# Patient Record
Sex: FEMALE | Race: BLACK OR AFRICAN AMERICAN | NOT HISPANIC OR LATINO | Employment: UNEMPLOYED | URBAN - METROPOLITAN AREA
[De-identification: names, ages, dates, MRNs, and addresses within clinical notes are randomized per-mention and may not be internally consistent; named-entity substitution may affect disease eponyms.]

---

## 2021-12-28 ENCOUNTER — APPOINTMENT (EMERGENCY)
Dept: RADIOLOGY | Facility: HOSPITAL | Age: 61
DRG: 137 | End: 2021-12-28
Payer: COMMERCIAL

## 2021-12-28 ENCOUNTER — HOSPITAL ENCOUNTER (INPATIENT)
Facility: HOSPITAL | Age: 61
LOS: 3 days | Discharge: HOME | DRG: 137 | End: 2021-12-31
Attending: EMERGENCY MEDICINE | Admitting: INTERNAL MEDICINE
Payer: COMMERCIAL

## 2021-12-28 DIAGNOSIS — J18.9 SEPSIS DUE TO PNEUMONIA (HCC): Primary | ICD-10-CM

## 2021-12-28 DIAGNOSIS — A41.9 SEPSIS DUE TO PNEUMONIA (HCC): Primary | ICD-10-CM

## 2021-12-28 DIAGNOSIS — N17.9 AKI (ACUTE KIDNEY INJURY) (HCC): ICD-10-CM

## 2021-12-28 DIAGNOSIS — E87.6 HYPOKALEMIA: ICD-10-CM

## 2021-12-28 PROBLEM — I10 HTN (HYPERTENSION): Status: ACTIVE | Noted: 2021-12-28

## 2021-12-28 PROBLEM — E11.9 DIABETES (HCC): Status: ACTIVE | Noted: 2021-12-28

## 2021-12-28 PROBLEM — R65.10 SIRS (SYSTEMIC INFLAMMATORY RESPONSE SYNDROME) (HCC): Status: ACTIVE | Noted: 2021-12-28

## 2021-12-28 PROBLEM — U07.1 PNEUMONIA DUE TO COVID-19 VIRUS: Status: ACTIVE | Noted: 2021-12-28

## 2021-12-28 PROBLEM — J12.82 PNEUMONIA DUE TO COVID-19 VIRUS: Status: ACTIVE | Noted: 2021-12-28

## 2021-12-28 LAB
2HR DELTA HS TROPONIN: 1.8 NG/L
4HR DELTA HS TROPONIN: -2 NG/L
ALBUMIN SERPL BCP-MCNC: 2.6 G/DL (ref 3.5–5)
ALP SERPL-CCNC: 70 U/L (ref 46–116)
ALT SERPL W P-5'-P-CCNC: 26 U/L (ref 12–78)
ANION GAP SERPL CALCULATED.3IONS-SCNC: 12 MMOL/L (ref 4–13)
AST SERPL W P-5'-P-CCNC: 36 U/L (ref 5–45)
BASOPHILS # BLD MANUAL: 0 THOUSAND/UL (ref 0–0.1)
BASOPHILS NFR MAR MANUAL: 0 % (ref 0–1)
BILIRUB SERPL-MCNC: 0.29 MG/DL (ref 0.2–1)
BUN SERPL-MCNC: 22 MG/DL (ref 5–25)
CALCIUM ALBUM COR SERPL-MCNC: 9.6 MG/DL (ref 8.3–10.1)
CALCIUM SERPL-MCNC: 8.5 MG/DL (ref 8.3–10.1)
CARDIAC TROPONIN I PNL SERPL HS: 22 NG/L
CARDIAC TROPONIN I PNL SERPL HS: 24 NG/L
CARDIAC TROPONIN I PNL SERPL HS: 25.8 NG/L
CHLORIDE SERPL-SCNC: 103 MMOL/L (ref 100–108)
CK MB SERPL-MCNC: <1 % (ref 0–2.5)
CK MB SERPL-MCNC: <1 NG/ML (ref 0–5)
CK SERPL-CCNC: 443 U/L (ref 26–192)
CO2 SERPL-SCNC: 25 MMOL/L (ref 21–32)
CREAT SERPL-MCNC: 1.74 MG/DL (ref 0.6–1.3)
CRP SERPL QL: 82.9 MG/L
D DIMER PPP FEU-MCNC: 1.25 UG/ML FEU
EOSINOPHIL # BLD MANUAL: 0 THOUSAND/UL (ref 0–0.4)
EOSINOPHIL NFR BLD MANUAL: 0 % (ref 0–6)
ERYTHROCYTE [DISTWIDTH] IN BLOOD BY AUTOMATED COUNT: 13.6 % (ref 11.6–15.1)
FLUAV RNA RESP QL NAA+PROBE: NEGATIVE
FLUBV RNA RESP QL NAA+PROBE: NEGATIVE
GFR SERPL CREATININE-BSD FRML MDRD: 30 ML/MIN/1.73SQ M
GLUCOSE SERPL-MCNC: 69 MG/DL (ref 65–140)
GLUCOSE SERPL-MCNC: 82 MG/DL (ref 65–140)
HCT VFR BLD AUTO: 41.3 % (ref 34.8–46.1)
HGB BLD-MCNC: 13.3 G/DL (ref 11.5–15.4)
LACTATE SERPL-SCNC: 1.7 MMOL/L (ref 0.5–2)
LACTATE SERPL-SCNC: 2 MMOL/L (ref 0.5–2)
LACTATE SERPL-SCNC: 2.9 MMOL/L (ref 0.5–2)
LYMPHOCYTES # BLD AUTO: 0.5 THOUSAND/UL (ref 0.6–4.47)
LYMPHOCYTES # BLD AUTO: 8 % (ref 14–44)
MCH RBC QN AUTO: 27.2 PG (ref 26.8–34.3)
MCHC RBC AUTO-ENTMCNC: 32.2 G/DL (ref 31.4–37.4)
MCV RBC AUTO: 85 FL (ref 82–98)
METAMYELOCYTES NFR BLD MANUAL: 1 % (ref 0–1)
MONOCYTES # BLD AUTO: 0.57 THOUSAND/UL (ref 0–1.22)
MONOCYTES NFR BLD: 9 % (ref 4–12)
NEUTROPHILS # BLD MANUAL: 5.17 THOUSAND/UL (ref 1.85–7.62)
NEUTS BAND NFR BLD MANUAL: 9 % (ref 0–8)
NEUTS SEG NFR BLD AUTO: 73 % (ref 43–75)
PLATELET # BLD AUTO: 288 THOUSANDS/UL (ref 149–390)
PLATELET # BLD AUTO: 325 THOUSANDS/UL (ref 149–390)
PLATELET BLD QL SMEAR: ADEQUATE
PMV BLD AUTO: 9.1 FL (ref 8.9–12.7)
PMV BLD AUTO: 9.3 FL (ref 8.9–12.7)
POTASSIUM SERPL-SCNC: 3.3 MMOL/L (ref 3.5–5.3)
PROT SERPL-MCNC: 7.3 G/DL (ref 6.4–8.2)
RBC # BLD AUTO: 4.89 MILLION/UL (ref 3.81–5.12)
RBC MORPH BLD: NORMAL
RSV RNA RESP QL NAA+PROBE: NEGATIVE
SARS-COV-2 RNA RESP QL NAA+PROBE: POSITIVE
SODIUM SERPL-SCNC: 140 MMOL/L (ref 136–145)
WBC # BLD AUTO: 6.3 THOUSAND/UL (ref 4.31–10.16)

## 2021-12-28 PROCEDURE — 85049 AUTOMATED PLATELET COUNT: CPT

## 2021-12-28 PROCEDURE — 87040 BLOOD CULTURE FOR BACTERIA: CPT

## 2021-12-28 PROCEDURE — XW033E5 INTRODUCTION OF REMDESIVIR ANTI-INFECTIVE INTO PERIPHERAL VEIN, PERCUTANEOUS APPROACH, NEW TECHNOLOGY GROUP 5: ICD-10-PCS

## 2021-12-28 PROCEDURE — 82948 REAGENT STRIP/BLOOD GLUCOSE: CPT

## 2021-12-28 PROCEDURE — 71045 X-RAY EXAM CHEST 1 VIEW: CPT

## 2021-12-28 PROCEDURE — 93005 ELECTROCARDIOGRAM TRACING: CPT

## 2021-12-28 PROCEDURE — 82550 ASSAY OF CK (CPK): CPT

## 2021-12-28 PROCEDURE — 80053 COMPREHEN METABOLIC PANEL: CPT | Performed by: EMERGENCY MEDICINE

## 2021-12-28 PROCEDURE — 99285 EMERGENCY DEPT VISIT HI MDM: CPT

## 2021-12-28 PROCEDURE — 83605 ASSAY OF LACTIC ACID: CPT | Performed by: INTERNAL MEDICINE

## 2021-12-28 PROCEDURE — 96375 TX/PRO/DX INJ NEW DRUG ADDON: CPT

## 2021-12-28 PROCEDURE — 82553 CREATINE MB FRACTION: CPT

## 2021-12-28 PROCEDURE — 0241U HB NFCT DS VIR RESP RNA 4 TRGT: CPT

## 2021-12-28 PROCEDURE — 84484 ASSAY OF TROPONIN QUANT: CPT

## 2021-12-28 PROCEDURE — 84145 PROCALCITONIN (PCT): CPT

## 2021-12-28 PROCEDURE — 85007 BL SMEAR W/DIFF WBC COUNT: CPT | Performed by: EMERGENCY MEDICINE

## 2021-12-28 PROCEDURE — 85027 COMPLETE CBC AUTOMATED: CPT | Performed by: EMERGENCY MEDICINE

## 2021-12-28 PROCEDURE — 99285 EMERGENCY DEPT VISIT HI MDM: CPT | Performed by: EMERGENCY MEDICINE

## 2021-12-28 PROCEDURE — 84484 ASSAY OF TROPONIN QUANT: CPT | Performed by: EMERGENCY MEDICINE

## 2021-12-28 PROCEDURE — 96365 THER/PROPH/DIAG IV INF INIT: CPT

## 2021-12-28 PROCEDURE — 85379 FIBRIN DEGRADATION QUANT: CPT

## 2021-12-28 PROCEDURE — 83605 ASSAY OF LACTIC ACID: CPT

## 2021-12-28 PROCEDURE — 99223 1ST HOSP IP/OBS HIGH 75: CPT | Performed by: INTERNAL MEDICINE

## 2021-12-28 PROCEDURE — 86140 C-REACTIVE PROTEIN: CPT

## 2021-12-28 PROCEDURE — 36415 COLL VENOUS BLD VENIPUNCTURE: CPT | Performed by: EMERGENCY MEDICINE

## 2021-12-28 RX ORDER — GLIMEPIRIDE 2 MG/1
4 TABLET ORAL
Status: CANCELLED | OUTPATIENT
Start: 2021-12-29

## 2021-12-28 RX ORDER — ACETAMINOPHEN 325 MG/1
650 TABLET ORAL ONCE
Status: COMPLETED | OUTPATIENT
Start: 2021-12-28 | End: 2021-12-28

## 2021-12-28 RX ORDER — POTASSIUM CHLORIDE 20 MEQ/1
40 TABLET, EXTENDED RELEASE ORAL ONCE
Status: COMPLETED | OUTPATIENT
Start: 2021-12-28 | End: 2021-12-28

## 2021-12-28 RX ORDER — GLIMEPIRIDE 4 MG/1
4 TABLET ORAL
COMMUNITY

## 2021-12-28 RX ORDER — AMLODIPINE BESYLATE 10 MG/1
10 TABLET ORAL DAILY
COMMUNITY

## 2021-12-28 RX ORDER — DOXYCYCLINE HYCLATE 100 MG/1
100 CAPSULE ORAL EVERY 12 HOURS
Status: DISCONTINUED | OUTPATIENT
Start: 2021-12-29 | End: 2021-12-29

## 2021-12-28 RX ORDER — HEPARIN SODIUM 5000 [USP'U]/ML
5000 INJECTION, SOLUTION INTRAVENOUS; SUBCUTANEOUS EVERY 8 HOURS SCHEDULED
Status: DISCONTINUED | OUTPATIENT
Start: 2021-12-28 | End: 2021-12-31 | Stop reason: HOSPADM

## 2021-12-28 RX ORDER — DEXAMETHASONE SODIUM PHOSPHATE 4 MG/ML
6 INJECTION, SOLUTION INTRA-ARTICULAR; INTRALESIONAL; INTRAMUSCULAR; INTRAVENOUS; SOFT TISSUE EVERY 24 HOURS
Status: DISCONTINUED | OUTPATIENT
Start: 2021-12-28 | End: 2021-12-31

## 2021-12-28 RX ORDER — SODIUM CHLORIDE 9 MG/ML
75 INJECTION, SOLUTION INTRAVENOUS CONTINUOUS
Status: DISCONTINUED | OUTPATIENT
Start: 2021-12-28 | End: 2021-12-29

## 2021-12-28 RX ORDER — AMLODIPINE BESYLATE 10 MG/1
10 TABLET ORAL DAILY
Status: DISCONTINUED | OUTPATIENT
Start: 2021-12-29 | End: 2021-12-31 | Stop reason: HOSPADM

## 2021-12-28 RX ORDER — ACETAMINOPHEN 325 MG/1
650 TABLET ORAL EVERY 6 HOURS PRN
Status: DISCONTINUED | OUTPATIENT
Start: 2021-12-28 | End: 2021-12-31 | Stop reason: HOSPADM

## 2021-12-28 RX ORDER — POTASSIUM CHLORIDE 20 MEQ/1
20 TABLET, EXTENDED RELEASE ORAL ONCE
Status: COMPLETED | OUTPATIENT
Start: 2021-12-28 | End: 2021-12-28

## 2021-12-28 RX ADMIN — SODIUM CHLORIDE 1000 ML: 0.9 INJECTION, SOLUTION INTRAVENOUS at 17:04

## 2021-12-28 RX ADMIN — POTASSIUM CHLORIDE 20 MEQ: 1500 TABLET, EXTENDED RELEASE ORAL at 17:03

## 2021-12-28 RX ADMIN — HEPARIN SODIUM 5000 UNITS: 5000 INJECTION INTRAVENOUS; SUBCUTANEOUS at 23:33

## 2021-12-28 RX ADMIN — AZITHROMYCIN MONOHYDRATE 500 MG: 500 INJECTION, POWDER, LYOPHILIZED, FOR SOLUTION INTRAVENOUS at 17:02

## 2021-12-28 RX ADMIN — SODIUM CHLORIDE 75 ML/HR: 0.9 INJECTION, SOLUTION INTRAVENOUS at 22:25

## 2021-12-28 RX ADMIN — ACETAMINOPHEN 650 MG: 325 TABLET, FILM COATED ORAL at 15:19

## 2021-12-28 RX ADMIN — REMDESIVIR 200 MG: 100 INJECTION, POWDER, LYOPHILIZED, FOR SOLUTION INTRAVENOUS at 20:21

## 2021-12-28 RX ADMIN — POTASSIUM CHLORIDE 40 MEQ: 1500 TABLET, EXTENDED RELEASE ORAL at 20:16

## 2021-12-28 RX ADMIN — CEFTRIAXONE SODIUM 2000 MG: 10 INJECTION, POWDER, FOR SOLUTION INTRAVENOUS at 16:01

## 2021-12-28 RX ADMIN — DEXAMETHASONE SODIUM PHOSPHATE 6 MG: 4 INJECTION INTRA-ARTICULAR; INTRALESIONAL; INTRAMUSCULAR; INTRAVENOUS; SOFT TISSUE at 20:19

## 2021-12-28 NOTE — QUICK NOTE
[facility]ist Service Attending Physician Attestation Note - Admission    I have seen and examined Adan Puentes personally and have reviewed the medical record independently  I have reviewed the case with the resident physician including all assessments and the plan of care for each  I agree with the resident physician and offer the following addendum to the below statements by the resident physician:     Date Evaluated:  December 20, 2021  Time Evaluated:  6:30 p m  Subjective / HPI:  This is a 58-year-old female with a 4 day history of generalized weakness fevers chills and coughing  She is tachycardic and noted to have a temperature in the ED fulfilling SIRS criteria  Chest x-ray was consistent with multifocal pneumonia and her COVID has not come back positive  Thankfully she is not hypoxic but feels generally unwell  We will trend procalcitonin, check D-dimer, monitor oxygen saturations overnight  For now we will give antibiotics    Exam:   Heart sounds regular  Chest clear  Abdomen soft nondistended  Extremities no edema  No hepatojugular reflux  Appears dry     Assessment and Plan:    COVID-19 pneumonia with MARQUITA on SIRS criteria  IV fluids  Hold nephrotoxins  hold oral diabetic medication  Check bladder scan  Rocephin  Azithromycin  Monitor oxygen saturation  If hypoxic with an start remdesivir Decadron  Check procalcitonin tomorrow      Education and Discussions with Family / Patient:  Discussed with patient resident updated family -also updated daughter at bedside    Time Spent for Care: 45 minutes  More than 50% of total time spent on counseling and coordination of care as described above  Current Patient Status: Inpatient   Anticipated Length of Stay:  Patient will be admitted on an Inpatient basis with an anticipated length of stay of  more than 2 midnights     Justification for Hospital Stay:  COVID-19 pneumonia and sepsis    Epic / Care Everywhere Records Reviewed Independently: Yes     For detailed history, assessment, and plan of care, please review the statements below by Dr Domitila Reaves MD

## 2021-12-28 NOTE — ASSESSMENT & PLAN NOTE
57-year-old female, PMH DM T2, H TN, history of asthma presents to the ED with 4 days of generalized weakness and productive cough  Reports feeling weak with chills   5 fever, tachycardia, CXR with patchy infiltrates  LA 2 9  Tested positive for COVID-19  Initially on room air, however, patient started desaturating 88%, was started on 2L O2 on NC  Lab Results   Component Value Date    WBC 6 30 12/28/2021    DDIMER 1 25 (H) 12/28/2021    HSTNID2 1 8 12/28/2021    HSTNID4 -2 12/28/2021       Mild covid pathway was initiated:  Decadron 6 mg 10 days  Remdesivir 200 mg day1 then 100 mg q24 IV  Doxycyline 100 mg + Ceftriaxone 1g q24 , cannot rule out bacterial pneumonia, D/C if pro-christiana is negative  Labs : pro-christiana with am reflex, CRP 1-3 days, CBC , BMP am, Trend troponin, CK  Might need to add Baracitinib and Atorvastatin if switching to Moderate pathway per protocol

## 2021-12-28 NOTE — ASSESSMENT & PLAN NOTE
POA tachycardia, fever 102 5, no leukocytosis,   XR chest 1 view portable :  12/28/2021  Impression: Suspected bilateral pulmonary infiltrates, left greater than right, suspicious for multifocal pneumonia (possibly Covid-19)  Vitals  Blood Pressure: 151/73  Temperature: (!) 102 5 °F (39 2 °C)  Temp Source: Oral  Pulse: (!) 121  Respirations: 22  SpO2: 92 %    Likely in the setting of covid-19 pneumonia, treatment plan as above  Will start anti-biotics, D/C abx if pro-calcitonin is negative

## 2021-12-28 NOTE — ED PROVIDER NOTES
History  Chief Complaint   Patient presents with    Weakness - Generalized     Pt states that she has been feeling weak since having a "cold" a week ago  Patient is a 59-year-old female with a past medical history of type 2 diabetes, hypertension and asthma who presents to the emergency department with 4 days of generalized weakness and cough  She reports that her symptoms began 4 days ago with a cough that has been productive of yellow phlegm and she has felt very tired for the past week  She also reports that she has had chills but was unaware that she had fever until her temperature was assessed in triage and found to be 102 5 degrees Fahrenheit  She denies headache, lightheadedness, chest pain, shortness of breath, nausea, vomiting, abdominal pain, dysuria or rash  None       Past Medical History:   Diagnosis Date    Asthma     Diabetes mellitus ([facility] [address] )     Hypercholesteremia     Hypertension        No past surgical history on file  No family history on file  I have reviewed and agree with the history as documented  E-Cigarette/Vaping    E-Cigarette Use Never User      E-Cigarette/Vaping Substances     Social History     Tobacco Use    Smoking status: Never Smoker    Smokeless tobacco: Never Used   Vaping Use    Vaping Use: Never used   Substance Use Topics    Alcohol use: Never    Drug use: Never        Review of Systems   Constitutional: Positive for fatigue and fever  Negative for chills  HENT: Negative for congestion, ear pain, rhinorrhea and sore throat  Eyes: Negative for pain and visual disturbance  Respiratory: Positive for cough  Negative for shortness of breath  Cardiovascular: Negative for chest pain, palpitations and leg swelling  Gastrointestinal: Negative for abdominal distention, abdominal pain, diarrhea, nausea and vomiting  Endocrine: Negative  Genitourinary: Negative for dysuria, frequency, hematuria and urgency     Musculoskeletal: Negative for arthralgias, back pain, neck pain and neck stiffness  Skin: Negative for color change and rash  Allergic/Immunologic: Negative  Neurological: Negative for seizures and syncope  All other systems reviewed and are negative  Physical Exam  ED Triage Vitals [12/28/21 1509]   Temperature Pulse Respirations Blood Pressure SpO2   (!) 102 5 °F (39 2 °C) (!) 121 22 151/73 92 %      Temp Source Heart Rate Source Patient Position - Orthostatic VS BP Location FiO2 (%)   Oral Monitor Sitting Left arm --      Pain Score       --             Orthostatic Vital Signs  Vitals:    12/28/21 1509 12/28/21 1714   BP: 151/73    Pulse: (!) 121 104   Patient Position - Orthostatic VS: Sitting        Physical Exam  Vitals and nursing note reviewed  Constitutional:       General: She is not in acute distress  Appearance: Normal appearance  She is well-developed  She is ill-appearing  Comments: Patient is lying in her bed and appears very fatigued  SpO2 is 92 percent on room air  HENT:      Head: Normocephalic and atraumatic  Nose: Nose normal  No congestion or rhinorrhea  Mouth/Throat:      Mouth: Mucous membranes are moist       Pharynx: Oropharynx is clear  No oropharyngeal exudate or posterior oropharyngeal erythema  Eyes:      Extraocular Movements: Extraocular movements intact  Conjunctiva/sclera: Conjunctivae normal       Pupils: Pupils are equal, round, and reactive to light  Cardiovascular:      Rate and Rhythm: Regular rhythm  Tachycardia present  Pulses: Normal pulses  Heart sounds: Normal heart sounds  No murmur heard  No friction rub  Pulmonary:      Effort: Pulmonary effort is normal  No respiratory distress  Breath sounds: Rales present  Comments: Rales noted on auscultation of the right middle lobe  Abdominal:      General: Abdomen is flat  Bowel sounds are normal  There is no distension  Palpations: Abdomen is soft  There is no mass        Tenderness: There is no abdominal tenderness  There is no guarding or rebound  Musculoskeletal:         General: No swelling or tenderness  Normal range of motion  Cervical back: Normal range of motion and neck supple  No rigidity or tenderness  Right lower leg: No edema  Left lower leg: No edema  Skin:     General: Skin is warm and dry  Capillary Refill: Capillary refill takes less than 2 seconds  Coloration: Skin is not jaundiced  Findings: No erythema or rash  Neurological:      General: No focal deficit present  Mental Status: She is alert and oriented to person, place, and time  Mental status is at baseline  Cranial Nerves: No cranial nerve deficit  Sensory: No sensory deficit  Motor: No weakness  ED Medications  Medications   azithromycin (ZITHROMAX) 500 mg in sodium chloride 0 9% 250mL IVPB 500 mg (500 mg Intravenous New Bag 12/28/21 1702)   sodium chloride 0 9 % bolus 1,000 mL (1,000 mL Intravenous New Bag 12/28/21 1704)   acetaminophen (TYLENOL) tablet 650 mg (650 mg Oral Given 12/28/21 1519)   cefTRIAXone (ROCEPHIN) 2,000 mg in dextrose 5 % 50 mL IVPB (0 mg Intravenous Stopped 12/28/21 1659)   potassium chloride (K-DUR,KLOR-CON) CR tablet 20 mEq (20 mEq Oral Given 12/28/21 1703)       Diagnostic Studies  Results Reviewed     Procedure Component Value Units Date/Time    CBC and differential [[id number]]  (Normal) Collected: 12/28/21 1546    Lab Status: Final result Specimen: Blood from Arm, Right Updated: 12/28/21 1657     WBC 6 30 Thousand/uL      RBC 4 89 Million/uL      Hemoglobin 13 3 g/dL      Hematocrit 41 3 %      MCV 85 fL      MCH 27 2 pg      MCHC 32 2 g/dL      RDW 13 6 %      MPV 9 3 fL      Platelets 000 Thousands/uL     Narrative: This is an appended report  These results have been appended to a previously verified report      Manual Differential(PHLEBS Do Not Order) [[id number]]  (Abnormal) Collected: 12/28/21 1546    Lab Status: Final result Specimen: Blood from Arm, Right Updated: 12/28/21 1657     Segmented % 73 %      Bands % 9 %      Lymphocytes % 8 %      Monocytes % 9 %      Eosinophils, % 0 %      Basophils % 0 %      Metamyelocytes% 1 %      Absolute Neutrophils 5 17 Thousand/uL      Lymphocytes Absolute 0 50 Thousand/uL      Monocytes Absolute 0 57 Thousand/uL      Eosinophils Absolute 0 00 Thousand/uL      Basophils Absolute 0 00 Thousand/uL      Total Counted --     RBC Morphology Normal     Platelet Estimate Adequate    COVID/FLU/RSV - 2 hour TAT [[id number]] Collected: 12/28/21 1633    Lab Status: In process Specimen: Nares from Nose Updated: 12/28/21 1650    Comprehensive metabolic panel [[id number]]  (Abnormal) Collected: 12/28/21 1546    Lab Status: Final result Specimen: Blood from Arm, Right Updated: 12/28/21 1623     Sodium 140 mmol/L      Potassium 3 3 mmol/L      Chloride 103 mmol/L      CO2 25 mmol/L      ANION GAP 12 mmol/L      BUN 22 mg/dL      Creatinine 1 74 mg/dL      Glucose 82 mg/dL      Calcium 8 5 mg/dL      Corrected Calcium 9 6 mg/dL      AST 36 U/L      ALT 26 U/L      Alkaline Phosphatase 70 U/L      Total Protein 7 3 g/dL      Albumin 2 6 g/dL      Total Bilirubin 0 29 mg/dL      eGFR 30 ml/min/1 73sq m     Narrative:      [place] guidelines for Chronic Kidney Disease (CKD):     Stage 1 with normal or high GFR (GFR > 90 mL/min/1 73 square meters)    Stage 2 Mild CKD (GFR = 60-89 mL/min/1 73 square meters)    Stage 3A Moderate CKD (GFR = 45-59 mL/min/1 73 square meters)    Stage 3B Moderate CKD (GFR = 30-44 mL/min/1 73 square meters)    Stage 4 Severe CKD (GFR = 15-29 mL/min/1 73 square meters)    Stage 5 End Stage CKD (GFR <15 mL/min/1 73 square meters)  Note: GFR calculation is accurate only with a steady state creatinine    Blood culture #1 [[id number]] Collected: 12/28/21 1553    Lab Status:  In process Specimen: Blood from Arm, Left Updated: 12/28/21 1601    Blood culture #2 [[id number]] Collected: 12/28/21 1553    Lab Status: In process Specimen: Blood from Arm, Right Updated: 12/28/21 1600    Lactic acid [[id number]] Collected: 12/28/21 1553    Lab Status: In process Specimen: Blood from Arm, Left Updated: 12/28/21 1600    HS Troponin 0hr (reflex protocol) [[id number]] Collected: 12/28/21 1546    Lab Status: In process Specimen: Blood from Arm, Right Updated: 12/28/21 1554    Fingerstick Glucose (POCT) [[id number]]  (Normal) Collected: 12/28/21 1512    Lab Status: Final result Updated: 12/28/21 1514     POC Glucose 69 mg/dl                  XR chest 1 view portable   ED Interpretation by Nnamdi Eckert DO (12/28 1619)   Consolidation in the right lung field with possible consolidation versus cardiomegaly and a left lower lung field  Final Result by Arline King DO (12/28 1646)      Suspected bilateral pulmonary infiltrates, left greater than right, suspicious for multifocal pneumonia (possibly Covid-19)  Covid testing pending  Workstation performed: [id number]               Procedures  ECG 12 Lead Documentation Only    Date/Time: 12/28/2021 4:32 PM  Performed by: Nnamdi Eckert DO  Authorized by: Nnamdi Eckert DO     Indications / Diagnosis:  Weakness  ECG reviewed by me, the ED Provider: yes    Patient location:  ED  Previous ECG:     Previous ECG:  Unavailable    Comparison to cardiac monitor: No    Interpretation:     Interpretation: abnormal    Rate:     ECG rate:  109    ECG rate assessment: tachycardic    Rhythm:     Rhythm: sinus tachycardia    Ectopy:     Ectopy: none    QRS:     QRS axis:  Normal    QRS intervals:  Normal  Conduction:     Conduction: normal    ST segments:     ST segments:  Normal  T waves:     T waves: non-specific    Comments:      No acute ischemia noted            ED Course  ED Course as of 12/28/21 1714   Tue Dec 28, 2021   1642 Patient has MARQUITA and hypokalemic so I have ordered PO potassium and IV fluids at this time  1704 I will reach out to inpatient [facility] for admission for septic pneumonia  1710 Patient repeat temperature was 99 4 post Tylenol  26 I spoke with Dr Fito Gaston who agreed to admit the patient to inpatient [facility] for septic pneumonia, MARQUITA and Hypokalemia  MDM  Number of Diagnoses or Management Options  MARQUITA (acute kidney injury) ([facility] )  Hypokalemia  Sepsis due to pneumonia [facility])  Diagnosis management comments: And patient is a 27-year-old female with a past medical history of type 2 diabetes, hypertension and asthma who presents to the emergency department with 4 days of generalized weakness and cough  Her chest x-ray was conducted while I was in the room and it appears that she has consolidation the right middle lobe and left lower lobe  The combination of a fever 102 5 and a cough producing yellow phlegm causes concern for bacterial pneumonia  She meets SIRS criteria so I have ordered lactic, blood cultures and started broad-spectrum antibiotics with Rocephin and azithromycin  I have also ordered a CBC, CMP, EKG and tylenol to manage her fever  Results pending at this time  Disposition  Final diagnoses:   Sepsis due to pneumonia ([facility] )   MARQUITA (acute kidney injury) ([place] [address] )   Hypokalemia     Time reflects when diagnosis was documented in both MDM as applicable and the Disposition within this note     Time User Action Codes Description Comment    12/28/2021  5:09 PM Chloé Herring Add [J18 9,  A41 9] Sepsis due to pneumonia ([facility] )     12/28/2021  5:09 PM Roxane Troncoso Add [N17 9] MARQUITA (acute kidney injury) ([place] [address] )     12/28/2021  5:10 PM Chloé Herring Add [E87 6] Hypokalemia       ED Disposition     ED Disposition Condition Date/Time Comment    Admit Stable Tue Dec 28, 2021  5:12 PM Case was discussed with Dr Fito Gaston and the patient's admission status was agreed to be inpatient to the service of Dr Fito Gaston           Follow-up Information    None Patient's Medications    No medications on file     No discharge procedures on file  PDMP Review     None           ED Provider  Attending physically available and evaluated Dharmesh Monet I managed the patient along with the ED Attending      Electronically Signed by         Liya Arias DO  12/28/21 6931

## 2021-12-28 NOTE — ASSESSMENT & PLAN NOTE
Lab Results   Component Value Date    BUN 22 12/28/2021    CREATININE 1 74 (H) 12/28/2021       · Avoid nephrotoxic agent,   · Mild fluid hydration 75 cc an hour 12 hours  · A BMP, follow-up on results

## 2021-12-28 NOTE — ASSESSMENT & PLAN NOTE
No results found for: HGBA1C    Recent Labs     12/28/21  1512   POCGLU 69       Blood Sugar Average: Last 72 hrs:  (P) 69     On metformin and glimepiride  Starting on sliding scale, algorithm 2, no weight on file, adjust as appropriate  Her doctor in [place] is KAREN Kelsey

## 2021-12-28 NOTE — ED ATTENDING ATTESTATION
12/28/2021  IMarylu MD, saw and evaluated the patient  I have discussed the patient with the resident/non-physician practitioner and agree with the resident's/non-physician practitioner's findings, Plan of Care, and MDM as documented in the resident's/non-physician practitioner's note, except where noted  All available labs and Radiology studies were reviewed  I was present for key portions of any procedure(s) performed by the resident/non-physician practitioner and I was immediately available to provide assistance  At this point I agree with the current assessment done in the Emergency Department  I have conducted an independent evaluation of this patient a history and physical is as follows:    41-year-old presenting to ER with weakness fatigue  Fever  Tachycardic  Cough  Short of breath  Denies chest pain  No edema  Decreased breath sounds  Regular rate and rhythm  Abdomen soft nontender  Warm to touch  Agree with septic workup  COVID swab  Patient oxygen dropping to 91 percent  High risk patient    Will need admission to hospital       ED Course         Critical Care Time  Procedures

## 2021-12-29 ENCOUNTER — APPOINTMENT (INPATIENT)
Dept: NON INVASIVE DIAGNOSTICS | Facility: HOSPITAL | Age: 61
DRG: 137 | End: 2021-12-29
Payer: COMMERCIAL

## 2021-12-29 LAB
ALBUMIN SERPL BCP-MCNC: 2.4 G/DL (ref 3.5–5)
ALP SERPL-CCNC: 68 U/L (ref 46–116)
ALT SERPL W P-5'-P-CCNC: 26 U/L (ref 12–78)
ANION GAP SERPL CALCULATED.3IONS-SCNC: 11 MMOL/L (ref 4–13)
APICAL FOUR CHAMBER EJECTION FRACTION: 60 %
ASCENDING AORTA: 3.3 CM
AST SERPL W P-5'-P-CCNC: 33 U/L (ref 5–45)
BILIRUB SERPL-MCNC: 0.23 MG/DL (ref 0.2–1)
BUN SERPL-MCNC: 20 MG/DL (ref 5–25)
CALCIUM ALBUM COR SERPL-MCNC: 9.7 MG/DL (ref 8.3–10.1)
CALCIUM SERPL-MCNC: 8.4 MG/DL (ref 8.3–10.1)
CHLORIDE SERPL-SCNC: 106 MMOL/L (ref 100–108)
CO2 SERPL-SCNC: 23 MMOL/L (ref 21–32)
CREAT SERPL-MCNC: 1.4 MG/DL (ref 0.6–1.3)
CRP SERPL QL: 64.8 MG/L
E WAVE DECELERATION TIME: 249 MS
GFR SERPL CREATININE-BSD FRML MDRD: 40 ML/MIN/1.73SQ M
GLUCOSE SERPL-MCNC: 170 MG/DL (ref 65–140)
GLUCOSE SERPL-MCNC: 229 MG/DL (ref 65–140)
GLUCOSE SERPL-MCNC: 272 MG/DL (ref 65–140)
GLUCOSE SERPL-MCNC: 354 MG/DL (ref 65–140)
LACTATE SERPL-SCNC: 1.2 MMOL/L (ref 0.5–2)
LEFT ATRIUM AREA SYSTOLE SINGLE PLANE A4C: 24.7 CM2
MV PEAK A VEL: 1.3 M/S
MV PEAK E VEL: 114 CM/S
MV STENOSIS PRESSURE HALF TIME: 0 MS
POTASSIUM SERPL-SCNC: 3.8 MMOL/L (ref 3.5–5.3)
PROCALCITONIN SERPL-MCNC: 0.09 NG/ML
PROCALCITONIN SERPL-MCNC: 0.11 NG/ML
PROT SERPL-MCNC: 7.1 G/DL (ref 6.4–8.2)
RIGHT ATRIUM AREA SYSTOLE A4C: 15 CM2
RIGHT VENTRICLE ID DIMENSION: 3.8 CM
RV PSP: 25 MMHG
SL CV LV EF: 60
SODIUM SERPL-SCNC: 140 MMOL/L (ref 136–145)
TRICUSPID VALVE PEAK REGURGITATION VELOCITY: 2.47 M/S
TRICUSPID VALVE S': 1.1 CM/S
TV PEAK GRADIENT: 24 MMHG

## 2021-12-29 PROCEDURE — 93308 TTE F-UP OR LMTD: CPT | Performed by: INTERNAL MEDICINE

## 2021-12-29 PROCEDURE — 82948 REAGENT STRIP/BLOOD GLUCOSE: CPT

## 2021-12-29 PROCEDURE — 84145 PROCALCITONIN (PCT): CPT

## 2021-12-29 PROCEDURE — 93321 DOPPLER ECHO F-UP/LMTD STD: CPT

## 2021-12-29 PROCEDURE — 99232 SBSQ HOSP IP/OBS MODERATE 35: CPT | Performed by: INTERNAL MEDICINE

## 2021-12-29 PROCEDURE — 80053 COMPREHEN METABOLIC PANEL: CPT

## 2021-12-29 PROCEDURE — 93321 DOPPLER ECHO F-UP/LMTD STD: CPT | Performed by: INTERNAL MEDICINE

## 2021-12-29 PROCEDURE — 36415 COLL VENOUS BLD VENIPUNCTURE: CPT | Performed by: INTERNAL MEDICINE

## 2021-12-29 PROCEDURE — 93325 DOPPLER ECHO COLOR FLOW MAPG: CPT

## 2021-12-29 PROCEDURE — 93325 DOPPLER ECHO COLOR FLOW MAPG: CPT | Performed by: INTERNAL MEDICINE

## 2021-12-29 PROCEDURE — 93308 TTE F-UP OR LMTD: CPT

## 2021-12-29 PROCEDURE — 83605 ASSAY OF LACTIC ACID: CPT | Performed by: INTERNAL MEDICINE

## 2021-12-29 PROCEDURE — XW0DXM6 INTRODUCTION OF BARICITINIB INTO MOUTH AND PHARYNX, EXTERNAL APPROACH, NEW TECHNOLOGY GROUP 6: ICD-10-PCS | Performed by: INTERNAL MEDICINE

## 2021-12-29 PROCEDURE — 86140 C-REACTIVE PROTEIN: CPT

## 2021-12-29 RX ADMIN — HEPARIN SODIUM 5000 UNITS: 5000 INJECTION INTRAVENOUS; SUBCUTANEOUS at 05:13

## 2021-12-29 RX ADMIN — INSULIN LISPRO 3 UNITS: 100 INJECTION, SOLUTION INTRAVENOUS; SUBCUTANEOUS at 11:22

## 2021-12-29 RX ADMIN — HEPARIN SODIUM 5000 UNITS: 5000 INJECTION INTRAVENOUS; SUBCUTANEOUS at 20:43

## 2021-12-29 RX ADMIN — INSULIN LISPRO 2 UNITS: 100 INJECTION, SOLUTION INTRAVENOUS; SUBCUTANEOUS at 08:12

## 2021-12-29 RX ADMIN — BARICITINIB 2 MG: 2 TABLET, FILM COATED ORAL at 15:48

## 2021-12-29 RX ADMIN — AMLODIPINE BESYLATE 10 MG: 10 TABLET ORAL at 08:02

## 2021-12-29 RX ADMIN — INSULIN LISPRO 4 UNITS: 100 INJECTION, SOLUTION INTRAVENOUS; SUBCUTANEOUS at 17:24

## 2021-12-29 RX ADMIN — DOXYCYCLINE 100 MG: 100 CAPSULE ORAL at 07:59

## 2021-12-29 RX ADMIN — HEPARIN SODIUM 5000 UNITS: 5000 INJECTION INTRAVENOUS; SUBCUTANEOUS at 14:34

## 2021-12-29 RX ADMIN — DEXAMETHASONE SODIUM PHOSPHATE 6 MG: 4 INJECTION INTRA-ARTICULAR; INTRALESIONAL; INTRAMUSCULAR; INTRAVENOUS; SOFT TISSUE at 19:08

## 2021-12-29 RX ADMIN — REMDESIVIR 100 MG: 100 INJECTION, POWDER, LYOPHILIZED, FOR SOLUTION INTRAVENOUS at 20:43

## 2021-12-29 NOTE — ASSESSMENT & PLAN NOTE
Lab Results   Component Value Date    K 3 3 (L) 12/28/2021       · Replete potassium >4 0  · Follow-up on am BMP  · Received 20 mEq in ED,   · Ordered 40mEQ PO

## 2021-12-29 NOTE — ASSESSMENT & PLAN NOTE
68-year-old female, PMH DM T2, H TN, history of asthma presents to the ED with 4 days of generalized weakness and productive cough  Reports feeling weak with chills   5 fever, tachycardia, CXR with patchy infiltrates  LA 2 9  Tested positive for COVID-19  Initially on room air, however, patient started desaturating 88%, was started on 2L O2 on NC--> 4L NC  Lab Results   Component Value Date    WBC 6 30 12/28/2021    DDIMER 1 25 (H) 12/28/2021    CKTOTAL 443 (H) 12/28/2021    HSTNID2 1 8 12/28/2021    HSTNID4 -2 12/28/2021       Mild covid pathway was initiated:  Decadron 6 mg 10 days  Remdesivir 200 mg day1 then 100 mg q24 IV  Doxycyline 100 mg + Ceftriaxone 1g q24 , cannot rule out bacterial pneumonia, D/C if pro-christiana is negative  Labs : pro-christiana with am reflex- reflex pending, initial is 0 11, CRP 1-3 days, CBC , BMP am, Trend troponin, CK  Might need to add Baracitinib and Atorvastatin if switching to Moderate pathway per protocol  Will also obtain c  Dif due to patient endorsing loose-watery stools, which are likely due to COVID, but will check c  Dif just in case

## 2021-12-29 NOTE — PROGRESS NOTES
[facility]  Progress Note - Ben Carlson 1960, 57 y o  female MRN: [id number]  Unit/Bed#: S -01 Encounter: [id number]  Primary Care Provider: Luciano Shaffer   Date and time admitted to hospital: 12/28/2021  3:16 PM    * Pneumonia due to COVID-19 virus  Assessment & Plan  43-year-old female, PMH DM T2, H TN, history of asthma presents to the ED with 4 days of generalized weakness and productive cough  Reports feeling weak with chills   5 fever, tachycardia, CXR with patchy infiltrates  LA 2 9  Tested positive for COVID-19  Initially on room air, however, patient started desaturating 88%, was started on 2L O2 on NC--> 4L NC  Lab Results   Component Value Date    WBC 6 30 12/28/2021    DDIMER 1 25 (H) 12/28/2021    CKTOTAL 443 (H) 12/28/2021    HSTNID2 1 8 12/28/2021    HSTNID4 -2 12/28/2021       Mild covid pathway was initiated:  Decadron 6 mg 10 days  Remdesivir 200 mg day1 then 100 mg q24 IV  Doxycyline 100 mg + Ceftriaxone 1g q24 , cannot rule out bacterial pneumonia, D/C if pro-christiana is negative  Labs : pro-christiana with am reflex- reflex pending, initial is 0 11, CRP 1-3 days, CBC , BMP am, Trend troponin, CK  Might need to add Baracitinib and Atorvastatin if switching to Moderate pathway per protocol  Will also obtain c  Dif due to patient endorsing loose-watery stools, which are likely due to COVID, but will check c  Dif just in case  Hypokalemia  Assessment & Plan  Lab Results   Component Value Date    K 3 8 12/29/2021     · Replete potassium >4 0  · Follow-up on am BMP    Diabetes [facility])  Assessment & Plan  No results found for: HGBA1C    Recent Labs     12/28/21  1512 12/29/21  0805 12/29/21  1104   POCGLU 69 229* 272*       Blood Sugar Average: Last 72 hrs:  (P) 190     On metformin and glimepiride  Starting on sliding scale, algorithm 2, no weight on file, adjust as appropriate  Her doctor in [place] is [employer]       HTN (hypertension)  Assessment & Plan    BP Readings from Last 3 Encounters:   12/29/21 154/82     Continue Amlodipine 10 mg qd    MARQUITA (acute kidney injury) [facility])  Assessment & Plan  Lab Results   Component Value Date    BUN 20 12/29/2021    BUN 22 12/28/2021    CREATININE 1 40 (H) 12/29/2021    CREATININE 1 74 (H) 12/28/2021     · Avoid nephrotoxic agent,   · Mild fluid hydration 75 cc an hour 12 hours  · AM BMP, follow-up on results  · Baseline creatinine is unknown due to no data    SIRS (systemic inflammatory response syndrome) (HCC)  Assessment & Plan  POA tachycardia, fever 102 5, no leukocytosis  XR chest 1 view portable :  12/28/2021  Impression: Suspected bilateral pulmonary infiltrates, left greater than right, suspicious for multifocal pneumonia (possibly Covid-19)  Vitals  Blood Pressure: 151/73  Temperature: (!) 102 5 °F (39 2 °C)  Temp Source: Oral  Pulse: (!) 121  Respirations: 22  SpO2: 92 %  Height: 5' 2" (157 5 cm)  Weight - Scale: 89 kg (196 lb 3 4 oz)    Likely in the setting of covid-19 pneumonia, treatment plan as above  Will start antibiotics, D/C abx if pro-calcitonin is negative-- negative x1, will await second level      VTE Pharmacologic Prophylaxis: VTE Score: 5 High Risk (Score >/= 5) - Pharmacological DVT Prophylaxis Ordered: heparin  Sequential Compression Devices Ordered  Patient Centered Rounds: I evaluated the patient without nursing staff present due to [address] [facility] with Specialists or Other Care Team Provider: JIMENA, attending    Education and Discussions with Family / Patient: Attempted to update  (daughter) via phone  Unable to contact  Number on file does not work  Time Spent for Care: 30 minutes  More than 50% of total time spent on counseling and coordination of care as described above      Current Length of Stay: 1 day(s)  Current Patient Status: Inpatient   Certification Statement: The patient will continue to require additional inpatient hospital stay due to [place] pneumonia  Discharge Plan: Anticipate discharge in 48-72 hrs to home  Code Status: Level 1 - Full Code    Subjective:   Patient feels somewhat better than yesterday this morning on my assessment, she was on 3L via NC  She states she has been having loose stools  She still has decreased appetite  Objective:   Vitals:   Temp (24hrs), Av 1 °F (37 8 °C), Min:99 °F (37 2 °C), Max:102 5 °F (39 2 °C)    Temp:  [99 °F (37 2 °C)-102 5 °F (39 2 °C)] 99 °F (37 2 °C)  HR:  [] 105  Resp:  [18-22] 18  BP: (133-180)/(65-84) 154/82  SpO2:  [89 %-97 %] 91 %  Body mass index is 34 47 kg/m²  Input and Output Summary (last 24 hours): Intake/Output Summary (Last 24 hours) at 2021 1317  Last data filed at 2021  Gross per 24 hour   Intake 290 ml   Output --   Net 290 ml       Physical Exam:   Physical Exam  Vitals and nursing note reviewed  Constitutional:       General: She is not in acute distress  Appearance: She is well-developed  Interventions: Nasal cannula in place  HENT:      Head: Normocephalic and atraumatic  Mouth/Throat:      Mouth: Mucous membranes are moist    Eyes:      Extraocular Movements: Extraocular movements intact  Conjunctiva/sclera: Conjunctivae normal       Pupils: Pupils are equal, round, and reactive to light  Cardiovascular:      Rate and Rhythm: Normal rate and regular rhythm  Pulses: Normal pulses  Heart sounds: Normal heart sounds  No murmur heard  Pulmonary:      Effort: Pulmonary effort is normal  No respiratory distress  Breath sounds: Normal breath sounds  Abdominal:      General: Abdomen is flat  Bowel sounds are normal  There is no distension  Palpations: Abdomen is soft  Tenderness: There is no abdominal tenderness  Musculoskeletal:         General: No swelling or tenderness  Cervical back: Neck supple  Skin:     General: Skin is warm and dry     Neurological: Mental Status: She is alert and oriented to person, place, and time  Psychiatric:         Mood and Affect: Mood normal           Additional Data:     Labs:  Results from last 7 days   Lab Units 12/28/21  2222 12/28/21  1546 12/28/21  1546   WBC Thousand/uL  --   --  6 30   HEMOGLOBIN g/dL  --   --  13 3   HEMATOCRIT %  --   --  41 3   PLATELETS Thousands/uL 288   < > 325   BANDS PCT %  --   --  9*   LYMPHO PCT %  --   --  8*   MONO PCT %  --   --  9   EOS PCT %  --   --  0    < > = values in this interval not displayed  Results from last 7 days   Lab Units 12/29/21  0543   SODIUM mmol/L 140   POTASSIUM mmol/L 3 8   CHLORIDE mmol/L 106   CO2 mmol/L 23   BUN mg/dL 20   CREATININE mg/dL 1 40*   ANION GAP mmol/L 11   CALCIUM mg/dL 8 4   ALBUMIN g/dL 2 4*   TOTAL BILIRUBIN mg/dL 0 23   ALK PHOS U/L 68   ALT U/L 26   AST U/L 33   GLUCOSE RANDOM mg/dL 170*         Results from last 7 days   Lab Units 12/29/21  1104 12/29/21  0805 12/28/21  1512   POC GLUCOSE mg/dl 272* 229* 69         Results from last 7 days   Lab Units 12/29/21  0030 12/28/21  2222 12/28/21  1918 12/28/21  1553   LACTIC ACID mmol/L 1 2 2 0 1 7 2 9*   PROCALCITONIN ng/ml  --   --  0 11  --        Lines/Drains:  Invasive Devices  Report    Peripheral Intravenous Line            Peripheral IV 12/28/21 Right Antecubital <1 day                Imaging: Reviewed radiology reports from this admission including: chest xray    Recent Cultures (last 7 days):   Results from last 7 days   Lab Units 12/28/21  1553   BLOOD CULTURE  Received in Microbiology Lab  Culture in Progress  Received in Microbiology Lab  Culture in Progress         Last 24 Hours Medication List:   Current Facility-Administered Medications   Medication Dose Route Frequency Provider Last Rate    acetaminophen  650 mg Oral Q6H PRN Dawood Patrick MD      amLODIPine  10 mg Oral Daily Dawood Patrick MD      cefTRIAXone  2,000 mg Intravenous Q24H Katelynn Greene DO      dexamethasone  6 mg Intravenous Q24H Ruddy Arellano MD      doxycycline hyclate  100 mg Oral Q12H Ruddy Arellano MD      heparin (porcine)  5,000 Units Subcutaneous [facility] Ruddy Arellano MD      insulin lispro  1-5 Units Subcutaneous TID AC Ruddy Arellano MD      remdesivir  100 mg Intravenous Q24H Ruddy Arellano MD      sodium chloride  75 mL/hr Intravenous Continuous Ruddy Arellano MD 75 mL/hr (12/28/21 2225)        Today, Patient Was Seen By: Sharon Ascencio DO    **Please Note: This note may have been constructed using a voice recognition system  **

## 2021-12-29 NOTE — ASSESSMENT & PLAN NOTE
Lab Results   Component Value Date    BUN 20 12/29/2021    BUN 22 12/28/2021    CREATININE 1 40 (H) 12/29/2021    CREATININE 1 74 (H) 12/28/2021     · Avoid nephrotoxic agent,   · Mild fluid hydration 75 cc an hour 12 hours  · AM BMP, follow-up on results     · Baseline creatinine is unknown due to no data

## 2021-12-29 NOTE — UTILIZATION REVIEW
Initial Clinical Review    Admission: Date/Time/Statement:   Admission Orders (From admission, onward)     Ordered        12/28/21 1712  Inpatient Admission  Once                      Orders Placed This Encounter   Procedures    Inpatient Admission     Standing Status:   Standing     Number of Occurrences:   1     Order Specific Question:   Level of Care     Answer:   Med Surg [16]     Order Specific Question:   Estimated length of stay     Answer:   More than 2 Midnights     Order Specific Question:   Certification     Answer:   I certify that inpatient services are medically necessary for this patient for a duration of greater than two midnights  See H&P and MD Progress Notes for additional information about the patient's course of treatment  ED Arrival Information     Expected Arrival Acuity    - 12/28/2021 14:24 Emergent         Means of arrival Escorted by Service Admission type    Wheelchair Family Member Hospitalist Emergency         Arrival complaint    [facility]        Chief Complaint   Patient presents with    Weakness - Generalized     Pt states that she has been feeling weak since having a "cold" a week ago  Initial Presentation: 57 y o  female with a PMH of diabetes type 2, HTN and hx of asthma, not on any inhalerswho presents to ED from home with generalized weakness, fatigue, chills, productive cough for yellow phlegm  Reports that her symptoms began worsening 4 days ago, she has been sick for 1 or 2 weeks  Family states she hasn't been urinating much  On exam, pt febrile 102 5 F, tachycardic, rales present  Pt desat in ED to 88%, started on O2 @4 L with sat now >95 %  CXR shows bilateral pulmonary infiltrates  Labs + COVID, elevated creat 1 74 with unknown baseline, potassium 3 3, D Dimer 1 25, LA 2 9 Pt given IVF boluses, lyte repletion, and Azithromycin in ED  Pt admitted as inpatient with COVID 19 Pneumonia,   Plan - mild path with IV Remdesivir, IV decadron, IV Ceftriaxone, po Doxycycline, obtain procal and d/c abx if neg, CRP 1-3 days, CBC , BMP am, Trend troponin, CK, obtain echo , replete lytes, IVF x 12 hrs, monitor creat  Date: 12/29  Day 2:  Pt remains on O2 @4 L with sat 91-96 %  Procal initial =0 11, reflex pending  Pt having loose watery stools, likely due to  COVID but will obtain stool for c diff   Pt has decreased appetite  Per nursing notes, pt GA with diminished breath sounds  Potassium 3 8 today  Pt remains on IVF, IV remdesivir, IV steroids and po/IV abx       ED Triage Vitals   Temperature Pulse Respirations Blood Pressure SpO2   12/28/21 1509 12/28/21 1509 12/28/21 1509 12/28/21 1509 12/28/21 1509   (!) 102 5 °F (39 2 °C) (!) 121 22 151/73 92 %      Temp Source Heart Rate Source Patient Position - Orthostatic VS BP Location FiO2 (%)   12/28/21 1509 12/28/21 1509 12/28/21 1509 12/28/21 1509 --   Oral Monitor Sitting Left arm       Pain Score       12/28/21 2332       No Pain          Wt Readings from Last 1 Encounters:   12/28/21 89 kg (196 lb 3 4 oz)     Additional Vital Signs:   Date/Time Temp Pulse Resp BP MAP (mmHg) SpO2 Calculated FIO2 (%) - Nasal Cannula Nasal Cannula O2 Flow Rate (L/min) O2 Device   12/29/21 1345 -- 105 -- 154/82 -- -- -- -- --   12/29/21 1258 -- -- -- -- -- -- 36 4 L/min Nasal cannula   12/29/21 1226 99 °F (37 2 °C) 105 18 154/82 111 91 % -- -- Nasal cannula   12/29/21 1115 -- -- -- 133/73 96 96 % 36 4 L/min Nasal cannula     Date/Time Temp Pulse Resp BP MAP (mmHg) SpO2 Calculated FIO2 (%) - Nasal Cannula Nasal Cannula O2 Flow Rate (L/min) O2 Device   12/29/21 0852 -- 90 20 -- -- 96 % 36 4 L/min Nasal cannula   12/29/21 0815 -- -- -- 168/80 115 -- -- -- --   12/29/21 0600 -- 94 -- 175/82 Abnormal  117 96 % -- -- --   12/29/21 0500 -- 100 -- 165/82 113 93 % -- -- --   12/29/21 0445 -- 94 -- -- -- 93 % -- -- --   12/29/21 0300 -- 98 -- 172/84 Abnormal  120 94 % -- -- --   12/29/21 0145 -- 100 -- -- -- 97 % -- -- --   12/29/21 0115 -- 100 -- -- -- 96 % -- -- --   12/29/21 0100 -- 102 -- 180/79 Abnormal  113 95 % -- -- --   12/28/21 2300 -- 104 -- 168/81 116 92 % -- -- --   12/28/21 2100 -- 102 -- 153/72 103 95 % -- -- --   12/28/21 2015 -- 104 -- 153/73 105 93 % -- -- --   12/28/21 2014 -- 104 20 153/73 -- 93 % 36 4 L/min Nasal cannula   12/28/21 1930 -- 100 -- -- -- 89 % Abnormal  -- -- --   12/28/21 1915 -- 100 -- 135/65 94 90 % -- -- None (Room air)   12/28/21 1811 99 4 °F (37 4 °C) 105 20 139/66 -- 91 % -- -- --   12/28/21 1714 -- 104 -- -- -- -- -- -- --   12/28/21 1634 99 4 °F (37 4 °C) -- -- -- -- -- -- -- --   12/28/21 1545 -- 118 Abnormal  -- 139/66 95 92 % --       Pertinent Labs/Diagnostic Test Results:   12/28  CXR-Suspected bilateral pulmonary infiltrates, left greater than right, suspicious for multifocal pneumonia (possibly Covid-19)  Covid testing pending  ECG-ED-ECG rate:  109    ECG rate assessment: tachycardic    Rhythm:     Rhythm: sinus tachycardia    Ectopy:     Ectopy: none    QRS:     QRS axis:  Normal    QRS intervals:  Normal  Conduction:     Conduction: normal    ST segments:     ST segments:  Normal  T waves:     T waves: non-specific    Comments:      No acute ischemia noted    12/29  ECHO=    Results from last 7 days   Lab Units 12/28/21  1633   SARS-COV-2  Positive*     Results from last 7 days   Lab Units 12/28/21  2222 12/28/21  1546   WBC Thousand/uL  --  6 30   HEMOGLOBIN g/dL  --  13 3   HEMATOCRIT %  --  41 3   PLATELETS Thousands/uL 288 325   BANDS PCT %  --  9*         Results from last 7 days   Lab Units 12/29/21  0543 12/28/21  1546   SODIUM mmol/L 140 140   POTASSIUM mmol/L 3 8 3 3*   CHLORIDE mmol/L 106 103   CO2 mmol/L 23 25   ANION GAP mmol/L 11 12   BUN mg/dL 20 22   CREATININE mg/dL 1 40* 1 74*   EGFR ml/min/1 73sq m 40 30   CALCIUM mg/dL 8 4 8 5     Results from last 7 days   Lab Units 12/29/21  0543 12/28/21  1546   AST U/L 33 36   ALT U/L 26 26   ALK PHOS U/L 68 70   TOTAL PROTEIN g/dL 7 1 7 3   ALBUMIN g/dL 2  4* 2 6*   TOTAL BILIRUBIN mg/dL 0 23 0 29     Results from last 7 days   Lab Units 12/29/21  0805 12/28/21  1512   POC GLUCOSE mg/dl 229* 69     Results from last 7 days   Lab Units 12/29/21  0543 12/28/21  1546   GLUCOSE RANDOM mg/dL 170* 82               Results from last 7 days   Lab Units 12/28/21  1918   CK TOTAL U/L 443*   CK MB INDEX % <1 0   CK MB ng/mL <1 0     Results from last 7 days   Lab Units 12/28/21  1918 12/28/21  1750 12/28/21  1546   HS TNI 0HR ng/L  --   --  24   HS TNI 2HR ng/L  --  25 8  --    HSTNI D2 ng/L  --  1 8  --    HS TNI 4HR ng/L 22  --   --    HSTNI D4 ng/L -2  --   --      Results from last 7 days   Lab Units 12/28/21 1918   D-DIMER QUANTITATIVE ug/ml FEU 1 25*             Results from last 7 days   Lab Units 12/28/21  1918   PROCALCITONIN ng/ml 0 11     Results from last 7 days   Lab Units 12/29/21  0030 12/28/21  2222 12/28/21  1918 12/28/21  1553   LACTIC ACID mmol/L 1 2 2 0 1 7 2 9*                             Results from last 7 days   Lab Units 12/28/21  1918   CRP mg/L 82 9*                 Results from last 7 days   Lab Units 12/28/21  1633   INFLUENZA A PCR  Negative   INFLUENZA B PCR  Negative   RSV PCR  Negative                             Results from last 7 days   Lab Units 12/28/21  1553   BLOOD CULTURE  Received in Microbiology Lab  Culture in Progress  Received in Microbiology Lab  Culture in Progress                 ED Treatment:   Medication Administration from 12/28/2021 1424 to 12/29/2021 0949       Date/Time Order Dose Route Action     12/28/2021 1519 acetaminophen (TYLENOL) tablet 650 mg 650 mg Oral Given     12/28/2021 1601 cefTRIAXone (ROCEPHIN) 2,000 mg in dextrose 5 % 50 mL IVPB 2,000 mg Intravenous New Bag     12/28/2021 1702 azithromycin (ZITHROMAX) 500 mg in sodium chloride 0 9% 250mL IVPB 500 mg 500 mg Intravenous New Bag     12/28/2021 1704 sodium chloride 0 9 % bolus 1,000 mL 1,000 mL Intravenous New Bag     12/28/2021 1703 potassium chloride (K-DUR,KLOR-CON) CR tablet 20 mEq 20 mEq Oral Given     12/29/2021 0802 amLODIPine (NORVASC) tablet 10 mg 10 mg Oral Given     12/29/2021 0513 heparin (porcine) subcutaneous injection 5,000 Units 5,000 Units Subcutaneous Given     12/28/2021 2333 heparin (porcine) subcutaneous injection 5,000 Units 5,000 Units Subcutaneous Given     12/28/2021 2021 remdesivir (Veklury) 200 mg in sodium chloride 0 9 % 290 mL IVPB 200 mg Intravenous New Bag     12/28/2021 2019 dexamethasone (DECADRON) injection 6 mg 6 mg Intravenous Given     12/29/2021 0759 doxycycline hyclate (VIBRAMYCIN) capsule 100 mg 100 mg Oral Given     12/28/2021 2225 sodium chloride 0 9 % infusion 75 mL/hr Intravenous New Bag     12/28/2021 2016 potassium chloride (K-DUR,KLOR-CON) CR tablet 40 mEq 40 mEq Oral Given     12/29/2021 3998 insulin lispro (HumaLOG) 100 units/mL subcutaneous injection 1-5 Units 2 Units Subcutaneous Given        Past Medical History:   Diagnosis Date    Asthma     Diabetes mellitus ([facility] [address] )     Hypercholesteremia     Hypertension      Present on Admission:  **None**      Admitting Diagnosis: Weakness [R53 1]  Age/Sex: 57 y o  female  Admission Orders:  Scheduled Medications:  amLODIPine, 10 mg, Oral, Daily  cefTRIAXone, 2,000 mg, Intravenous, Q24H  dexamethasone, 6 mg, Intravenous, Q24H  doxycycline hyclate, 100 mg, Oral, Q12H  heparin (porcine), 5,000 Units, Subcutaneous, Q8H JAHAIRA  insulin lispro, 1-5 Units, Subcutaneous, TID AC  remdesivir, 100 mg, Intravenous, Q24H      Continuous IV Infusions:  sodium chloride, 75 mL/hr, Intravenous, Continuous      PRN Meds:  acetaminophen, 650 mg, Oral, Q6H PRN      SCD's  OOB  Titrate O2 to keep sat at least 90%  Ambulation  Contact and airborne isolation    Network Utilization Review Department  ATTENTION: Please call with any questions or concerns to [phone] and carefully listen to the prompts so that you are directed to the right person   All voicemails are confidential   Mikeea Jen all requests for admission clinical reviews, approved or denied determinations and any other requests to dedicated fax number below belonging to the campus where the patient is receiving treatment   List of dedicated fax numbers for the Facilities:  [address] [place] [address] DENIALS (Administrative/Medical Necessity) [phone]   [address] (Maternity/NICU/Pediatrics) [phone]   [address]  [address] [facility] [place] [address] [id number] [address] [facility] [address] [facility] [place] [address] [phone]

## 2021-12-29 NOTE — ASSESSMENT & PLAN NOTE
Lab Results   Component Value Date    K 3 8 12/29/2021     · Replete potassium >4 0  · Follow-up on am BMP

## 2021-12-29 NOTE — ASSESSMENT & PLAN NOTE
No results found for: HGBA1C    Recent Labs     12/28/21  1512 12/29/21  0805 12/29/21  1104   POCGLU 69 229* 272*       Blood Sugar Average: Last 72 hrs:  (P) 190     On metformin and glimepiride  Starting on sliding scale, algorithm 2, no weight on file, adjust as appropriate  Her doctor in [place] is KAREN Kelsey

## 2021-12-29 NOTE — H&P
[facility]  H&P- [facility] 1960, 57 y o  female MRN: [id number]  Unit/Bed#: ED 26 Encounter: [id number]  Primary Care Provider: Adis Flood   Date and time admitted to hospital: 12/28/2021  3:16 PM    * Pneumonia due to COVID-19 virus  Assessment & Plan  70-year-old female, PMH DM T2, H TN, history of asthma presents to the ED with 4 days of generalized weakness and productive cough  Reports feeling weak with chills   5 fever, tachycardia, CXR with patchy infiltrates  LA 2 9  Tested positive for COVID-19  Initially on room air, however, patient started desaturating 88%, was started on 4L O2 on NC  Lab Results   Component Value Date    WBC 6 30 12/28/2021    DDIMER 1 25 (H) 12/28/2021    HSTNID2 1 8 12/28/2021    HSTNID4 -2 12/28/2021       Mild covid pathway was initiated:  · ECHO - for SOB, f/u on results  · Decadron 6 mg 10 days  · Remdesivir 200 mg day1 then 100 mg q24 IV  · Doxycyline 100 mg + Ceftriaxone 1g q24 , cannot rule out bacterial pneumonia, D/C if pro-christiana is negative  Labs : pro-christiana with am reflex, CRP 1-3 days, CBC , BMP am, Trend troponin, CK  Might need to add Baracitinib and Atorvastatin if switching to Moderate pathway per protocol  SIRS (systemic inflammatory response syndrome) (HCC)  Assessment & Plan  POA tachycardia, fever 102 5, no leukocytosis,   XR chest 1 view portable :  12/28/2021  Impression: Suspected bilateral pulmonary infiltrates, left greater than right, suspicious for multifocal pneumonia (possibly Covid-19)  Vitals  Blood Pressure: 151/73  Temperature: (!) 102 5 °F (39 2 °C)  Temp Source: Oral  Pulse: (!) 121  Respirations: 22  SpO2: 92 %    Likely in the setting of covid-19 pneumonia, treatment plan as above  Will start anti-biotics, D/C abx if pro-calcitonin is negative       Hypokalemia  Assessment & Plan  Lab Results   Component Value Date    K 3 3 (L) 12/28/2021       · Replete potassium >4 0  · Follow-up on am BMP  · Received 20 mEq in ED,   · Ordered 40mEQ PO      Diabetes ([address] )  Assessment & Plan  No results found for: HGBA1C    Recent Labs     12/28/21  1512   POCGLU 69       Blood Sugar Average: Last 72 hrs:  (P) 69     On metformin and glimepiride  Starting on sliding scale, algorithm 2, no weight on file, adjust as appropriate  Her doctor in [place] is KAREN Kelsey  HTN (hypertension)  Assessment & Plan    BP Readings from Last 3 Encounters:   12/28/21 139/66     Continue Amlodipine 10 mg qd    MARQUITA (acute kidney injury) [facility])  Assessment & Plan  Lab Results   Component Value Date    BUN 22 12/28/2021    CREATININE 1 74 (H) 12/28/2021       · Avoid nephrotoxic agent,   · Mild fluid hydration 75 cc an hour 12 hours  · A BMP, follow-up on results  VTE Pharmacologic Prophylaxis: VTE Score: 5 High Risk (Score >/= 5) - Pharmacological DVT Prophylaxis Ordered: heparin  Sequential Compression Devices Ordered  Code Status: Level-1 , Full Code  Discussion with family: Updated  (daughter) at bedside  Anticipated Length of Stay: Patient will be admitted on an inpatient basis with an anticipated length of stay of greater than 2 midnights secondary to Covid-19 pneumonia  Chief Complaint:  Fatigue and weakness  History of Present Illness:  Danny Padron is a 57 y o  female with a PMH of diabetes type 2, HTN and hx of asthma, not on any inhalers presents with generalized weakness, fatigue, chills  Reports that her symptoms began worsening 4 days ago, she has been sick for 1 or 2 weeks, however for the past few days the weakness got worse, she was slouching in a chair  Has been coughing productive yellow phlegm  In the ED she was found to be tachycardic, with a fever of 102 5, chest x-ray with findings of bilateral pulmonary infiltrates, patient tested positive for COVID-19  Patient is not vaccinated for COVID-19, reports that her daughter was also sick at home     She was also found to have elevated creatinine, no baseline available, likely MARQUITA in the setting of dehydration, patient reports that her mother has not been urinating much lately  In ED IVF boluses were given and azithromycin  Patient was hospitalized, for COVID 19 pneumonia, started on mild COVID protocol  While in ED, patient mildly desaturated to 88-91%, started on oxygen 4 L NC, saturating over 95%  On exam patient denies any chest, shortness of breath, reports mild cough, denies any headache, lightheadedness, dizziness, nausea, vomiting, abdominal pain, dysuria , loss of smell or loss of taste  Reports feeling very weak and generally tired  She was also found to have potassium of 3 3, D-dimer 1 25, lactic acid 2 9, troponin 0,2,4 WNL, delta 2hs - 1 8  Review of Systems:  Review of Systems   Constitutional: Positive for activity change, chills, fatigue and fever  HENT: Negative for ear pain, sore throat and trouble swallowing  Eyes: Positive for pain  Negative for visual disturbance  Respiratory: Positive for cough  Negative for choking, chest tightness, shortness of breath and wheezing  Cardiovascular: Negative for chest pain and palpitations  Gastrointestinal: Negative for abdominal pain and vomiting  Genitourinary: Negative for dysuria and hematuria  Musculoskeletal: Negative for arthralgias and back pain  Skin: Negative for color change and rash  Neurological: Negative for dizziness, seizures, syncope, light-headedness and headaches  Psychiatric/Behavioral: Negative for confusion and decreased concentration  All other systems reviewed and are negative  Past Medical and Surgical History:   Past Medical History:   Diagnosis Date    Asthma     Diabetes mellitus ([facility] [address] )     Hypercholesteremia     Hypertension        No past surgical history on file  Meds/Allergies:  Prior to Admission medications    Medication Sig Start Date End Date Taking?  Authorizing Provider   amLODIPine (NORVASC) 10 mg tablet Take 10 mg by mouth daily   Yes Historical Provider, MD   glimepiride (AMARYL) 4 mg tablet Take 4 mg by mouth daily with breakfast   Yes Historical Provider, MD   metFORMIN (GLUCOPHAGE) 1000 MG tablet Take 1,000 mg by mouth 2 (two) times a day with meals   Yes Historical Provider, MD     I have reviewed home medications with patient personally  Allergies: No Known Allergies    Social History:  Marital Status: Single   Occupation: N/A  Patient Pre-hospital Living Situation: Home  Patient Pre-hospital Level of Mobility: walks  Patient Pre-hospital Diet Restrictions: none  Substance Use History:   Social History     Substance and Sexual Activity   Alcohol Use Never     Social History     Tobacco Use   Smoking Status Never Smoker   Smokeless Tobacco Never Used     Social History     Substance and Sexual Activity   Drug Use Never       Family History:  No family history on file  Physical Exam:     Vitals:   Blood Pressure: 139/66 (12/28/21 1811)  Pulse: 105 (12/28/21 1811)  Temperature: 99 4 °F (37 4 °C) (12/28/21 1811)  Temp Source: Oral (12/28/21 1811)  Respirations: 20 (12/28/21 1811)  SpO2: 91 % (12/28/21 1811)    Physical Exam  Vitals and nursing note reviewed  Constitutional:       General: She is not in acute distress  Appearance: She is well-developed  She is ill-appearing  She is not diaphoretic  Interventions: She is not intubated  HENT:      Head: Normocephalic and atraumatic  Nose: Nose normal       Mouth/Throat:      Mouth: Mucous membranes are moist    Eyes:      General: No scleral icterus  Conjunctiva/sclera: Conjunctivae normal    Neck:      Trachea: No tracheal deviation  Cardiovascular:      Rate and Rhythm: Regular rhythm  Tachycardia present  Pulses: Normal pulses  Radial pulses are 2+ on the right side and 2+ on the left side  Dorsalis pedis pulses are 2+ on the right side and 2+ on the left side        Heart sounds: Normal heart sounds, S1 normal and S2 normal  No murmur heard  No friction rub  No gallop  Pulmonary:      Effort: Pulmonary effort is normal  No respiratory distress  She is not intubated  Breath sounds: No stridor  Rales present  No decreased breath sounds or wheezing  Chest:      Chest wall: No tenderness  Abdominal:      General: Bowel sounds are normal  There is no distension  Palpations: Abdomen is soft  There is no mass  Tenderness: There is no abdominal tenderness  Musculoskeletal:         General: No tenderness or deformity  Right lower leg: No edema  Left lower leg: No edema  Lymphadenopathy:      Cervical: No cervical adenopathy  Skin:     General: Skin is warm and dry  Coloration: Skin is not pale  Findings: No erythema  Neurological:      Mental Status: She is alert and oriented to person, place, and time  Psychiatric:         Speech: Speech normal          Behavior: Behavior normal          Thought Content:  Thought content normal          Judgment: Judgment normal         Additional Data:     Lab Results:  Results from last 7 days   Lab Units 12/28/21  1546   WBC Thousand/uL 6 30   HEMOGLOBIN g/dL 13 3   HEMATOCRIT % 41 3   PLATELETS Thousands/uL 325   BANDS PCT % 9*   LYMPHO PCT % 8*   MONO PCT % 9   EOS PCT % 0     Results from last 7 days   Lab Units 12/28/21  1546   SODIUM mmol/L 140   POTASSIUM mmol/L 3 3*   CHLORIDE mmol/L 103   CO2 mmol/L 25   BUN mg/dL 22   CREATININE mg/dL 1 74*   ANION GAP mmol/L 12   CALCIUM mg/dL 8 5   ALBUMIN g/dL 2 6*   TOTAL BILIRUBIN mg/dL 0 29   ALK PHOS U/L 70   ALT U/L 26   AST U/L 36   GLUCOSE RANDOM mg/dL 82         Results from last 7 days   Lab Units 12/28/21  1512   POC GLUCOSE mg/dl 69         Results from last 7 days   Lab Units 12/28/21  1553   LACTIC ACID mmol/L 2 9*       Imaging: Reviewed radiology reports from this admission including: chest xray  XR chest 1 view portable   ED Interpretation by Debby Winetr Phuc Murillo DO (12/28 1619)   Consolidation in the right lung field with possible consolidation versus cardiomegaly and a left lower lung field  Final Result by Brandie Mosqueda DO (12/28 1646)      Suspected bilateral pulmonary infiltrates, left greater than right, suspicious for multifocal pneumonia (possibly Covid-19)  Covid testing pending  Workstation performed: [id number]             EKG and Other Studies Reviewed on Admission:   · EKG: NSR    ** Please Note: This note has been constructed using a voice recognition system   **

## 2021-12-29 NOTE — ASSESSMENT & PLAN NOTE
POA tachycardia, fever 102 5, no leukocytosis  XR chest 1 view portable :  12/28/2021  Impression: Suspected bilateral pulmonary infiltrates, left greater than right, suspicious for multifocal pneumonia (possibly Covid-19)  Vitals  Blood Pressure: 151/73  Temperature: (!) 102 5 °F (39 2 °C)  Temp Source: Oral  Pulse: (!) 121  Respirations: 22  SpO2: 92 %  Height: 5' 2" (157 5 cm)  Weight - Scale: 89 kg (196 lb 3 4 oz)    Likely in the setting of covid-19 pneumonia, treatment plan as above     Will start antibiotics, D/C abx if pro-calcitonin is negative-- negative x1, will await second level

## 2021-12-29 NOTE — PLAN OF CARE
Problem: Potential for Falls  Goal: Patient will remain free of falls  Description: INTERVENTIONS:  - Educate patient/family on patient safety including physical limitations  - Instruct patient to call for assistance with activity   - Consult OT/PT to assist with strengthening/mobility   - Keep Call bell within reach  - Keep bed low and locked with side rails adjusted as appropriate  - Keep care items and personal belongings within reach  - Initiate and maintain comfort rounds  - Make Fall Risk Sign visible to staff  - Offer Toileting every Hours, in advance of need  - Initiate/Maintain alarm  - Obtain necessary fall risk management equipment:   - Apply yellow socks and bracelet for high fall risk patients  - Consider moving patient to room near nurses station  Outcome: Progressing     Problem: RESPIRATORY - ADULT  Goal: Achieves optimal ventilation and oxygenation  Description: INTERVENTIONS:  - Assess for changes in respiratory status  - Assess for changes in mentation and behavior  - Position to facilitate oxygenation and minimize respiratory effort  - Oxygen administered by appropriate delivery if ordered  - Initiate smoking cessation education as indicated  - Encourage broncho-pulmonary hygiene including cough, deep breathe, Incentive Spirometry  - Assess the need for suctioning and aspirate as needed  - Assess and instruct to report SOB or any respiratory difficulty  - Respiratory Therapy support as indicated  Outcome: Progressing     Problem: METABOLIC, FLUID AND ELECTROLYTES - ADULT  Goal: Electrolytes maintained within normal limits  Description: INTERVENTIONS:  - Monitor labs and assess patient for signs and symptoms of electrolyte imbalances  - Administer electrolyte replacement as ordered  - Monitor response to electrolyte replacements, including repeat lab results as appropriate  - Instruct patient on fluid and nutrition as appropriate  Outcome: Progressing  Goal: Fluid balance maintained  Description: INTERVENTIONS:  - Monitor labs   - Monitor I/O and WT  - Instruct patient on fluid and nutrition as appropriate  - Assess for signs & symptoms of volume excess or deficit  Outcome: Progressing  Goal: Glucose maintained within target range  Description: INTERVENTIONS:  - Monitor Blood Glucose as ordered  - Assess for signs and symptoms of hyperglycemia and hypoglycemia  - Administer ordered medications to maintain glucose within target range  - Assess nutritional intake and initiate nutrition service referral as needed  Outcome: Progressing     Problem: INFECTION - ADULT  Goal: Absence or prevention of progression during hospitalization  Description: INTERVENTIONS:  - Assess and monitor for signs and symptoms of infection  - Monitor lab/diagnostic results  - Monitor all insertion sites, i e  indwelling lines, tubes, and drains  - Monitor endotracheal if appropriate and nasal secretions for changes in amount and color  - [place] appropriate cooling/warming therapies per order  - Administer medications as ordered  - Instruct and encourage patient and family to use good hand hygiene technique  - Identify and instruct in appropriate isolation precautions for identified infection/condition  Outcome: Progressing     Problem: DISCHARGE PLANNING  Goal: Discharge to home or other facility with appropriate resources  Description: INTERVENTIONS:  - Identify barriers to discharge w/patient and caregiver  - Arrange for needed discharge resources and transportation as appropriate  - Identify discharge learning needs (meds, wound care, etc )  - Arrange for interpretive services to assist at discharge as needed  - Refer to Case Management Department for coordinating discharge planning if the patient needs post-hospital services based on physician/advanced practitioner order or complex needs related to functional status, cognitive ability, or social support system  Outcome: Progressing     Problem: Knowledge Deficit  Goal: Patient/family/caregiver demonstrates understanding of disease process, treatment plan, medications, and discharge instructions  Description: Complete learning assessment and assess knowledge base    Interventions:  - Provide teaching at level of understanding  - Provide teaching via preferred learning methods  Outcome: Progressing

## 2021-12-30 PROBLEM — E87.6 HYPOKALEMIA: Status: RESOLVED | Noted: 2021-12-28 | Resolved: 2021-12-30

## 2021-12-30 PROBLEM — R65.10 SIRS (SYSTEMIC INFLAMMATORY RESPONSE SYNDROME) (HCC): Status: RESOLVED | Noted: 2021-12-28 | Resolved: 2021-12-30

## 2021-12-30 LAB
ATRIAL RATE: 113 BPM
GLUCOSE SERPL-MCNC: 269 MG/DL (ref 65–140)
GLUCOSE SERPL-MCNC: 289 MG/DL (ref 65–140)
GLUCOSE SERPL-MCNC: 413 MG/DL (ref 65–140)
GLUCOSE SERPL-MCNC: 433 MG/DL (ref 65–140)
P AXIS: 56 DEGREES
PR INTERVAL: 142 MS
QRS AXIS: 9 DEGREES
QRSD INTERVAL: 94 MS
QT INTERVAL: 300 MS
QTC INTERVAL: 404 MS
T WAVE AXIS: 21 DEGREES
VENTRICULAR RATE: 109 BPM

## 2021-12-30 PROCEDURE — 93010 ELECTROCARDIOGRAM REPORT: CPT | Performed by: INTERNAL MEDICINE

## 2021-12-30 PROCEDURE — 99232 SBSQ HOSP IP/OBS MODERATE 35: CPT | Performed by: INTERNAL MEDICINE

## 2021-12-30 PROCEDURE — 82948 REAGENT STRIP/BLOOD GLUCOSE: CPT

## 2021-12-30 RX ORDER — ATORVASTATIN CALCIUM 40 MG/1
40 TABLET, FILM COATED ORAL
Status: DISCONTINUED | OUTPATIENT
Start: 2021-12-30 | End: 2021-12-30

## 2021-12-30 RX ORDER — INSULIN GLARGINE 100 [IU]/ML
10 INJECTION, SOLUTION SUBCUTANEOUS
Status: DISCONTINUED | OUTPATIENT
Start: 2021-12-30 | End: 2021-12-31

## 2021-12-30 RX ADMIN — REMDESIVIR 100 MG: 100 INJECTION, POWDER, LYOPHILIZED, FOR SOLUTION INTRAVENOUS at 19:48

## 2021-12-30 RX ADMIN — INSULIN LISPRO 5 UNITS: 100 INJECTION, SOLUTION INTRAVENOUS; SUBCUTANEOUS at 17:57

## 2021-12-30 RX ADMIN — HEPARIN SODIUM 5000 UNITS: 5000 INJECTION INTRAVENOUS; SUBCUTANEOUS at 23:01

## 2021-12-30 RX ADMIN — INSULIN GLARGINE 10 UNITS: 100 INJECTION, SOLUTION SUBCUTANEOUS at 23:01

## 2021-12-30 RX ADMIN — HEPARIN SODIUM 5000 UNITS: 5000 INJECTION INTRAVENOUS; SUBCUTANEOUS at 14:59

## 2021-12-30 RX ADMIN — INSULIN LISPRO 5 UNITS: 100 INJECTION, SOLUTION INTRAVENOUS; SUBCUTANEOUS at 08:13

## 2021-12-30 RX ADMIN — AMLODIPINE BESYLATE 10 MG: 10 TABLET ORAL at 08:13

## 2021-12-30 RX ADMIN — HEPARIN SODIUM 5000 UNITS: 5000 INJECTION INTRAVENOUS; SUBCUTANEOUS at 05:39

## 2021-12-30 RX ADMIN — DEXAMETHASONE SODIUM PHOSPHATE 6 MG: 4 INJECTION INTRA-ARTICULAR; INTRALESIONAL; INTRAMUSCULAR; INTRAVENOUS; SOFT TISSUE at 17:45

## 2021-12-30 RX ADMIN — INSULIN LISPRO 2 UNITS: 100 INJECTION, SOLUTION INTRAVENOUS; SUBCUTANEOUS at 17:56

## 2021-12-30 RX ADMIN — BARICITINIB 2 MG: 2 TABLET, FILM COATED ORAL at 14:59

## 2021-12-30 RX ADMIN — INSULIN LISPRO 5 UNITS: 100 INJECTION, SOLUTION INTRAVENOUS; SUBCUTANEOUS at 12:36

## 2021-12-30 NOTE — PROGRESS NOTES
[facility]  Progress Note - Drew Barlow 1960, 57 y o  female MRN: [id number]  Unit/Bed#: S -01 Encounter: [id number]  Primary Care Provider: Martha Rodriguez   Date and time admitted to hospital: 12/28/2021  3:16 PM    * Pneumonia due to COVID-19 virus  Assessment & Plan  58-year-old female, PMH DM T2, HTN, history of asthma presents to the ED with 4 days of generalized weakness and productive cough  Reports feeling weak with chills   5 fever, tachycardia, CXR with patchy infiltrates  LA 2 9  Tested positive for COVID-19  Initially on room air, however, patient started desaturating 88%, was started on 2L O2 on NC--> 4L NC  Lab Results   Component Value Date    WBC 6 30 12/28/2021    DDIMER 1 25 (H) 12/28/2021    CKTOTAL 443 (H) 12/28/2021    HSTNID2 1 8 12/28/2021    HSTNID4 -2 12/28/2021     Clostridium difficile: no result    Mild covid pathway was initiated:  Decadron 6 mg 10 days (day 3/10)  Remdesivir (day 3/5)  Baracitinib and Atorvastatin    Diabetes [facility])  Assessment & Plan  No results found for: HGBA1C    Recent Labs     12/29/21  1104 12/29/21  1539 12/30/21  0722 12/30/21  1056   POCGLU 272* 354* 413* 433*       Blood Sugar Average: Last 72 hrs:  (P) 295     On metformin and glimepiride at home  Starting on sliding scale, algorithm 2, will also add 10 units basal and 5 units with each meal due to steroid induced hyperglycemia  Her doctor in [place] is KAREN Kelsey       HTN (hypertension)  Assessment & Plan    BP Readings from Last 3 Encounters:   12/30/21 150/89     Continue Amlodipine 10 mg qd    MARQUITA (acute kidney injury) [facility])  Assessment & Plan  Lab Results   Component Value Date    BUN 20 12/29/2021    BUN 22 12/28/2021    CREATININE 1 40 (H) 12/29/2021    CREATININE 1 74 (H) 12/28/2021     · Avoid nephrotoxic agents  · AM BMP  · Baseline creatinine is unknown due to no data    Hypokalemia-resolved as of 12/30/2021  Assessment & Plan  Lab Results Component Value Date    K 3 8 2021   Follow-up on am BMP    SIRS (systemic inflammatory response syndrome) (HCC)-resolved as of 2021  Assessment & Plan  POA tachycardia, fever 102 5, no leukocytosis  XR chest 1 view portable :  2021  Impression: Suspected bilateral pulmonary infiltrates, left greater than right, suspicious for multifocal pneumonia (possibly Covid-19)  Vitals  Blood Pressure: 150/89  Temperature: 97 9 °F (36 6 °C)  Temp Source: Oral  Pulse: 99  Respirations: 17  SpO2: 97 %  Height: 5' 3" (160 cm)  Weight - Scale: 88 kg (194 lb)  Likely in the setting of covid-19 pneumonia, treatment plan as above  VTE Pharmacologic Prophylaxis: VTE Score: 5 High Risk (Score >/= 5) - Pharmacological DVT Prophylaxis Ordered: heparin  Sequential Compression Devices Ordered  Patient Centered Rounds: I evaluated the patient without nursing staff present due to [address] with Specialists or Other Care Team Provider: CM, attending    Education and Discussions with Family / Patient: Attempted to update  (daughter) via phone  Unable to contact  Number on file does not work  Time Spent for Care: 30 minutes  More than 50% of total time spent on counseling and coordination of care as described above  Current Length of Stay: 2 day(s)  Current Patient Status: Inpatient   Certification Statement: The patient will continue to require additional inpatient hospital stay due to COVID pneumonia  Discharge Plan: Anticipate discharge in 48-72 hrs to home  Code Status: Level 1 - Full Code    Subjective:   Patient feels much better than yesterday this morning on my assessment, she was on 3L via NC, per chart review she is on 4L currently  She states she has not had any more diarrhea, and her appetite has returned       Objective:   Vitals:   Temp (24hrs), Av 5 °F (36 9 °C), Min:97 9 °F (36 6 °C), Max:98 8 °F (37 1 °C)    Temp:  [97 9 °F (36 6 °C)-98 8 °F (37 1 °C)] 97 9 °F (36 6 °C)  HR:  [] 99  Resp:  [16-17] 17  BP: (150-169)/(82-89) 150/89  SpO2:  [94 %-97 %] 97 %  Body mass index is 34 37 kg/m²  Input and Output Summary (last 24 hours): Intake/Output Summary (Last 24 hours) at 12/30/2021 1320  Last data filed at 12/29/2021 1541  Gross per 24 hour   Intake 240 ml   Output --   Net 240 ml       Physical Exam:   Physical Exam  Vitals and nursing note reviewed  Constitutional:       General: She is not in acute distress  Appearance: She is well-developed  Interventions: Nasal cannula in place  HENT:      Head: Normocephalic and atraumatic  Mouth/Throat:      Mouth: Mucous membranes are moist    Eyes:      Extraocular Movements: Extraocular movements intact  Conjunctiva/sclera: Conjunctivae normal       Pupils: Pupils are equal, round, and reactive to light  Cardiovascular:      Rate and Rhythm: Normal rate and regular rhythm  Pulses: Normal pulses  Heart sounds: Normal heart sounds  No murmur heard  Pulmonary:      Effort: Pulmonary effort is normal  No respiratory distress  Abdominal:      General: Abdomen is flat  Bowel sounds are normal  There is no distension  Palpations: Abdomen is soft  Tenderness: There is no abdominal tenderness  Musculoskeletal:         General: No swelling or tenderness  Cervical back: Neck supple  Skin:     General: Skin is warm and dry  Neurological:      Mental Status: She is alert and oriented to person, place, and time  Psychiatric:         Mood and Affect: Mood normal           Additional Data:     Labs:  Results from last 7 days   Lab Units 12/28/21  2222 12/28/21  1546 12/28/21  1546   WBC Thousand/uL  --   --  6 30   HEMOGLOBIN g/dL  --   --  13 3   HEMATOCRIT %  --   --  41 3   PLATELETS Thousands/uL 288   < > 325   BANDS PCT %  --   --  9*   LYMPHO PCT %  --   --  8*   MONO PCT %  --   --  9   EOS PCT %  --   --  0    < > = values in this interval not displayed  Results from last 7 days   Lab Units 12/29/21  0543   SODIUM mmol/L 140   POTASSIUM mmol/L 3 8   CHLORIDE mmol/L 106   CO2 mmol/L 23   BUN mg/dL 20   CREATININE mg/dL 1 40*   ANION GAP mmol/L 11   CALCIUM mg/dL 8 4   ALBUMIN g/dL 2 4*   TOTAL BILIRUBIN mg/dL 0 23   ALK PHOS U/L 68   ALT U/L 26   AST U/L 33   GLUCOSE RANDOM mg/dL 170*         Results from last 7 days   Lab Units 12/30/21  1056 12/30/21  0722 12/29/21  1539 12/29/21  1104 12/29/21  0805 12/28/21  1512   POC GLUCOSE mg/dl 433* 413* 354* 272* 229* 69         Results from last 7 days   Lab Units 12/29/21  0543 12/29/21  0030 12/28/21  2222 12/28/21  1918 12/28/21  1553   LACTIC ACID mmol/L  --  1 2 2 0 1 7 2 9*   PROCALCITONIN ng/ml 0 09  --   --  0 11  --        Lines/Drains:  Invasive Devices  Report    Peripheral Intravenous Line            Peripheral IV 12/28/21 Right Antecubital 1 day                Imaging: Reviewed radiology reports from this admission including: chest xray    Recent Cultures (last 7 days):   Results from last 7 days   Lab Units 12/28/21  1553   BLOOD CULTURE  No Growth at 24 hrs  No Growth at 24 hrs         Last 24 Hours Medication List:   Current Facility-Administered Medications   Medication Dose Route Frequency Provider Last Rate    acetaminophen  650 mg Oral Q6H PRN Dawood Patrick MD      amLODIPine  10 mg Oral Daily Dawood Patrick MD      atorvastatin  40 mg Oral Daily With Day Canaan, DO      Baricitinib  2 mg Oral Q24H Misael Choi MD      dexamethasone  6 mg Intravenous Q24H Dawood Patrick MD      heparin (porcine)  5,000 Units Subcutaneous [facility] Dawood Patrick MD      insulin glargine  10 Units Subcutaneous HS Katelynn Greene DO      insulin lispro  1-5 Units Subcutaneous TID AC Dawood Patrick MD      insulin lispro  5 Units Subcutaneous TID With Meals Katelynn Greene DO      remdesivir  100 mg Intravenous Q24H Dawood Patrick MD          Today, Patient Was Seen By: Mike Pate DO Tyson    **Please Note: This note may have been constructed using a voice recognition system  **

## 2021-12-30 NOTE — ASSESSMENT & PLAN NOTE
48-year-old female, PMH DM T2, HTN, history of asthma presents to the ED with 4 days of generalized weakness and productive cough  Reports feeling weak with chills   5 fever, tachycardia, CXR with patchy infiltrates  LA 2 9  Tested positive for COVID-19  Lab Results   Component Value Date    WBC 6 30 12/28/2021    DDIMER 1 25 (H) 12/28/2021    CKTOTAL 443 (H) 12/28/2021    HSTNID2 1 8 12/28/2021    HSTNID4 -2 12/28/2021     Improved  Stable for discharge

## 2021-12-30 NOTE — PLAN OF CARE
Problem: Potential for Falls  Goal: Patient will remain free of falls  Description: INTERVENTIONS:  - Educate patient/family on patient safety including physical limitations  - Instruct patient to call for assistance with activity   - Consult OT/PT to assist with strengthening/mobility   - Keep Call bell within reach  - Keep bed low and locked with side rails adjusted as appropriate  - Keep care items and personal belongings within reach  - Initiate and maintain comfort rounds  - Make Fall Risk Sign visible to staff  - Offer Toileting every  Hours, in advance of need  - Initiate/Maintain alarm  - Obtain necessary fall risk management equipment:  - Apply yellow socks and bracelet for high fall risk patients  - Consider moving patient to room near nurses station  Outcome: Progressing     Problem: RESPIRATORY - ADULT  Goal: Achieves optimal ventilation and oxygenation  Description: INTERVENTIONS:  - Assess for changes in respiratory status  - Assess for changes in mentation and behavior  - Position to facilitate oxygenation and minimize respiratory effort  - Oxygen administered by appropriate delivery if ordered  - Initiate smoking cessation education as indicated  - Encourage broncho-pulmonary hygiene including cough, deep breathe, Incentive Spirometry  - Assess the need for suctioning and aspirate as needed  - Assess and instruct to report SOB or any respiratory difficulty  - Respiratory Therapy support as indicated  Outcome: Progressing     Problem: METABOLIC, FLUID AND ELECTROLYTES - ADULT  Goal: Electrolytes maintained within normal limits  Description: INTERVENTIONS:  - Monitor labs and assess patient for signs and symptoms of electrolyte imbalances  - Administer electrolyte replacement as ordered  - Monitor response to electrolyte replacements, including repeat lab results as appropriate  - Instruct patient on fluid and nutrition as appropriate  Outcome: Progressing  Goal: Fluid balance maintained  Description: INTERVENTIONS:  - Monitor labs   - Monitor I/O and WT  - Instruct patient on fluid and nutrition as appropriate  - Assess for signs & symptoms of volume excess or deficit  Outcome: Progressing  Goal: Glucose maintained within target range  Description: INTERVENTIONS:  - Monitor Blood Glucose as ordered  - Assess for signs and symptoms of hyperglycemia and hypoglycemia  - Administer ordered medications to maintain glucose within target range  - Assess nutritional intake and initiate nutrition service referral as needed  Outcome: Progressing     Problem: INFECTION - ADULT  Goal: Absence or prevention of progression during hospitalization  Description: INTERVENTIONS:  - Assess and monitor for signs and symptoms of infection  - Monitor lab/diagnostic results  - Monitor all insertion sites, i e  indwelling lines, tubes, and drains  - Monitor endotracheal if appropriate and nasal secretions for changes in amount and color  - [place] appropriate cooling/warming therapies per order  - Administer medications as ordered  - Instruct and encourage patient and family to use good hand hygiene technique  - Identify and instruct in appropriate isolation precautions for identified infection/condition  Outcome: Progressing     Problem: DISCHARGE PLANNING  Goal: Discharge to home or other facility with appropriate resources  Description: INTERVENTIONS:  - Identify barriers to discharge w/patient and caregiver  - Arrange for needed discharge resources and transportation as appropriate  - Identify discharge learning needs (meds, wound care, etc )  - Arrange for interpretive services to assist at discharge as needed  - Refer to Case Management Department for coordinating discharge planning if the patient needs post-hospital services based on physician/advanced practitioner order or complex needs related to functional status, cognitive ability, or social support system  Outcome: Progressing     Problem: Knowledge Deficit  Goal: Patient/family/caregiver demonstrates understanding of disease process, treatment plan, medications, and discharge instructions  Description: Complete learning assessment and assess knowledge base    Interventions:  - Provide teaching at level of understanding  - Provide teaching via preferred learning methods  Outcome: Progressing

## 2021-12-30 NOTE — ASSESSMENT & PLAN NOTE
BP Readings from Last 3 Encounters:   12/31/21 126/91     · Blood pressure reviewed and acceptable    · Continue amlodipine

## 2021-12-30 NOTE — ASSESSMENT & PLAN NOTE
Lab Results   Component Value Date    BUN 20 12/29/2021    BUN 22 12/28/2021    CREATININE 1 40 (H) 12/29/2021    CREATININE 1 74 (H) 12/28/2021     · Baseline creatinine is unknown due to no data  · Improved with IV fluid hydration  · Follow-up with PCP, may require ACE-inhibitor for renal function preservation in setting of diabetes    · Avoid nephrotoxic agents

## 2021-12-30 NOTE — ASSESSMENT & PLAN NOTE
POA tachycardia, fever 102 5, no leukocytosis  XR chest 1 view portable :  12/28/2021  Impression: Suspected bilateral pulmonary infiltrates, left greater than right, suspicious for multifocal pneumonia (possibly Covid-19)  Vitals  Blood Pressure: 150/89  Temperature: 97 9 °F (36 6 °C)  Temp Source: Oral  Pulse: 99  Respirations: 17  SpO2: 97 %  Height: 5' 3" (160 cm)  Weight - Scale: 88 kg (194 lb)  Likely in the setting of covid-19 pneumonia, treatment plan as above

## 2021-12-30 NOTE — DISCHARGE SUMMARY
[facility]  Discharge- Anuel Giang 1960, 57 y o  female MRN: [id number]  Unit/Bed#: S -01 Encounter: [id number]  Primary Care Provider: Gin Adorno   Date and time admitted to hospital: 12/28/2021  3:16 PM    No new Assessment & Plan notes have been filed under this hospital service since the last note was generated  Service: Internal Medicine    Medical Problems             Resolved Problems  Date Reviewed: 12/29/2021          Resolved    SIRS (systemic inflammatory response syndrome) ([facility] [address] ) 12/30/2021     Resolved by  Yonatan Gómez DO    Hypokalemia 12/30/2021     Resolved by  Yonatan Gómez DO              Discharging Resident: Yonatan Gómez DO  Discharging Attending: Ruthie Silva MD  PCP: Gin Adorno  Admission Date:   Admission Orders (From admission, onward)     Ordered        12/28/21 1712  Inpatient Admission  Once                      Discharge Date: 12/30/21    Consultations During Hospital Stay:  · None    Procedures Performed:   · ***    Significant Findings / Test Results:   · ***    Incidental Findings:   · ***     Test Results Pending at Discharge (will require follow up):  · ***     Outpatient Tests Requested:  · ***    Complications:  Steroid induced hyperglycemia    Reason for Admission: fatigue and weakness due to [place] Hospital Course:   Anuel Giang is a 57 y o  female patient who originally presented to the hospital on 12/28/2021 due to generalized weakness, fatigue, chills  In the ED she was found to be tachycardic, with a fever of 102 5, chest x-ray with findings of bilateral pulmonary infiltrates, patient tested positive for COVID-19, and had an MARQUITA likely due to dehydration  She was given IV fluids, azithromycin and placed on oxygen  Antibiotics were discontinued due to negative procalcitonin x2  She was on moderate pathway requiring 3-4L oxygen via nasal cannula   She did have hyperglycemia, likely from steroids, and was placed on basal bolus insulin with coverage with sliding scale  ***    Please see above list of diagnoses and related plan for additional information  Condition at Discharge: {Condition:89238}    Discharge Day Visit / Exam:   Subjective:  ***  Vitals: Blood Pressure: 150/89 (12/30/21 0718)  Pulse: 99 (12/30/21 0718)  Temperature: 97 9 °F (36 6 °C) (12/30/21 0718)  Temp Source: Oral (12/30/21 0718)  Respirations: 17 (12/30/21 0718)  Height: 5' 3" (160 cm) (12/29/21 1345)  Weight - Scale: 88 kg (194 lb) (12/29/21 1345)  SpO2: 97 % (12/30/21 0718)  Exam:   Physical Exam ***    Discussion with Family: {Family Communication:34634}    Discharge instructions/Information to patient and family:   See after visit summary for information provided to patient and family  Provisions for Follow-Up Care:  See after visit summary for information related to follow-up care and any pertinent home health orders  Disposition:   {Disposition on Discharge:51064}    Planned Readmission: ***    Discharge Medications:  See after visit summary for reconciled discharge medications provided to patient and/or family        **Please Note: This note may have been constructed using a voice recognition system**

## 2021-12-30 NOTE — ASSESSMENT & PLAN NOTE
Lab Results   Component Value Date    HGBA1C 8 7 (H) 12/31/2021       Recent Labs     12/30/21  2142 12/31/21  0756 12/31/21  1040 12/31/21  1400   POCGLU 289* 352* 396* 272*       Blood Sugar Average: Last 72 hrs:  (P) 003 [id number]     · On metformin and glimepiride at home  · Requiring insulin during this hospitalization secondary to steroid induced hyperglycemia  Will place patient back home regimen discontinuation of steroids on discharge  · Advised to monitor glucose levels daily and call PCP for glucose levels greater 250  · Diabetic diet   · Her doctor in [place] is [place]

## 2021-12-31 VITALS
TEMPERATURE: 97.8 F | BODY MASS INDEX: 34.38 KG/M2 | DIASTOLIC BLOOD PRESSURE: 91 MMHG | HEIGHT: 63 IN | WEIGHT: 194 LBS | RESPIRATION RATE: 20 BRPM | OXYGEN SATURATION: 97 % | SYSTOLIC BLOOD PRESSURE: 126 MMHG | HEART RATE: 98 BPM

## 2021-12-31 PROBLEM — J96.00 ACUTE RESPIRATORY FAILURE (HCC): Status: ACTIVE | Noted: 2021-12-31

## 2021-12-31 LAB
EST. AVERAGE GLUCOSE BLD GHB EST-MCNC: 203 MG/DL
GLUCOSE SERPL-MCNC: 272 MG/DL (ref 65–140)
GLUCOSE SERPL-MCNC: 296 MG/DL (ref 65–140)
GLUCOSE SERPL-MCNC: 352 MG/DL (ref 65–140)
GLUCOSE SERPL-MCNC: 396 MG/DL (ref 65–140)
HBA1C MFR BLD: 8.7 %

## 2021-12-31 PROCEDURE — 83036 HEMOGLOBIN GLYCOSYLATED A1C: CPT

## 2021-12-31 PROCEDURE — 99238 HOSP IP/OBS DSCHRG MGMT 30/<: CPT | Performed by: INTERNAL MEDICINE

## 2021-12-31 PROCEDURE — 82948 REAGENT STRIP/BLOOD GLUCOSE: CPT

## 2021-12-31 RX ADMIN — INSULIN LISPRO 5 UNITS: 100 INJECTION, SOLUTION INTRAVENOUS; SUBCUTANEOUS at 08:16

## 2021-12-31 RX ADMIN — INSULIN LISPRO 10 UNITS: 100 INJECTION, SOLUTION INTRAVENOUS; SUBCUTANEOUS at 11:31

## 2021-12-31 RX ADMIN — HEPARIN SODIUM 5000 UNITS: 5000 INJECTION INTRAVENOUS; SUBCUTANEOUS at 14:52

## 2021-12-31 RX ADMIN — INSULIN LISPRO 10 UNITS: 100 INJECTION, SOLUTION INTRAVENOUS; SUBCUTANEOUS at 16:18

## 2021-12-31 RX ADMIN — BARICITINIB 2 MG: 2 TABLET, FILM COATED ORAL at 15:34

## 2021-12-31 RX ADMIN — AMLODIPINE BESYLATE 10 MG: 10 TABLET ORAL at 09:14

## 2021-12-31 RX ADMIN — INSULIN LISPRO 4 UNITS: 100 INJECTION, SOLUTION INTRAVENOUS; SUBCUTANEOUS at 08:13

## 2021-12-31 RX ADMIN — HEPARIN SODIUM 5000 UNITS: 5000 INJECTION INTRAVENOUS; SUBCUTANEOUS at 06:26

## 2021-12-31 NOTE — PLAN OF CARE
Problem: Potential for Falls  Goal: Patient will remain free of falls  Description: INTERVENTIONS:  - Educate patient/family on patient safety including physical limitations  - Instruct patient to call for assistance with activity   - Consult OT/PT to assist with strengthening/mobility   - Keep Call bell within reach  - Keep bed low and locked with side rails adjusted as appropriate  - Keep care items and personal belongings within reach  - Initiate and maintain comfort rounds  - Make Fall Risk Sign visible to staff  - Apply yellow socks and bracelet for high fall risk patients  - Consider moving patient to room near nurses station  Outcome: Progressing     Problem: RESPIRATORY - ADULT  Goal: Achieves optimal ventilation and oxygenation  Description: INTERVENTIONS:  - Assess for changes in respiratory status  - Assess for changes in mentation and behavior  - Position to facilitate oxygenation and minimize respiratory effort  - Oxygen administered by appropriate delivery if ordered  - Initiate smoking cessation education as indicated  - Encourage broncho-pulmonary hygiene including cough, deep breathe, Incentive Spirometry  - Assess the need for suctioning and aspirate as needed  - Assess and instruct to report SOB or any respiratory difficulty  - Respiratory Therapy support as indicated  Outcome: Progressing     Problem: METABOLIC, FLUID AND ELECTROLYTES - ADULT  Goal: Electrolytes maintained within normal limits  Description: INTERVENTIONS:  - Monitor labs and assess patient for signs and symptoms of electrolyte imbalances  - Administer electrolyte replacement as ordered  - Monitor response to electrolyte replacements, including repeat lab results as appropriate  - Instruct patient on fluid and nutrition as appropriate  Outcome: Progressing  Goal: Fluid balance maintained  Description: INTERVENTIONS:  - Monitor labs   - Monitor I/O and WT  - Instruct patient on fluid and nutrition as appropriate  - Assess for signs & symptoms of volume excess or deficit  Outcome: Progressing  Goal: Glucose maintained within target range  Description: INTERVENTIONS:  - Monitor Blood Glucose as ordered  - Assess for signs and symptoms of hyperglycemia and hypoglycemia  - Administer ordered medications to maintain glucose within target range  - Assess nutritional intake and initiate nutrition service referral as needed  Outcome: Progressing     Problem: INFECTION - ADULT  Goal: Absence or prevention of progression during hospitalization  Description: INTERVENTIONS:  - Assess and monitor for signs and symptoms of infection  - Monitor lab/diagnostic results  - Monitor all insertion sites, i e  indwelling lines, tubes, and drains  - Monitor endotracheal if appropriate and nasal secretions for changes in amount and color  - [place] appropriate cooling/warming therapies per order  - Administer medications as ordered  - Instruct and encourage patient and family to use good hand hygiene technique  - Identify and instruct in appropriate isolation precautions for identified infection/condition  Outcome: Progressing     Problem: DISCHARGE PLANNING  Goal: Discharge to home or other facility with appropriate resources  Description: INTERVENTIONS:  - Identify barriers to discharge w/patient and caregiver  - Arrange for needed discharge resources and transportation as appropriate  - Identify discharge learning needs (meds, wound care, etc )  - Arrange for interpretive services to assist at discharge as needed  - Refer to Case Management Department for coordinating discharge planning if the patient needs post-hospital services based on physician/advanced practitioner order or complex needs related to functional status, cognitive ability, or social support system  Outcome: Progressing     Problem: Knowledge Deficit  Goal: Patient/family/caregiver demonstrates understanding of disease process, treatment plan, medications, and discharge instructions  Description: Complete learning assessment and assess knowledge base    Interventions:  - Provide teaching at level of understanding  - Provide teaching via preferred learning methods  Outcome: Progressing

## 2021-12-31 NOTE — ASSESSMENT & PLAN NOTE
· Initially requires oxygen, maximum requirements 4 L nasal cannula  · Secondary to COVID-19 pneumonia  · Currently off of oxygen, satting on room air  No oxygen requirements on ambulation    · Resolved

## 2021-12-31 NOTE — DISCHARGE SUMMARY
[facility]  Discharge- Eve Donahue 1960, 57 y o  female MRN: [id number]  Unit/Bed#: S -01 Encounter: [id number]  Primary Care Provider: Radha Cortes   Date and time admitted to hospital: 12/28/2021  3:16 PM    Acute respiratory failure [facility])  Assessment & Plan  · Initially requires oxygen, maximum requirements 4 L nasal cannula  · Secondary to COVID-19 pneumonia  · Currently off of oxygen, satting on room air  No oxygen requirements on ambulation  · Resolved          * Pneumonia due to COVID-19 virus  Assessment & Plan  69-year-old female, PMH DM T2, HTN, history of asthma presents to the ED with 4 days of generalized weakness and productive cough  Reports feeling weak with chills   5 fever, tachycardia, CXR with patchy infiltrates  LA 2 9  Tested positive for COVID-19  Lab Results   Component Value Date    WBC 6 30 12/28/2021    DDIMER 1 25 (H) 12/28/2021    CKTOTAL 443 (H) 12/28/2021    HSTNID2 1 8 12/28/2021    HSTNID4 -2 12/28/2021     Improved  Stable for discharge  Diabetes [facility])  Assessment & Plan  Lab Results   Component Value Date    HGBA1C 8 7 (H) 12/31/2021       Recent Labs     12/30/21  2142 12/31/21  0756 12/31/21  1040 12/31/21  1400   POCGLU 289* 352* 396* 272*       Blood Sugar Average: Last 72 hrs:  (P) 524 [id number]     · On metformin and glimepiride at home  · Requiring insulin during this hospitalization secondary to steroid induced hyperglycemia  Will place patient back home regimen discontinuation of steroids on discharge  · Advised to monitor glucose levels daily and call PCP for glucose levels greater 250  · Diabetic diet   · Her doctor in [place] is KAREN Kelsey  HTN (hypertension)  Assessment & Plan    BP Readings from Last 3 Encounters:   12/31/21 126/91     · Blood pressure reviewed and acceptable    · Continue amlodipine    MARQUITA (acute kidney injury) [facility])  Assessment & Plan  Lab Results   Component Value Date BUN 20 12/29/2021    BUN 22 12/28/2021    CREATININE 1 40 (H) 12/29/2021    CREATININE 1 74 (H) 12/28/2021     · Baseline creatinine is unknown due to no data  · Improved with IV fluid hydration  · Follow-up with PCP, may require ACE-inhibitor for renal function preservation in setting of diabetes  · Avoid nephrotoxic agents    Hypokalemia-resolved as of 12/30/2021  Assessment & Plan  Lab Results   Component Value Date    K 3 8 12/29/2021   Follow-up on am BMP    SIRS (systemic inflammatory response syndrome) (HCC)-resolved as of 12/30/2021  Assessment & Plan  POA tachycardia, fever 102 5, no leukocytosis  XR chest 1 view portable :  12/28/2021  Impression: Suspected bilateral pulmonary infiltrates, left greater than right, suspicious for multifocal pneumonia (possibly Covid-19)  Vitals  Blood Pressure: 150/89  Temperature: 97 9 °F (36 6 °C)  Temp Source: Oral  Pulse: 99  Respirations: 17  SpO2: 97 %  Height: 5' 3" (160 cm)  Weight - Scale: 88 kg (194 lb)  Likely in the setting of covid-19 pneumonia, treatment plan as above  Medical Problems             Resolved Problems  Date Reviewed: 12/29/2021          Resolved    SIRS (systemic inflammatory response syndrome) ([address] ) 12/30/2021     Resolved by  Kaylan Avila DO    Hypokalemia 12/30/2021     Resolved by  Kaylan Avila DO              Discharging Resident: Kenneth Arriola MD  Discharging Attending: Gayle Jose MD  PCP: Radha Cortes  Admission Date:   Admission Orders (From admission, onward)     Ordered        12/28/21 1712  Inpatient Admission  Once                      Discharge Date: 12/31/21    Consultations During Hospital Stay:  · None    Procedures Performed:   · None    Significant Findings / Test Results:   XR chest 1 view portable  Result Date: 12/28/2021  Impression: Suspected bilateral pulmonary infiltrates, left greater than right, suspicious for multifocal pneumonia (possibly Covid-19)  Covid testing pending  Workstation performed: [id number]     As noted above    Incidental Findings:   · None     Test Results Pending at Discharge (will require follow up): · None     Outpatient Tests Requested:  · None    Complications:  None    Reason for Admission:  COVID pneumonia    Hospital Course:   Makenna Jacques is a 57 y o  female patient who originally presented to the hospital on 12/28/2021 due to generalized weakness, fatigue, chills  In the ED she met sepsis criteria with tachycardic, fever of 102 5, chest x-ray with findings of bilateral pulmonary infiltrates and COVID-19 infection  She was treated on the moderate pathway, requiring 3-4L oxygen via nasal cannula  Currently on room air with no oxygen requirements on ambulation  Stable for discharge    Please see above list of diagnoses and related plan for additional information  Condition at Discharge: stable    Discharge Day Visit / Exam:   Subjective:  Patient reports that she feels well  Denies any fever, chills, sweats, shortness of breath or cough  Eating and hydrating well  Vitals: Blood Pressure: 126/91 (12/31/21 0758)  Pulse: 98 (12/31/21 0758)  Temperature: 97 8 °F (36 6 °C) (12/31/21 0758)  Temp Source: Oral (12/31/21 0758)  Respirations: 20 (12/31/21 0758)  Height: 5' 3" (160 cm) (12/29/21 1345)  Weight - Scale: 88 kg (194 lb) (12/29/21 1345)  SpO2: 97 % (12/31/21 1100)  Exam:   Physical Exam  Vitals and nursing note reviewed  Constitutional:       General: She is not in acute distress  Appearance: She is well-developed  She is not ill-appearing or diaphoretic  HENT:      Head: Normocephalic and atraumatic  Eyes:      General: No scleral icterus  Conjunctiva/sclera: Conjunctivae normal    Cardiovascular:      Rate and Rhythm: Normal rate and regular rhythm  Heart sounds: Normal heart sounds  No murmur heard  Pulmonary:      Effort: Pulmonary effort is normal       Breath sounds: Normal breath sounds  No wheezing or rales  Abdominal:      General: Bowel sounds are normal  There is no distension  Palpations: Abdomen is soft  Tenderness: There is no abdominal tenderness  Musculoskeletal:      Right lower leg: No edema  Left lower leg: No edema  Skin:     General: Skin is warm and dry  Neurological:      Mental Status: She is alert and oriented to person, place, and time  Psychiatric:         Speech: Speech normal         Discussion with Family: Attempted to update  (daughter) via phone  Unable to contact  Discharge instructions/Information to patient and family:   See after visit summary for information provided to patient and family  Provisions for Follow-Up Care:  See after visit summary for information related to follow-up care and any pertinent home health orders  Disposition:   Home    Planned Readmission:  None    Discharge Medications:  See after visit summary for reconciled discharge medications provided to patient and/or family        **Please Note: This note may have been constructed using a voice recognition system**

## 2021-12-31 NOTE — DISCHARGE INSTR - AVS FIRST PAGE
Dear Drew Barlow,     It was our pleasure to care for you here at [facility] [place]  It is our hope that we were always able to exceed the expected standards for your care during your stay  You were hospitalized due to COVID pneumonia  You were cared for on the 3rd floor by Florence Brandon MD under the service of Winifred Mckeon MD with the [facility] who covers for your primary care physician (PCP), Martha Rodriguez, while you were hospitalized  If you have any questions or concerns related to this hospitalization, you may contact us at [phone]  For follow up as well as any medication refills, we recommend that you follow up with your primary care physician  A registered nurse will reach out to you by phone within a few days after your discharge to answer any additional questions that you may have after going home  However, at this time we provide for you here, the most important instructions / recommendations at discharge:     Notable Medication Adjustments -   None  Testing Required after Discharge -   BMP in 1 week  Important follow up information -   Please follow-up with PCP for glucose monitoring and further evaluation/management of renal dysfunction  Other Instructions -   Please monitor your blood glucose daily  Please return to the emergency department accordingly primary care provider should you experience worsening shortness of breath  He also called your primary care physician for glucose levels greater than 250  Please review this entire after visit summary as additional general instructions including medication list, appointments, activity, diet, any pertinent wound care, and other additional recommendations from your care team that may be provided for you        Sincerely,     Florence Brandon MD

## 2021-12-31 NOTE — PLAN OF CARE
Problem: Potential for Falls  Goal: Patient will remain free of falls  Description: INTERVENTIONS:  - Educate patient/family on patient safety including physical limitations  - Instruct patient to call for assistance with activity   - Consult OT/PT to assist with strengthening/mobility   - Keep Call bell within reach  - Keep bed low and locked with side rails adjusted as appropriate  - Keep care items and personal belongings within reach  - Initiate and maintain comfort rounds  - Make Fall Risk Sign visible to staff  - Apply yellow socks and bracelet for high fall risk patients  - Consider moving patient to room near nurses station  12/31/2021 1141 by Leeta Boast, RN  Outcome: Progressing  12/30/2021 2349 by Leeta Boast, RN  Outcome: Progressing     Problem: RESPIRATORY - ADULT  Goal: Achieves optimal ventilation and oxygenation  Description: INTERVENTIONS:  - Assess for changes in respiratory status  - Assess for changes in mentation and behavior  - Position to facilitate oxygenation and minimize respiratory effort  - Oxygen administered by appropriate delivery if ordered  - Initiate smoking cessation education as indicated  - Encourage broncho-pulmonary hygiene including cough, deep breathe, Incentive Spirometry  - Assess the need for suctioning and aspirate as needed  - Assess and instruct to report SOB or any respiratory difficulty  - Respiratory Therapy support as indicated  12/31/2021 1141 by Leeta Boast, RN  Outcome: Progressing  12/30/2021 2349 by Leeta Boast, RN  Outcome: Progressing     Problem: METABOLIC, FLUID AND ELECTROLYTES - ADULT  Goal: Electrolytes maintained within normal limits  Description: INTERVENTIONS:  - Monitor labs and assess patient for signs and symptoms of electrolyte imbalances  - Administer electrolyte replacement as ordered  - Monitor response to electrolyte replacements, including repeat lab results as appropriate  - Instruct patient on fluid and nutrition as appropriate  12/31/2021 1141 by Jeffrey Haskins RN  Outcome: Progressing  12/30/2021 2349 by Jeffrey Haskins RN  Outcome: Progressing  Goal: Fluid balance maintained  Description: INTERVENTIONS:  - Monitor labs   - Monitor I/O and WT  - Instruct patient on fluid and nutrition as appropriate  - Assess for signs & symptoms of volume excess or deficit  12/31/2021 1141 by Jeffrey Haskins RN  Outcome: Progressing  12/30/2021 2349 by Jeffrey Haskins RN  Outcome: Progressing  Goal: Glucose maintained within target range  Description: INTERVENTIONS:  - Monitor Blood Glucose as ordered  - Assess for signs and symptoms of hyperglycemia and hypoglycemia  - Administer ordered medications to maintain glucose within target range  - Assess nutritional intake and initiate nutrition service referral as needed  12/31/2021 1141 by Jeffrey Haskins RN  Outcome: Progressing  12/30/2021 2349 by Jeffrey Haskins RN  Outcome: Progressing     Problem: INFECTION - ADULT  Goal: Absence or prevention of progression during hospitalization  Description: INTERVENTIONS:  - Assess and monitor for signs and symptoms of infection  - Monitor lab/diagnostic results  - Monitor all insertion sites, i e  indwelling lines, tubes, and drains  - Monitor endotracheal if appropriate and nasal secretions for changes in amount and color  - [place] appropriate cooling/warming therapies per order  - Administer medications as ordered  - Instruct and encourage patient and family to use good hand hygiene technique  - Identify and instruct in appropriate isolation precautions for identified infection/condition  12/31/2021 1141 by Jeffrey Haskins RN  Outcome: Progressing  12/30/2021 2349 by Jeffrey Haskins RN  Outcome: Progressing     Problem: DISCHARGE PLANNING  Goal: Discharge to home or other facility with appropriate resources  Description: INTERVENTIONS:  - Identify barriers to discharge w/patient and caregiver  - Arrange for needed discharge resources and transportation as appropriate  - Identify discharge learning needs (meds, wound care, etc )  - Arrange for interpretive services to assist at discharge as needed  - Refer to Case Management Department for coordinating discharge planning if the patient needs post-hospital services based on physician/advanced practitioner order or complex needs related to functional status, cognitive ability, or social support system  12/31/2021 1141 by Daljit Stanton RN  Outcome: Progressing  12/30/2021 2349 by Daljit Stanton RN  Outcome: Progressing     Problem: Knowledge Deficit  Goal: Patient/family/caregiver demonstrates understanding of disease process, treatment plan, medications, and discharge instructions  Description: Complete learning assessment and assess knowledge base    Interventions:  - Provide teaching at level of understanding  - Provide teaching via preferred learning methods  12/31/2021 1141 by Daljit Stanton RN  Outcome: Progressing  12/30/2021 2349 by Daljit Stanton RN  Outcome: Progressing

## 2022-01-02 LAB
BACTERIA BLD CULT: NORMAL
BACTERIA BLD CULT: NORMAL

## 2022-01-04 NOTE — UTILIZATION REVIEW
Notification of Discharge   This is a Notification of Discharge from our facility [address]  Please be advised that this patient has been discharge from our facility  Below you will find the admission and discharge date and time including the patients disposition  UTILIZATION REVIEW CONTACT:  Ely Chung  Utilization   Network Utilization Review Department  Phone: [phone] x carefully listen to the prompts  All voicemails are confidential   Email: [email or web address]     PHYSICIAN ADVISORY SERVICES:  FOR [id number]-[id number] REVIEW - MEDICAL NECESSITY DENIAL  Phone: [phone]  Fax: [phone]  Email: [email or web address]     PRESENTATION DATE: 12/28/2021  3:16 PM  OBERVATION ADMISSION DATE:   INPATIENT ADMISSION DATE: 12/28/21  5:12 PM   DISCHARGE DATE: 12/31/2021  5:36 PM  DISPOSITION: Home/Self Care Home/Self Care      IMPORTANT INFORMATION:  Send all requests for admission clinical reviews, approved or denied determinations and any other requests to dedicated fax number below belonging to the campus where the patient is receiving treatment   List of dedicated fax numbers:  [address] DENIALS (Administrative/Medical Necessity) [phone]   [address] (Maternity/NICU/Pediatrics) [phone]   Macie Lopez [phone]   [facility] [phone]   Eloina Vazquez [phone]   Horace Deal [address] [phone]   [facility]  [phone]   [address] [facility] [address]  [address] [facility] [address] [facility] [phone]

## 2022-10-12 PROBLEM — J12.82 PNEUMONIA DUE TO COVID-19 VIRUS: Status: RESOLVED | Noted: 2021-12-28 | Resolved: 2022-10-12

## 2022-10-12 PROBLEM — U07.1 PNEUMONIA DUE TO COVID-19 VIRUS: Status: RESOLVED | Noted: 2021-12-28 | Resolved: 2022-10-12

## 2023-03-15 ENCOUNTER — OFFICE VISIT (OUTPATIENT)
Dept: FAMILY MEDICINE CLINIC | Facility: CLINIC | Age: 63
End: 2023-03-15

## 2023-03-15 VITALS
SYSTOLIC BLOOD PRESSURE: 150 MMHG | BODY MASS INDEX: 34.81 KG/M2 | TEMPERATURE: 97.9 F | HEIGHT: 61 IN | DIASTOLIC BLOOD PRESSURE: 104 MMHG | WEIGHT: 184.38 LBS

## 2023-03-15 DIAGNOSIS — N17.9 AKI (ACUTE KIDNEY INJURY) (HCC): ICD-10-CM

## 2023-03-15 DIAGNOSIS — E11.9 TYPE 2 DIABETES MELLITUS WITHOUT COMPLICATION, WITHOUT LONG-TERM CURRENT USE OF INSULIN (HCC): Primary | ICD-10-CM

## 2023-03-15 DIAGNOSIS — J96.00 ACUTE RESPIRATORY FAILURE, UNSPECIFIED WHETHER WITH HYPOXIA OR HYPERCAPNIA (HCC): ICD-10-CM

## 2023-03-15 DIAGNOSIS — I10 PRIMARY HYPERTENSION: ICD-10-CM

## 2023-03-15 DIAGNOSIS — Z11.4 SCREENING FOR HIV (HUMAN IMMUNODEFICIENCY VIRUS): ICD-10-CM

## 2023-03-15 DIAGNOSIS — Z12.31 ENCOUNTER FOR SCREENING MAMMOGRAM FOR MALIGNANT NEOPLASM OF BREAST: ICD-10-CM

## 2023-03-15 DIAGNOSIS — N18.30 STAGE 3 CHRONIC KIDNEY DISEASE, UNSPECIFIED WHETHER STAGE 3A OR 3B CKD (HCC): ICD-10-CM

## 2023-03-15 DIAGNOSIS — H31.011 MACULAR SCAR OF RIGHT EYE: ICD-10-CM

## 2023-03-15 DIAGNOSIS — Z13.220 SCREENING FOR LIPOID DISORDERS: ICD-10-CM

## 2023-03-15 DIAGNOSIS — Z12.11 COLON CANCER SCREENING: ICD-10-CM

## 2023-03-15 DIAGNOSIS — Z12.4 CERVICAL CANCER SCREENING: ICD-10-CM

## 2023-03-15 DIAGNOSIS — Z11.59 NEED FOR HEPATITIS C SCREENING TEST: ICD-10-CM

## 2023-03-15 LAB
CREAT UR-MCNC: 66.5 MG/DL
LEFT EYE DIABETIC RETINOPATHY: ABNORMAL
LEFT EYE IMAGE QUALITY: ABNORMAL
LEFT EYE MACULAR EDEMA: ABNORMAL
LEFT EYE OTHER RETINOPATHY: ABNORMAL
MICROALBUMIN UR-MCNC: 1140 MG/L (ref 0–20)
MICROALBUMIN/CREAT 24H UR: 1714 MG/G CREATININE (ref 0–30)
RIGHT EYE DIABETIC RETINOPATHY: ABNORMAL
RIGHT EYE IMAGE QUALITY: ABNORMAL
RIGHT EYE MACULAR EDEMA: ABNORMAL
RIGHT EYE OTHER RETINOPATHY: ABNORMAL
SEVERITY (EYE EXAM): ABNORMAL
SL AMB POCT HEMOGLOBIN AIC: 13.2 (ref ?–6.5)

## 2023-03-15 RX ORDER — LISINOPRIL 40 MG/1
40 TABLET ORAL DAILY
Qty: 90 TABLET | Refills: 0 | Status: SHIPPED | OUTPATIENT
Start: 2023-03-15

## 2023-03-15 RX ORDER — GLIMEPIRIDE 4 MG/1
4 TABLET ORAL
Qty: 90 TABLET | Refills: 0 | Status: SHIPPED | OUTPATIENT
Start: 2023-03-15

## 2023-03-15 RX ORDER — LISINOPRIL 40 MG/1
40 TABLET ORAL DAILY
COMMUNITY
End: 2023-03-15 | Stop reason: SDUPTHER

## 2023-03-15 RX ORDER — AMLODIPINE BESYLATE 10 MG/1
10 TABLET ORAL DAILY
Qty: 90 TABLET | Refills: 0 | Status: SHIPPED | OUTPATIENT
Start: 2023-03-15

## 2023-03-15 NOTE — PROGRESS NOTES
Name: Pam Marinelli      : 1960      MRN: [id number]  Encounter Provider: Renny Zepeda MD  Encounter Date: 3/15/2023   Encounter department: [facility] PRIMARY CARE    Assessment & Plan     A1c in office 13 2  Patient states that she only takes metformin and has not taken Amaryl in the past, however chart shows patient was on Amaryl in the past   Declines insulin even though I stressed the importance of the need of insulin at this point  Patient would rather start Amaryl  Amaryl restarted for patient  Refer to endocrinology  Diabetic education as well  Iris exam did show macular scarring  Will refer to ophthalmology for further management  We will check urine microalbumin creatinine ratio  Continue low-carb diet  Blood pressure was elevated today, will restart patient on lisinopril as well as amlodipine as she has not had these for the past few weeks  Continue to check blood pressures at home  Strict ED precautions for any chest pain or blurred vision  Refer to OB/GYN for Pap smear  Refer to GI for colon cancer screening  Mammogram order placed  We will check standard labs  declines pneumococcal vaccine  Avoid nephrotoxins  RTC in 1-3 month for Medicare annual wellness visit  1  Type 2 diabetes mellitus without complication, without long-term current use of insulin (HCC)  -     metFORMIN (GLUCOPHAGE) 1000 MG tablet; Take 1 tablet (1,000 mg total) by mouth 2 (two) times a day with meals  -     POCT hemoglobin A1c  -     Microalbumin / creatinine urine ratio  -     IRIS Diabetic eye exam  -     glimepiride (AMARYL) 4 mg tablet; Take 1 tablet (4 mg total) by mouth daily with breakfast  -     Ambulatory Referral to Endocrinology; Future  -     Ambulatory referral to Diabetic Education - use to refer for diabetes group classes, individual diabetes education, medical nutrition therapy, device training; Future; Expected date: 03/15/2023    2   Primary hypertension  - lisinopril (ZESTRIL) 40 mg tablet; Take 1 tablet (40 mg total) by mouth daily  -     amLODIPine (NORVASC) 10 mg tablet; Take 1 tablet (10 mg total) by mouth daily    3  Cervical cancer screening  -     Ambulatory Referral to Obstetrics / Gynecology; Future    4  Need for hepatitis C screening test  -     Hepatitis C antibody; Future    5  Screening for HIV (human immunodeficiency virus)  -     : HIV 1/2 AB/AG w Reflex [facility] for 2 yr old and above; Future    6  Screening for lipoid disorders  -     Lipid Panel with Direct LDL reflex; Future    7  BMI 34 0-34 9,adult  -     Lipid Panel with Direct LDL reflex; Future    8  MARQUITA (acute kidney injury) ([facility] )  -     Comprehensive metabolic panel; Future    9  Encounter for screening mammogram for malignant neoplasm of breast  -     Mammo screening bilateral w 3d & cad; Future; Expected date: 03/15/2023    10  Colon cancer screening  -     Ambulatory Referral to Gastroenterology; Future    11  Stage 3 chronic kidney disease, unspecified whether stage 3a or 3b CKD ([facility] )    12  Acute respiratory failure, unspecified whether with hypoxia or hypercapnia ([facility] )    13  Macular scar of right eye  -     Ambulatory Referral to Ophthalmology; Future    BMI Counseling: Body mass index is 34 55 kg/m²  The BMI is above normal  Nutrition recommendations include encouraging healthy choices of fruits and vegetables and consuming healthier snacks  Exercise recommendations include moderate physical activity 150 minutes/week  Rationale for BMI follow-up plan is due to patient being overweight or obese  Depression Screening and Follow-up Plan: Patient was screened for depression during today's encounter  They screened negative with a PHQ-2 score of 0  Subjective      She presents today to establish care  She has a past medical history significant for hypertension and diabetes  Reports being out of her blood pressure medication for a few weeks    She has only been taking her metformin, however her chart shows that she is possibly on Amaryl  Denies any new complaints today  Review of Systems   Constitutional: Negative for activity change, appetite change, chills, fatigue and fever  HENT: Negative for congestion, rhinorrhea, sneezing and sore throat  Eyes: Negative for visual disturbance  Respiratory: Negative for cough, chest tightness, shortness of breath and wheezing  Cardiovascular: Negative for chest pain and palpitations  Gastrointestinal: Negative for abdominal distention, abdominal pain, constipation, diarrhea, nausea and vomiting  Musculoskeletal: Negative for arthralgias, back pain, joint swelling and myalgias  Skin: Negative for rash  Neurological: Negative for dizziness, weakness, numbness and headaches  Hematological: Negative for adenopathy  Psychiatric/Behavioral: Negative for dysphoric mood  All other systems reviewed and are negative  No current outpatient medications on file prior to visit  Objective     BP (!) 150/104 (BP Location: Right arm, Patient Position: Sitting, Cuff Size: Standard)   Temp 97 9 °F (36 6 °C)   Ht 5' 1 25" (1 556 m)   Wt 83 6 kg (184 lb 6 oz)   BMI 34 55 kg/m²     Physical Exam  Vitals reviewed  Constitutional:       General: She is not in acute distress  Appearance: Normal appearance  She is well-developed  She is obese  She is not toxic-appearing or diaphoretic  HENT:      Head: Normocephalic and atraumatic  Nose: Nose normal  No congestion or rhinorrhea  Mouth/Throat:      Pharynx: No oropharyngeal exudate or posterior oropharyngeal erythema  Eyes:      General: No scleral icterus  Right eye: No discharge  Left eye: No discharge  Conjunctiva/sclera: Conjunctivae normal    Cardiovascular:      Rate and Rhythm: Normal rate and regular rhythm  Pulses: Pulses are weak  Dorsalis pedis pulses are 1+ on the right side and 1+ on the left side  Posterior tibial pulses are 1+ on the right side and 1+ on the left side  Heart sounds: Normal heart sounds  No murmur heard  Pulmonary:      Effort: Pulmonary effort is normal  No respiratory distress  Breath sounds: Normal breath sounds  No wheezing  Abdominal:      General: There is no distension  Palpations: Abdomen is soft  Tenderness: There is no abdominal tenderness  Musculoskeletal:         General: No tenderness  Normal range of motion  Feet:      Right foot:      Skin integrity: Callus and dry skin present  Left foot:      Skin integrity: Callus and dry skin present  Lymphadenopathy:      Cervical: No cervical adenopathy  Skin:     General: Skin is warm and dry  Coloration: Skin is not pale  Findings: No erythema  Neurological:      Mental Status: She is alert  Psychiatric:         Mood and Affect: Mood normal          Behavior: Behavior normal        Raúl Greene MD     Diabetic Foot Exam    Patient's shoes and socks removed  Right Foot/Ankle   Right Foot Inspection  Skin Exam: dry skin, callus and callus  Sensory   Vibration: intact  Proprioception: intact  Monofilament testing: intact    Vascular  The right DP pulse is 1+  The right PT pulse is 1+  Left Foot/Ankle  Left Foot Inspection  Skin Exam: dry skin and callus  Sensory   Vibration: intact  Proprioception: intact  Monofilament testing: intact    Vascular  The left DP pulse is 1+  The left PT pulse is 1+       Assign Risk Category  No deformity present  No loss of protective sensation  Weak pulses  Risk: 1

## 2023-03-15 NOTE — PATIENT INSTRUCTIONS
Type 2 Diabetes Management for Adults   AMBULATORY CARE:   Type 2 diabetes  is a disease that affects how your body uses glucose (sugar)  Either your body cannot make enough insulin, or it cannot use the insulin correctly  It is important to keep diabetes controlled to prevent damage to your heart, blood vessels, and other organs  Management will help you feel well and enjoy your daily activities  Your diabetes care team providers can help you make a plan to fit diabetes care into your schedule  Your plan can change over time to fit your needs and your family's needs  Have someone call your local emergency number (911 in the [address]) if:   • You cannot be woken  • You have signs of diabetic ketoacidosis:     ? confusion, fatigue    ? vomiting    ? rapid heartbeat    ? fruity smelling breath    ? extreme thirst    ? dry mouth and skin    • You have any of the following signs of a heart attack:      ? Squeezing, pressure, or pain in your chest    ? You may  also have any of the following:     - Discomfort or pain in your back, neck, jaw, stomach, or arm    - Shortness of breath    - Nausea or vomiting    - Lightheadedness or a sudden cold sweat    • You have any of the following signs of a stroke:      ? Numbness or drooping on one side of your face     ? Weakness in an arm or leg    ? Confusion or difficulty speaking    ? Dizziness, a severe headache, or vision loss    Call your doctor or diabetes care team provider if:   • You have a sore or wound that will not heal     • You have a change in the amount you urinate  • Your blood sugar levels are higher than your target goals  • You often have lower blood sugar levels than your target goals  • Your skin is red, dry, warm, or swollen  • You have trouble coping with diabetes, or you feel anxious or depressed  • You have questions or concerns about your condition or care      What you need to know about high blood sugar levels:  High blood sugar levels may not cause any symptoms  You may feel more thirsty or urinate more often than usual  Over time, high blood sugar levels can damage your nerves, blood vessels, tissues, and organs  The following can increase your blood sugar levels:  • Large meals or large amounts of carbohydrates at one time    • Less physical activity    • Stress    • Illness    • A lower dose of diabetes medicine or insulin, or a late dose    What you need to know about low blood sugar levels:  Symptoms include feeling shaky, dizzy, irritable, or confused  You can prevent symptoms by keeping your blood sugar levels from going too low  • Treat a low blood sugar level right away:      ? Drink 4 ounces of juice or have 1 tube of glucose gel  ? Check your blood sugar level again 10 to 15 minutes later  ? When the level goes back to normal, eat a meal or snack to prevent another decrease  • Keep glucose gel, raisins, or hard candy with you at all times to treat a low blood sugar level  • Your blood sugar level can get too low if you take diabetes medicine or insulin and do not eat enough food  • If you use insulin, check your blood sugar level before you exercise  ? If your blood sugar level is below 100 mg/dL, eat 4 crackers or 2 ounces of raisins, or drink 4 ounces of juice  ? Check your level every 30 minutes if you exercise longer than 1 hour  ? You may need a snack during or after exercise  What you can do to manage your blood sugar levels:   • Check your blood sugar levels as directed and as needed  Several items are available to use to check your levels  You may need to check by testing a drop of blood in a glucose monitor  You may instead be given a continuous glucose monitoring (CGM) device  The device is worn at all times  The CGM checks your blood sugar level every 5 minutes  It sends results to an electronic device such as a smart phone  A CGM can be used with or without an insulin pump   You and your diabetes care team providers will decide on the best method for you  The goal for blood sugar levels before meals  is between 80 and 130 mg/dL and 2 hours after eating  is lower than 180 mg/dL  • Make healthy food choices  Work with a dietitian to develop a meal plan that works for you and your schedule  A dietitian can help you learn how to eat the right amount of carbohydrates during your meals and snacks  Carbohydrates can raise your blood sugar level if you eat too many at one time  Examples of foods that contain carbohydrates are breads, cereals, rice, pasta, and sweets  • Eat high-fiber foods as directed  Fiber helps improve blood sugar levels  Fiber also lowers your risk for heart disease and other problems diabetes can cause  Examples of high-fiber foods include vegetables, whole-grain bread, and beans such as ness beans  Your dietitian can tell you how much fiber to have each day  • Get regular physical activity  Physical activity can help you get to your target blood sugar level goal and manage your weight  Get at least 150 minutes of moderate to vigorous aerobic physical activity each week  Do not miss more than 2 days in a row  Do not sit longer than 30 minutes at a time  Your healthcare provider can help you create an activity plan  The plan can include the best activities for you and can help you build your strength and endurance  • Maintain a healthy weight  Ask your team what a healthy weight is for you  A healthy weight can help you control diabetes and prevent heart disease  Ask your team to help you create a weight loss plan, if needed  Weight loss can help make a difference in managing diabetes  Your team will help you set a weight-loss goal, such as 10 to 15 pounds, or 5% of your extra weight  Together you and your team can set manageable weight loss goals  • Take your diabetes medicine or insulin as directed    You may need diabetes medicine, insulin, or both to help control your blood sugar levels  Your healthcare provider will teach you how and when to take your diabetes medicine or insulin  You will also be taught about side effects oral diabetes medicine can cause  Insulin may be injected or given through a pump or pen  You and your providers will decide on the best method for you:    ? An insulin pump  is an implanted device that gives your insulin 24 hours a day  An insulin pump prevents the need for multiple insulin injections in a day  ? An insulin pen  is a device prefilled with the right amount of insulin  ? You and your family members will be taught how to draw up and give insulin  if this is the best method for you  Your providers will also teach you how to dispose of needles and syringes  ? You will learn how much insulin you need  and when to give it  You will be taught when not to give insulin  You will also be taught what to do if your blood sugar level drops too low  This may happen if you take insulin and do not eat the right amount of carbohydrates  More ways to manage type 2 diabetes:   • Wear medical alert identification  Wear medical alert jewelry or carry a card that says you have diabetes  Ask your provider where to get these items  • Do not smoke  Nicotine and other chemicals in cigarettes and cigars can cause lung and blood vessel damage  It also makes it more difficult to manage your diabetes  Ask your provider for information if you currently smoke and need help to quit  Do not use e-cigarettes or smokeless tobacco in place of cigarettes or to help you quit  They still contain nicotine  • Check your feet each day for cuts, scratches, calluses, or other wounds  Look for redness and swelling, and feel for warmth  Wear shoes that fit well  Check your shoes for rocks or other objects that can hurt your feet  Do not walk barefoot or wear shoes without socks   Wear cotton socks to help keep your feet dry  • Ask about vaccines you may need  You have a higher risk for serious illness if you get the flu, pneumonia, COVID-19, or hepatitis  Ask your provider if you should get vaccines to prevent these or other diseases, and when to get the vaccines  • Talk to your provider if you become stressed about diabetes care  Sometimes being able to fit diabetes care into your life can cause increased stress  The stress can cause you not to take care of yourself properly  Your care team providers can help by offering tips about self-care  Your providers may suggest you talk to a mental health provider who can listen and offer help with self-care issues  • Have your A1c checked as directed  Your provider may check your A1c every 3 months, or 2 times each year if your diabetes is controlled  An A1c test shows the average amount of sugar in your blood over the past 2 to 3 months  Your provider will tell you what your A1c level should be  • Have screening tests as directed  Your provider may recommend screening for complications of diabetes and other conditions that may develop  Some screenings may begin right away and some may happen within the first 5 years of diagnosis:    ? Examples of diabetes complications  include kidney problems, high cholesterol, high blood pressure, blood vessel problems, eye problems, and sleep apnea  ? You may be screened for a low vitamin B level  if you take oral diabetes medicine for a long time  ? Women of childbearing years may be screened  for polycystic ovarian syndrome (PCOS)  Follow up with your doctor or diabetes care team providers as directed: You may need to have blood tests done before your follow-up visit  The test results will show if changes need to be made in your treatment or self-care  Talk to your provider if you cannot afford your medicine  Write down your questions so you remember to ask them during your visits    © Copyright [employer] 2022 Information is for End User's use only and may not be sold, redistributed or otherwise used for commercial purposes  The above information is an  only  It is not intended as medical advice for individual conditions or treatments  Talk to your doctor, nurse or pharmacist before following any medical regimen to see if it is safe and effective for you

## 2023-03-16 ENCOUNTER — TELEPHONE (OUTPATIENT)
Dept: ENDOCRINOLOGY | Facility: CLINIC | Age: 63
End: 2023-03-16

## 2023-03-16 NOTE — TELEPHONE ENCOUNTER
Received referral for [employer]  Left voicemail for patient to contact office to schedule Consult/New Patient Appointment

## 2023-03-17 ENCOUNTER — TELEPHONE (OUTPATIENT)
Dept: GASTROENTEROLOGY | Facility: CLINIC | Age: 63
End: 2023-03-17

## 2023-03-17 ENCOUNTER — PREP FOR PROCEDURE (OUTPATIENT)
Dept: GASTROENTEROLOGY | Facility: CLINIC | Age: 63
End: 2023-03-17

## 2023-03-17 DIAGNOSIS — Z12.11 SCREENING FOR COLON CANCER: Primary | ICD-10-CM

## 2023-03-17 NOTE — PROGRESS NOTES
Assessment/Plan   Problem List Items Addressed This Visit    None  Visit Diagnoses     Cervical cancer screening              Discussion  I have discussed the importance of monthly self-breast exams, exercise and healthy diet as well as adequate intake of calcium and vitamin D  Encourage at least 1200 mg calcium citrate + 2000 IUs vitamin D3 divided through diet and supplement throughout the day; Encourage 30-40 min weight bearing exercise most days of week  STI testing - declines    The current ASCCP guidelines were reviewed  Patient's last pap is not in [facility] and therefore, a pap with HPV cotesting is indicated at this time  I emphasized the importance of an annual pelvic and breast exam      Encouraged patient to complete mammogram     Colon cancer screening recommended; PCP ordered  Encouraged to complete  She refused DXA scan at this time  She refused rectal exam at today's visit  Reviewed high BP at this visit  Recommended to repeat at the end of visit but she declined at this time because she reports needing to take her medication  PCP is following/managing her for known HTN  All questions have been answered to her satisfaction  RTO for APE or sooner if needed      Subjective     HPI   Ben Carlson is a 60 y o  female who presents for annual well woman exam      ; Denies  bleeding    No vulvar itch/burn; No vaginal itch/burn; No abn discharge or odor; No urinary sx - burning/pain/frequency/hematuria    (-) SBEs     No abd/pelvic pain or HAs;     +hot flashes/night sweats,vaginal dryness  Pt is not sexually active  She declines sti/hiv/hep testing; Feels safe at home  (+) PCP for routine Bw/care; Last Pap - not in Epic  History of abnormal Pap smear: no   Last mammo - ordered by PCP but not done  History of abnormal mammogram: no   Last colonoscopy - ordered by PCP but not done  Last Dexa scan - n/a    Review of Systems   Constitutional: Negative for fatigue     Eyes: Negative for photophobia and visual disturbance  Respiratory: Negative for cough and shortness of breath  Cardiovascular: Negative for chest pain and palpitations  Gastrointestinal: Negative for abdominal pain, blood in stool, constipation, diarrhea, nausea and rectal pain  Genitourinary: Negative for dyspareunia, dysuria, flank pain, frequency, genital sores, menstrual problem, pelvic pain, urgency, vaginal bleeding, vaginal discharge and vaginal pain  Musculoskeletal: Negative for arthralgias and back pain  Skin: Negative for rash  Neurological: Negative for weakness and headaches  The following portions of the patient's history were reviewed and updated as appropriate: allergies, current medications, past family history, past medical history, past social history, past surgical history and problem list          OB History        5    Para   5    Term   5            AB        Living   5       SAB        IAB        Ectopic        Multiple        Live Births   5                 Past Medical History:   Diagnosis Date   • Asthma    • Diabetes mellitus ([facility] [address] )    • Hypercholesteremia    • Hypertension        History reviewed  No pertinent surgical history  History reviewed  No pertinent family history      Social History     Socioeconomic History   • Marital status: Single     Spouse name: Not on file   • Number of children: Not on file   • Years of education: Not on file   • Highest education level: Not on file   Occupational History   • Not on file   Tobacco Use   • Smoking status: Never   • Smokeless tobacco: Never   Vaping Use   • Vaping Use: Never used   Substance and Sexual Activity   • Alcohol use: Never   • Drug use: Never   • Sexual activity: Not Currently   Other Topics Concern   • Not on file   Social History Narrative   • Not on file     Social Determinants of Health     Financial Resource Strain: Not on file   Food Insecurity: Not on file   Transportation Needs: Not on file   Physical Activity: Not on file   Stress: Not on file   Social Connections: Not on file   Intimate Partner Violence: Not on file   Housing Stability: Not on file         Current Outpatient Medications:   •  amLODIPine (NORVASC) 10 mg tablet, Take 1 tablet (10 mg total) by mouth daily, Disp: 90 tablet, Rfl: 0  •  glimepiride (AMARYL) 4 mg tablet, Take 1 tablet (4 mg total) by mouth daily with breakfast, Disp: 90 tablet, Rfl: 0  •  lisinopril (ZESTRIL) 40 mg tablet, Take 1 tablet (40 mg total) by mouth daily, Disp: 90 tablet, Rfl: 0  •  metFORMIN (GLUCOPHAGE) 1000 MG tablet, Take 1 tablet (1,000 mg total) by mouth 2 (two) times a day with meals, Disp: 180 tablet, Rfl: 0    No Known Allergies    Objective   Vitals:    03/21/23 1422   BP: 155/100   BP Location: Left arm   Patient Position: Sitting   Cuff Size: Standard   Weight: 85 1 kg (187 lb 9 6 oz)   Height: 5' 1 25" (1 556 m)     Physical Exam  Vitals and nursing note reviewed  Constitutional:       Appearance: Normal appearance  She is well-developed and normal weight  HENT:      Head: Normocephalic and atraumatic  Cardiovascular:      Rate and Rhythm: Normal rate and regular rhythm  Heart sounds: Normal heart sounds  Pulmonary:      Effort: Pulmonary effort is normal       Breath sounds: Normal breath sounds  Chest:   Breasts:     Breasts are symmetrical       Right: No inverted nipple, mass, nipple discharge, skin change or tenderness  Left: No inverted nipple, mass, nipple discharge, skin change or tenderness  Abdominal:      General: Abdomen is flat  Bowel sounds are normal       Palpations: Abdomen is soft  Tenderness: There is no right CVA tenderness or left CVA tenderness  Genitourinary:     General: Normal vulva  Exam position: Lithotomy position  Pubic Area: No rash or pubic lice  Labia:         Right: No rash or tenderness  Left: No rash or tenderness  Urethra: No urethral pain  Vagina: Normal  No vaginal discharge  Cervix: Normal       Uterus: Normal        Adnexa: Right adnexa normal and left adnexa normal         Right: No mass  Left: No mass  Rectum: No external hemorrhoid  Comments: Vaginal atrophy noted related to postmenopausal state  Refused rectal exam   Musculoskeletal:         General: Normal range of motion  Cervical back: Normal range of motion  Right lower leg: No edema  Left lower leg: No edema  Lymphadenopathy:      Cervical: No cervical adenopathy  Upper Body:      Right upper body: No supraclavicular or axillary adenopathy  Left upper body: No supraclavicular or axillary adenopathy  Lower Body: No right inguinal adenopathy  No left inguinal adenopathy  Skin:     General: Skin is warm and dry  Neurological:      Mental Status: She is alert and oriented to person, place, and time  Psychiatric:         Mood and Affect: Mood normal          Behavior: Behavior normal          Thought Content: Thought content normal          Judgment: Judgment normal          There are no Patient Instructions on file for this visit

## 2023-03-17 NOTE — TELEPHONE ENCOUNTER
Scheduled date of colonoscopy (as of today): 5/9/2023  Physician performing colonoscopy: Dr Maikel Pace  Location of colonoscopy:  [facility]  Clearances: N/A

## 2023-03-21 ENCOUNTER — OFFICE VISIT (OUTPATIENT)
Dept: OBGYN CLINIC | Facility: CLINIC | Age: 63
End: 2023-03-21

## 2023-03-21 ENCOUNTER — APPOINTMENT (OUTPATIENT)
Dept: LAB | Facility: CLINIC | Age: 63
End: 2023-03-21

## 2023-03-21 VITALS
HEIGHT: 61 IN | BODY MASS INDEX: 35.42 KG/M2 | WEIGHT: 187.6 LBS | SYSTOLIC BLOOD PRESSURE: 155 MMHG | DIASTOLIC BLOOD PRESSURE: 100 MMHG

## 2023-03-21 DIAGNOSIS — Z11.4 SCREENING FOR HIV (HUMAN IMMUNODEFICIENCY VIRUS): ICD-10-CM

## 2023-03-21 DIAGNOSIS — Z11.59 NEED FOR HEPATITIS C SCREENING TEST: ICD-10-CM

## 2023-03-21 DIAGNOSIS — Z01.419 WELL WOMAN EXAM: Primary | ICD-10-CM

## 2023-03-21 DIAGNOSIS — Z13.220 SCREENING FOR LIPOID DISORDERS: ICD-10-CM

## 2023-03-21 DIAGNOSIS — Z12.4 CERVICAL CANCER SCREENING: ICD-10-CM

## 2023-03-21 DIAGNOSIS — N17.9 AKI (ACUTE KIDNEY INJURY) (HCC): ICD-10-CM

## 2023-03-21 LAB
ALBUMIN SERPL BCP-MCNC: 3.4 G/DL (ref 3.5–5)
ALP SERPL-CCNC: 86 U/L (ref 46–116)
ALT SERPL W P-5'-P-CCNC: 13 U/L (ref 12–78)
ANION GAP SERPL CALCULATED.3IONS-SCNC: 5 MMOL/L (ref 4–13)
AST SERPL W P-5'-P-CCNC: 10 U/L (ref 5–45)
BILIRUB SERPL-MCNC: 0.35 MG/DL (ref 0.2–1)
BUN SERPL-MCNC: 24 MG/DL (ref 5–25)
CALCIUM ALBUM COR SERPL-MCNC: 9.6 MG/DL (ref 8.3–10.1)
CALCIUM SERPL-MCNC: 9.1 MG/DL (ref 8.3–10.1)
CHLORIDE SERPL-SCNC: 107 MMOL/L (ref 96–108)
CHOLEST SERPL-MCNC: 174 MG/DL
CO2 SERPL-SCNC: 27 MMOL/L (ref 21–32)
CREAT SERPL-MCNC: 1.22 MG/DL (ref 0.6–1.3)
GFR SERPL CREATININE-BSD FRML MDRD: 47 ML/MIN/1.73SQ M
GLUCOSE P FAST SERPL-MCNC: 85 MG/DL (ref 65–99)
HCV AB SER QL: NORMAL
HDLC SERPL-MCNC: 37 MG/DL
HIV 1+2 AB+HIV1 P24 AG SERPL QL IA: NORMAL
HIV 2 AB SERPL QL IA: NORMAL
HIV1 AB SERPL QL IA: NORMAL
HIV1 P24 AG SERPL QL IA: NORMAL
LDLC SERPL CALC-MCNC: 85 MG/DL (ref 0–100)
POTASSIUM SERPL-SCNC: 3.8 MMOL/L (ref 3.5–5.3)
PROT SERPL-MCNC: 7 G/DL (ref 6.4–8.4)
SODIUM SERPL-SCNC: 139 MMOL/L (ref 135–147)
TRIGL SERPL-MCNC: 261 MG/DL

## 2023-03-23 LAB
HPV HR 12 DNA CVX QL NAA+PROBE: POSITIVE
HPV16 DNA CVX QL NAA+PROBE: NEGATIVE
HPV18 DNA CVX QL NAA+PROBE: NEGATIVE

## 2023-03-27 ENCOUNTER — TELEPHONE (OUTPATIENT)
Dept: GYNECOLOGIC ONCOLOGY | Facility: CLINIC | Age: 63
End: 2023-03-27

## 2023-03-27 DIAGNOSIS — R87.614 CYTOLOGIC EVIDENCE OF MALIGNANCY ON SMEAR OF CERVIX: Primary | ICD-10-CM

## 2023-03-27 LAB
LAB AP GYN PRIMARY INTERPRETATION: ABNORMAL
Lab: ABNORMAL
PATH INTERP SPEC-IMP: ABNORMAL

## 2023-03-28 ENCOUNTER — TELEPHONE (OUTPATIENT)
Dept: GYNECOLOGIC ONCOLOGY | Facility: CLINIC | Age: 63
End: 2023-03-28

## 2023-03-28 ENCOUNTER — TELEPHONE (OUTPATIENT)
Dept: OBGYN CLINIC | Facility: CLINIC | Age: 63
End: 2023-03-28

## 2023-03-28 DIAGNOSIS — C53.9 MALIGNANT NEOPLASM OF CERVIX, UNSPECIFIED SITE (HCC): Primary | ICD-10-CM

## 2023-03-28 NOTE — TELEPHONE ENCOUNTER
Patient's daughter called into the office to reschedule the consult appointment with Dr Chevy Denson due to a conflict with her work schedule   Sreekanth Melo can be reached back @ [phone]

## 2023-03-28 NOTE — TELEPHONE ENCOUNTER
Called patient and lmom regarding pap results  [facility]yn[facility] did get in touch with the patient but wanted to further discuss for scheduling of colposcopy

## 2023-03-30 ENCOUNTER — PATIENT OUTREACH (OUTPATIENT)
Dept: CASE MANAGEMENT | Facility: HOSPITAL | Age: 63
End: 2023-03-30

## 2023-03-30 ENCOUNTER — TELEPHONE (OUTPATIENT)
Dept: OBGYN CLINIC | Facility: CLINIC | Age: 63
End: 2023-03-30

## 2023-03-30 NOTE — TELEPHONE ENCOUNTER
LMOM  Multiple attempts to review pap  Gyn[facility]c is in touch and has already scheduled patient and will have them f/u with her

## 2023-04-03 ENCOUNTER — TELEPHONE (OUTPATIENT)
Dept: GYNECOLOGIC ONCOLOGY | Facility: CLINIC | Age: 63
End: 2023-04-03

## 2023-04-03 ENCOUNTER — CONSULT (OUTPATIENT)
Dept: GYNECOLOGIC ONCOLOGY | Facility: CLINIC | Age: 63
End: 2023-04-03

## 2023-04-03 VITALS
HEART RATE: 103 BPM | TEMPERATURE: 97.4 F | BODY MASS INDEX: 35.12 KG/M2 | DIASTOLIC BLOOD PRESSURE: 82 MMHG | SYSTOLIC BLOOD PRESSURE: 158 MMHG | WEIGHT: 186 LBS | HEIGHT: 61 IN | OXYGEN SATURATION: 99 %

## 2023-04-03 DIAGNOSIS — R87.613 HSIL ON PAP SMEAR OF CERVIX: ICD-10-CM

## 2023-04-03 DIAGNOSIS — C53.1 MALIGNANT NEOPLASM OF EXOCERVIX (HCC): Primary | ICD-10-CM

## 2023-04-03 DIAGNOSIS — Z53.1 BLOOD TRANSFUSION DECLINED BECAUSE PATIENT IS JEHOVAH'S WITNESS: ICD-10-CM

## 2023-04-03 DIAGNOSIS — R87.614 CYTOLOGIC EVIDENCE OF MALIGNANCY ON SMEAR OF CERVIX: ICD-10-CM

## 2023-04-03 PROBLEM — IMO0001 BLOOD TRANSFUSION DECLINED BECAUSE PATIENT IS JEHOVAH'S WITNESS: Status: ACTIVE | Noted: 2023-04-03

## 2023-04-03 NOTE — PATIENT INSTRUCTIONS
Surgical instructions as preoperative packet  Call if you have any questions regarding upcoming surgery

## 2023-04-03 NOTE — ASSESSMENT & PLAN NOTE
With squamous cell carcinoma  No gross lesions  I discussed with patient natural history of this disease process and how excisional procedure would be necessary in order to histologically confirm diagnosis as well as to estimate size/stage to define therapeutic steps  There are no other obvious sources of squamous carcinoma cells in the lower genital tract  Vaginal exam and vulvar exam are normal     I have recommended patient has consented to proceed with loop electrosurgical excisional procedure  Patient declines blood transfusions even if life-threatening hemorrhage occurs as she is a Judaism  I discussed with patient indication, risks, benefits and alternatives of surgical exploration  We discussed possibility of bleeding requiring blood transfusion (which she declined), risk of injuries to vagina, bladder, rectum, etc   Additionally, we discussed general risks associated to stress of surgery such as venous thromboembolism, acute myocardial events and stroke  All questions answered to patient's satisfaction  She agrees and wants to proceed  Informed consent form signed

## 2023-04-03 NOTE — PROGRESS NOTES
Assessment/Plan:    Problem List Items Addressed This Visit        Genitourinary    Malignant neoplasm of exocervix ([facility] [address] ) - Primary     With squamous cell carcinoma  No gross lesions  I discussed with patient natural history of this disease process and how excisional procedure would be necessary in order to histologically confirm diagnosis as well as to estimate size/stage to define therapeutic steps  There are no other obvious sources of squamous carcinoma cells in the lower genital tract  Vaginal exam and vulvar exam are normal     I have recommended patient has consented to proceed with loop electrosurgical excisional procedure  Patient declines blood transfusions even if life-threatening hemorrhage occurs as she is a Pentecostal  I discussed with patient indication, risks, benefits and alternatives of surgical exploration  We discussed possibility of bleeding requiring blood transfusion (which she declined), risk of injuries to vagina, bladder, rectum, etc   Additionally, we discussed general risks associated to stress of surgery such as venous thromboembolism, acute myocardial events and stroke  All questions answered to patient's satisfaction  She agrees and wants to proceed  Informed consent form signed  Relevant Orders    Case request operating room: BIOPSY LEEP CERVIX    CBC and differential    EKG 12 lead       Other    HSIL on Pap smear of cervix    Relevant Orders    Case request operating room: BIOPSY LEEP CERVIX    CBC and differential    EKG 12 lead    Blood transfusion declined because patient is Pentecostal     Patient declines blood transfusions and understands potential risk to life or end organ function          Other Visit Diagnoses     Cytologic evidence of malignancy on smear of cervix        Relevant Orders    Case request operating room: BIOPSY LEEP CERVIX    CBC and differential    EKG 12 lead              CHIEF COMPLAINT:   Here for consultation and management of newly diagnosed squamous cell carcinoma on cervical cytology    Patient ID: Audi Bonds is a 60 y o  female  HPI  58-year-old G5, P5 with 5 prior vaginal deliveries, remote surgical sterilization, hypertension, diabetes and chronic kidney disease  She had minimal gynecologic care since the birth of her last child  Recently went for healthcare maintenance visit and Pap demonstrated incidental high-grade squamous intraepithelial lesion with squamous cell carcinoma  Reportedly there were no gross lesions  Patient is asymptomatic  She denies vaginal bleeding, drainage or discharge  I have reviewed recent laboratory testing, specifically Pap demonstrating high grade squamous intraepithelial lesion and squamous cell carcinoma  Patient is at increased risk of perioperative complications given hypertension, diabetes, etc   We discussed importance of compliance with medications and optimization of glycemic control and blood pressure  Overdue for colon and breast cancer screening  Both of those were reportedly ordered by primary care physician/gynecologist     Review of Systems  As per HPI  Review of systems otherwise noncontributory  Current Outpatient Medications   Medication Sig Dispense Refill   • amLODIPine (NORVASC) 10 mg tablet Take 1 tablet (10 mg total) by mouth daily 90 tablet 0   • glimepiride (AMARYL) 4 mg tablet Take 1 tablet (4 mg total) by mouth daily with breakfast 90 tablet 0   • lisinopril (ZESTRIL) 40 mg tablet Take 1 tablet (40 mg total) by mouth daily 90 tablet 0   • metFORMIN (GLUCOPHAGE) 1000 MG tablet Take 1 tablet (1,000 mg total) by mouth 2 (two) times a day with meals 180 tablet 0     No current facility-administered medications for this visit  No Known Allergies    Past Medical History:   Diagnosis Date   • Asthma    • Diabetes mellitus ([facility] [address] )    • Hypercholesteremia    • Hypertension        No past surgical history on file      OB History        5    Para "  5    Term   5            AB        Living   5       SAB        IAB        Ectopic        Multiple        Live Births   5                 No family history on file  The following portions of the patient's history were reviewed and updated as appropriate: allergies, current medications, past family history, past medical history, past social history, past surgical history and problem list       Objective:    Blood pressure 158/82, pulse 103, temperature (!) 97 4 °F (36 3 °C), temperature source Temporal, height 5' 1 25\" (1 556 m), weight 84 4 kg (186 lb), SpO2 99 %  Body mass index is 34 86 kg/m²  Physical Exam  Vitals reviewed  Exam conducted with a chaperone present  Constitutional:       General: She is not in acute distress  Appearance: Normal appearance  She is not diaphoretic  Eyes:      General: No scleral icterus  Right eye: No discharge  Left eye: No discharge  Conjunctiva/sclera: Conjunctivae normal    Cardiovascular:      Rate and Rhythm: Normal rate and regular rhythm  Heart sounds: Normal heart sounds  No murmur heard  Pulmonary:      Effort: Pulmonary effort is normal  No respiratory distress  Breath sounds: Normal breath sounds  No stridor  No wheezing  Abdominal:      General: There is no distension  Palpations: Abdomen is soft  There is no mass  Tenderness: There is no abdominal tenderness  There is no guarding  Genitourinary:     Comments: Normal external female genitalia  Normal Bartholin's and [place]'s glands  Normal urethral meatus and no evidence of urethral discharge or masses  Bladder without fullness mass or tenderness  Vagina without lesion or discharge  No significant pelvic organ prolapse noted  Cervix is small and grossly normal appearing without visible lesions  Uterus nontender with normal contour, size and mobility  Adnexae without mass or tenderness  Anus without fissure of lesion      Musculoskeletal:      Right lower " leg: No edema  Left lower leg: No edema  Neurological:      Mental Status: She is alert  Component    Case Report   Gynecologic Cytology Report                       Case: [id number]                                   Authorizing Provider: FERN Reich       Collected:           03/21/2023 1528               Ordering Location:     [facility]      Received:            03/21/2023 1528                                      [facility] [place]                                                        First Screen:         Josué Oar, CT                                                          Pathologist:           Bautista Lagos MD                                                            Specimen:    LIQUID-BASED PAP, SCREENING, Endocervical                                                  Primary Interpretation   Epithelial cell abnormality   Electronically signed by Bautista Lagos MD on 3/27/2023 at 12:08 PM   Interpretation Squamous cell carcinoma    Specimen Adequacy Satisfactory for evaluation  (See note)    Note    The specimen shows abundant HGSIL with focal areas consistent with squamous cell carcinoma      No endocervical cells identified  It is difficult to differentiate between squamous metaplastic cells and parabasal cells in atrophic specimens due to numerous causes including menopause, postpartum changes and progestational agents      Intradepartmental consultation in agreement (Agnes Martins)  Tanya Ribera, notified by Dr Julienne Steinberg via [facility] on 3/27/2023 at 12:04pm          Additional Information    [employer] FDA approved ,  and ThinPrep Imaging Duo System are utilized with strict adherence to the 's instruction manual to prepare gynecologic and non-gynecologic cytology specimens for the production of ThinPrep slides as well as for gynecologic ThinPrep imaging   These processes have been validated by our laboratory and/or by the   The Pap test is not a diagnostic procedure and should not be used as the sole means to detect cervical cancer  It is only a screening procedure to aid in the detection of cervical cancer and its precursors  Both false-negative and false-positive results have been experienced  Your patient's test result should be interpreted in this context together with the history and clinical findings    Interpretation performed at [facility], [address] [place], [place], [place]  4/3/2023  11:09 AM

## 2023-04-03 NOTE — TELEPHONE ENCOUNTER
Patients daughter had question and concerns about her mother going under anesthesia and what kind of procedure she is having

## 2023-04-10 PROBLEM — E66.01 OBESITY, MORBID (HCC): Status: ACTIVE | Noted: 2023-04-10

## 2023-04-13 NOTE — PRE-PROCEDURE INSTRUCTIONS
Pre-Surgery Instructions:   Medication Instructions   • amLODIPine (NORVASC) 10 mg tablet Take day of surgery  • glimepiride (AMARYL) 4 mg tablet Hold day of surgery  • lisinopril (ZESTRIL) 40 mg tablet Hold day of surgery  • metFORMIN (GLUCOPHAGE) 1000 MG tablet Hold day of surgery  Pt verbalizes understanding of the following:    - Bathing instructions, will use dial  - No lotions, powders, sprays, deodorant, jewelry, body piercings, false lashes or make-up  - No shaving within 24hrs    - DO NOT EAT OR DRINK ANYTHING after midnight on the evening before your procedure including candy & gum   - ONLY SIPS OF WATER with your medications are allowed on the morning of your procedure  - Avoid OTC non-directed Vit/ Suppl/ Herbals 7 days prior to surgery to ensure no drug interactions with perioperative surgical/ anesthetic meds  - Avoid NSAIDs 3 days prior  - Avoid ASA containing products 5 days prior  - Bring a list of meds you take at home with your last dose noted    - Arrange for someone to drive you home after the procedure & stay with you until the next morning    - Bring insurance cards & photo id    - Bring a case for your glasses   - Bring a container for your dentures  - Leave all valuables such as credit cards, money & jewelry at home    - Notify surgeon if you develop any cold symptoms, change in your health history or develop open wounds     - Did the surgeon's office give you any other special instructions? No  - Did you require any clearances?  No

## 2023-04-21 ENCOUNTER — HOSPITAL ENCOUNTER (OUTPATIENT)
Facility: HOSPITAL | Age: 63
Setting detail: OUTPATIENT SURGERY
Discharge: HOME | End: 2023-04-22
Attending: OBSTETRICS & GYNECOLOGY | Admitting: OBSTETRICS & GYNECOLOGY

## 2023-04-21 DIAGNOSIS — R87.613 HSIL ON PAP SMEAR OF CERVIX: ICD-10-CM

## 2023-04-21 DIAGNOSIS — I10 HYPERTENSION, UNSPECIFIED TYPE: ICD-10-CM

## 2023-04-21 DIAGNOSIS — G89.18 POSTOPERATIVE PAIN: Primary | ICD-10-CM

## 2023-04-21 DIAGNOSIS — C53.1 MALIGNANT NEOPLASM OF EXOCERVIX (HCC): ICD-10-CM

## 2023-04-21 DIAGNOSIS — I49.8 BIGEMINY: ICD-10-CM

## 2023-04-21 DIAGNOSIS — R87.614 CYTOLOGIC EVIDENCE OF MALIGNANCY ON SMEAR OF CERVIX: ICD-10-CM

## 2023-04-21 DIAGNOSIS — I16.0 HYPERTENSIVE URGENCY: ICD-10-CM

## 2023-04-21 PROBLEM — J96.00 ACUTE RESPIRATORY FAILURE (HCC): Status: RESOLVED | Noted: 2021-12-31 | Resolved: 2023-04-21

## 2023-04-21 LAB
ATRIAL RATE: 96 BPM
GLUCOSE SERPL-MCNC: 145 MG/DL (ref 65–140)
GLUCOSE SERPL-MCNC: 238 MG/DL (ref 65–140)
GLUCOSE SERPL-MCNC: 81 MG/DL (ref 65–140)
GLUCOSE SERPL-MCNC: 91 MG/DL (ref 65–140)
P AXIS: 53 DEGREES
PR INTERVAL: 156 MS
QRS AXIS: 62 DEGREES
QRSD INTERVAL: 96 MS
QT INTERVAL: 376 MS
QTC INTERVAL: 475 MS
T WAVE AXIS: 25 DEGREES
VENTRICULAR RATE: 96 BPM

## 2023-04-21 RX ORDER — BUPIVACAINE HYDROCHLORIDE 2.5 MG/ML
INJECTION, SOLUTION EPIDURAL; INFILTRATION; INTRACAUDAL AS NEEDED
Status: DISCONTINUED | OUTPATIENT
Start: 2023-04-21 | End: 2023-04-21 | Stop reason: HOSPADM

## 2023-04-21 RX ORDER — ONDANSETRON 2 MG/ML
4 INJECTION INTRAMUSCULAR; INTRAVENOUS ONCE AS NEEDED
Status: DISCONTINUED | OUTPATIENT
Start: 2023-04-21 | End: 2023-04-21

## 2023-04-21 RX ORDER — ACETAMINOPHEN 325 MG/1
650 TABLET ORAL EVERY 6 HOURS PRN
Status: DISCONTINUED | OUTPATIENT
Start: 2023-04-21 | End: 2023-04-21

## 2023-04-21 RX ORDER — OXYCODONE HYDROCHLORIDE 5 MG/1
5 TABLET ORAL EVERY 6 HOURS PRN
Status: DISCONTINUED | OUTPATIENT
Start: 2023-04-21 | End: 2023-04-22 | Stop reason: HOSPADM

## 2023-04-21 RX ORDER — ONDANSETRON 2 MG/ML
4 INJECTION INTRAMUSCULAR; INTRAVENOUS EVERY 6 HOURS PRN
Status: DISCONTINUED | OUTPATIENT
Start: 2023-04-21 | End: 2023-04-22 | Stop reason: HOSPADM

## 2023-04-21 RX ORDER — DOCUSATE SODIUM 100 MG/1
100 CAPSULE, LIQUID FILLED ORAL 2 TIMES DAILY
Status: DISCONTINUED | OUTPATIENT
Start: 2023-04-21 | End: 2023-04-22 | Stop reason: HOSPADM

## 2023-04-21 RX ORDER — AMLODIPINE BESYLATE 10 MG/1
10 TABLET ORAL DAILY
Status: DISCONTINUED | OUTPATIENT
Start: 2023-04-22 | End: 2023-04-21

## 2023-04-21 RX ORDER — LISINOPRIL 20 MG/1
40 TABLET ORAL DAILY
Status: DISCONTINUED | OUTPATIENT
Start: 2023-04-22 | End: 2023-04-21

## 2023-04-21 RX ORDER — INSULIN LISPRO 100 [IU]/ML
1-5 INJECTION, SOLUTION INTRAVENOUS; SUBCUTANEOUS
Status: DISCONTINUED | OUTPATIENT
Start: 2023-04-21 | End: 2023-04-22 | Stop reason: HOSPADM

## 2023-04-21 RX ORDER — ALBUTEROL SULFATE 2.5 MG/3ML
2.5 SOLUTION RESPIRATORY (INHALATION) ONCE AS NEEDED
Status: DISCONTINUED | OUTPATIENT
Start: 2023-04-21 | End: 2023-04-21

## 2023-04-21 RX ORDER — FENTANYL CITRATE/PF 50 MCG/ML
25 SYRINGE (ML) INJECTION
Status: DISCONTINUED | OUTPATIENT
Start: 2023-04-21 | End: 2023-04-21

## 2023-04-21 RX ORDER — ONDANSETRON 2 MG/ML
4 INJECTION INTRAMUSCULAR; INTRAVENOUS EVERY 6 HOURS PRN
Status: DISCONTINUED | OUTPATIENT
Start: 2023-04-21 | End: 2023-04-21 | Stop reason: SDUPTHER

## 2023-04-21 RX ORDER — LABETALOL HYDROCHLORIDE 5 MG/ML
10 INJECTION, SOLUTION INTRAVENOUS EVERY 6 HOURS PRN
Status: DISCONTINUED | OUTPATIENT
Start: 2023-04-21 | End: 2023-04-22 | Stop reason: HOSPADM

## 2023-04-21 RX ORDER — LISINOPRIL 20 MG/1
40 TABLET ORAL DAILY
Status: DISCONTINUED | OUTPATIENT
Start: 2023-04-21 | End: 2023-04-22 | Stop reason: HOSPADM

## 2023-04-21 RX ORDER — LABETALOL HYDROCHLORIDE 5 MG/ML
10 INJECTION, SOLUTION INTRAVENOUS ONCE
Status: COMPLETED | OUTPATIENT
Start: 2023-04-21 | End: 2023-04-21

## 2023-04-21 RX ORDER — HYDRALAZINE HYDROCHLORIDE 20 MG/ML
10 INJECTION INTRAMUSCULAR; INTRAVENOUS ONCE
Status: COMPLETED | OUTPATIENT
Start: 2023-04-21 | End: 2023-04-21

## 2023-04-21 RX ORDER — ACETAMINOPHEN 325 MG/1
975 TABLET ORAL EVERY 8 HOURS PRN
Refills: 0
Start: 2023-04-21

## 2023-04-21 RX ORDER — SODIUM CHLORIDE, SODIUM LACTATE, POTASSIUM CHLORIDE, CALCIUM CHLORIDE 600; 310; 30; 20 MG/100ML; MG/100ML; MG/100ML; MG/100ML
125 INJECTION, SOLUTION INTRAVENOUS CONTINUOUS
Status: DISCONTINUED | OUTPATIENT
Start: 2023-04-21 | End: 2023-04-21

## 2023-04-21 RX ORDER — ENOXAPARIN SODIUM 100 MG/ML
40 INJECTION SUBCUTANEOUS DAILY
Status: DISCONTINUED | OUTPATIENT
Start: 2023-04-22 | End: 2023-04-22 | Stop reason: HOSPADM

## 2023-04-21 RX ORDER — PROMETHAZINE HYDROCHLORIDE 25 MG/ML
12.5 INJECTION, SOLUTION INTRAMUSCULAR; INTRAVENOUS ONCE AS NEEDED
Status: DISCONTINUED | OUTPATIENT
Start: 2023-04-21 | End: 2023-04-21

## 2023-04-21 RX ORDER — ACETAMINOPHEN 325 MG/1
975 TABLET ORAL EVERY 6 HOURS PRN
Status: DISCONTINUED | OUTPATIENT
Start: 2023-04-21 | End: 2023-04-22 | Stop reason: HOSPADM

## 2023-04-21 RX ORDER — HYDROMORPHONE HCL/PF 1 MG/ML
0.5 SYRINGE (ML) INJECTION
Status: DISCONTINUED | OUTPATIENT
Start: 2023-04-21 | End: 2023-04-21

## 2023-04-21 RX ORDER — AMLODIPINE BESYLATE 10 MG/1
10 TABLET ORAL DAILY
Status: DISCONTINUED | OUTPATIENT
Start: 2023-04-21 | End: 2023-04-22 | Stop reason: HOSPADM

## 2023-04-21 RX ORDER — MAGNESIUM HYDROXIDE 1200 MG/15ML
LIQUID ORAL AS NEEDED
Status: DISCONTINUED | OUTPATIENT
Start: 2023-04-21 | End: 2023-04-21 | Stop reason: HOSPADM

## 2023-04-21 RX ADMIN — SODIUM CHLORIDE, SODIUM LACTATE, POTASSIUM CHLORIDE, AND CALCIUM CHLORIDE 125 ML/HR: .6; .31; .03; .02 INJECTION, SOLUTION INTRAVENOUS at 16:44

## 2023-04-21 RX ADMIN — SODIUM CHLORIDE, SODIUM LACTATE, POTASSIUM CHLORIDE, AND CALCIUM CHLORIDE 125 ML/HR: .6; .31; .03; .02 INJECTION, SOLUTION INTRAVENOUS at 12:29

## 2023-04-21 RX ADMIN — LABETALOL HYDROCHLORIDE 10 MG: 5 INJECTION, SOLUTION INTRAVENOUS at 14:26

## 2023-04-21 RX ADMIN — LISINOPRIL 40 MG: 20 TABLET ORAL at 17:58

## 2023-04-21 RX ADMIN — HYDRALAZINE HYDROCHLORIDE 10 MG: 20 INJECTION, SOLUTION INTRAMUSCULAR; INTRAVENOUS at 12:35

## 2023-04-21 RX ADMIN — METOPROLOL TARTRATE 25 MG: 25 TABLET, FILM COATED ORAL at 20:07

## 2023-04-21 RX ADMIN — AMLODIPINE BESYLATE 10 MG: 10 TABLET ORAL at 17:58

## 2023-04-21 NOTE — ASSESSMENT & PLAN NOTE
Lab Results   Component Value Date    EGFR 47 03/21/2023    EGFR 40 12/29/2021    EGFR 30 12/28/2021    CREATININE 1 22 03/21/2023    CREATININE 1 40 (H) 12/29/2021    CREATININE 1 74 (H) 12/28/2021     History of CKD stage IIIb, BMP in a m

## 2023-04-21 NOTE — H&P
[facility]  H&P  Name: Aayush Jama 60 y o  female I MRN: [id number]  Unit/Bed#: OR POOL I Date of Admission: 4/21/2023   Date of Service: 4/21/2023 I Hospital Day: 0      Assessment/Plan   * Malignant neoplasm of exocervix [facility])  Assessment & Plan  S/p biopsy of LEEP cervix 04/20  Prior Pap smear showing HGSIL  Follow-up with [facility] outpatient as previously scheduled    Hypertensive urgency  Assessment & Plan  Patient did not take her blood pressure medications this morning prior to surgery  Systolic noted to be in 947C despite hydralyzine and labetalol  Continue home meds Norvasc 10 mg, lisinopril 40 mg today  Add Lopressor 25 twice daily    Bigeminy  Assessment & Plan  Patient with episodes of bigeminy noted on telemetry s/p biopsy of loop cervix  Currently asymptomatic denying any palpitations, chest pains or dyspnea  Likely in setting of uncontrolled hypertension, previous 2D echo in 2021 reviewed  Start Lopressor 25 twice daily, monitor on telemetry overnight  Check TSH    Obesity, morbid ([facility] [address] )  Assessment & Plan  Will benefit from weight loss, dietary changes and increased exercise    Stage 3 chronic kidney disease, unspecified whether stage 3a or 3b CKD [facility])  Assessment & Plan  Lab Results   Component Value Date    EGFR 47 03/21/2023    EGFR 40 12/29/2021    EGFR 30 12/28/2021    CREATININE 1 22 03/21/2023    CREATININE 1 40 (H) 12/29/2021    CREATININE 1 74 (H) 12/28/2021     History of CKD stage IIIb, BMP in a m      Diabetes [facility])  Assessment & Plan  Lab Results   Component Value Date    HGBA1C 13 2 (A) 03/15/2023     History of A1c uncontrolled at 13 2  Not on insulin, hold metformin and glimepiride while admitted  Sliding scale, Accu-Cheks  Diabetic diet       VTE Prophylaxis: Enoxaparin (Lovenox)  / sequential compression device   Code Status: FC  POLST: There is no POLST form on file for this patient (pre-hospital)  Discussion with family: patient    Anticipated Length of Stay:  Patient will be admitted on an Observation basis with an anticipated length of stay of 2 midnights  Justification for Hospital Stay: monitor HTN    Total Time for Visit, including Counseling / Coordination of Care: 70 minutes  Greater than 50% of this total time spent on direct patient counseling and coordination of care  Chief Complaint:   Elevated BP    History of Present Illness:    Roscoe Boyle is a 60 y o  female who presents with elective loop cervical biopsy  Past medical history of type 2 diabetes not on insulin, hypertension, obesity, HLD  Initially admitted under outpatient surgery under gynecology oncology  Internal medicine was asked to admit the patient due to uncontrolled blood pressure and noted bigeminy on telemetry  Patient reports not taking her blood pressure medications prior to surgery, had previously been compliant with Norvasc and lisinopril  Prior office visits noted patient's blood pressure runs around 610 systolic  In the PACU, telemetry with episodes of bigeminy  She denies any chest pain, shortness breath, nausea, vomiting, palpitations or dizziness  Review of Systems:    Review of Systems   All other systems reviewed and are negative  Past Medical and Surgical History:     Past Medical History:   Diagnosis Date   • Asthma    • Chronic kidney disease    • COVID 2021    hospitalized   • Diabetes mellitus ([facility] [address] )    • Hypercholesteremia    • Hypertension    • Wears dentures    • Wears glasses        Past Surgical History:   Procedure Laterality Date   • TUBAL LIGATION         Meds/Allergies:    Prior to Admission medications    Medication Sig Start Date End Date Taking?  Authorizing Provider   acetaminophen (TYLENOL) 325 mg tablet Take 3 tablets (975 mg total) by mouth every 8 (eight) hours as needed for mild pain 4/21/23  Yes Rashaun Pedraza MD   amLODIPine (NORVASC) 10 mg tablet Take 1 tablet (10 mg total) by mouth daily 3/15/23  Yes Imer Cornejo MD "  glimepiride (AMARYL) 4 mg tablet Take 1 tablet (4 mg total) by mouth daily with breakfast 3/15/23  Yes Franco Vasquez MD   glucose blood (Contour Next Test) test strip Use to test 2x daily 4/10/23  Yes Arnold Ordonez MD   lisinopril (ZESTRIL) 40 mg tablet Take 1 tablet (40 mg total) by mouth daily 3/15/23  Yes Franco Vasquez MD   metFORMIN (GLUCOPHAGE) 1000 MG tablet Take 1 tablet (1,000 mg total) by mouth 2 (two) times a day with meals 3/15/23  Yes Franco Vasquez MD   Blood Glucose Monitoring Suppl (Contour Next EZ) w/Device KIT Use to test 2x daily 4/10/23   Arnold Ordonez MD   Microlet Lancets MISC Use to test 2x daily 4/10/23   Arnold Ordonez MD     I have reviewed home medications with patient personally      Allergies: No Known Allergies    Social History:     Marital Status: Single   Occupation:   Patient Pre-hospital Living Situation: home  Patient Pre-hospital Level of Mobility: ambulatory  Patient Pre-hospital Diet Restrictions: none  Substance Use History:   Social History     Substance and Sexual Activity   Alcohol Use Never     Social History     Tobacco Use   Smoking Status Never   Smokeless Tobacco Never     Social History     Substance and Sexual Activity   Drug Use Never       Family History:    Family History   Problem Relation Age of Onset   • Diabetes unspecified Mother    • Kidney disease Mother    • No Known Problems Father    • Kidney disease Sister    • Breast cancer Sister    • No Known Problems Sister    • No Known Problems Sister    • No Known Problems Sister    • No Known Problems Sister    • Heart disease Brother    • Diabetes unspecified Brother    • No Known Problems Brother        Physical Exam:     Vitals:   Blood Pressure: (!) 184/88 (04/21/23 1638)  Pulse: 98 (04/21/23 1638)  Temperature: 97 5 °F (36 4 °C) (04/21/23 1545)  Temp Source: Temporal (04/21/23 1353)  Respirations: 20 (04/21/23 1638)  Height: 5' 1 5\" (156 2 cm) (04/21/23 1210)  Weight - Scale: 83 6 kg (184 lb 4 9 " oz) (04/21/23 1210)  SpO2: 96 % (04/21/23 1638)    Physical Exam  Vitals and nursing note reviewed  Constitutional:       Appearance: Normal appearance  She is obese  HENT:      Head: Normocephalic and atraumatic  Eyes:      General: No scleral icterus  Conjunctiva/sclera: Conjunctivae normal    Cardiovascular:      Rate and Rhythm: Normal rate and regular rhythm  Heart sounds: Normal heart sounds  Pulmonary:      Effort: Pulmonary effort is normal  No respiratory distress  Breath sounds: No wheezing or rhonchi  Abdominal:      General: Bowel sounds are normal  There is no distension  Palpations: Abdomen is soft  Musculoskeletal:         General: No swelling  Right lower leg: No edema  Left lower leg: No edema  Skin:     General: Skin is warm and dry  Neurological:      General: No focal deficit present  Mental Status: She is alert  Mental status is at baseline  Psychiatric:         Mood and Affect: Mood normal          Additional Data:     Lab Results: I have personally reviewed pertinent reports  Results from last 7 days   Lab Units 04/21/23  1400 04/21/23  1228   POC GLUCOSE mg/dl 81 91               Imaging: I have personally reviewed pertinent reports  No orders to display       EKG, Pathology, and Other Studies Reviewed on Admission:   · EKG: SR HR 96    Allscripts / Epic Records Reviewed: Yes     ** Please Note: This note has been constructed using a voice recognition system   **

## 2023-04-21 NOTE — ASSESSMENT & PLAN NOTE
S/p biopsy of LEEP cervix 04/20  Prior Pap smear showing HGSIL  Follow-up with [facility] outpatient as previously scheduled

## 2023-04-21 NOTE — ASSESSMENT & PLAN NOTE
Patient with episodes of bigeminy noted on telemetry s/p biopsy of loop cervix  Currently asymptomatic denying any palpitations, chest pains or dyspnea  Likely in setting of uncontrolled hypertension, previous 2D echo in 2021 reviewed  Start Lopressor 25 twice daily, monitor on telemetry overnight  Check TSH

## 2023-04-21 NOTE — ASSESSMENT & PLAN NOTE
Lab Results   Component Value Date    HGBA1C 13 2 (A) 03/15/2023     History of A1c uncontrolled at 13 2  Not on insulin, hold metformin and glimepiride while admitted  Sliding scale, Accu-Cheks  Diabetic diet

## 2023-04-21 NOTE — ASSESSMENT & PLAN NOTE
Patient did not take her blood pressure medications this morning prior to surgery  Systolic noted to be in 043S despite hydralyzine and labetalol  Continue home meds Norvasc 10 mg, lisinopril 40 mg today  Add Lopressor 25 twice daily

## 2023-04-21 NOTE — DISCHARGE INSTRUCTIONS
Loop Electrosurgical Excision Procedure    Discharge Instructions    WHAT YOU NEED TO KNOW:   Today we need a procedure by which we removed a portion of your cervix  We did so with an electrosurgical instrument  There was no evidence of gross tumor or other abnormalities  We will need to wait for your final pathology which will take 7 to 14 days  We will discuss results in the office  DISCHARGE INSTRUCTIONS:     Strict pelvic rest!  You should not place anything inside the vagina, have sexual intercourse for tub baths/sitz bath's, etc  for total of 6 weeks    Contact your doctor at the number above if:   You have a fever over 101o  You have nausea or are vomiting that does not improve after a light meal    Your pain is getting worse, even after you take medicine  You feel pain or burning when you urinate, or you have trouble urinating  You have pus or a foul-smelling odor coming from your vagina and/or wound  Your wound is red, swollen, or draining pus  You see new or an increased amount of bright red blood coming from your vagina or your incisions  You have questions or concerns about your condition or care  Seek care immediately:   Your arm or leg feels warm, tender, and painful  It may look swollen and red  You have increasing abdominal or pelvic pain  You have heavy vaginal bleeding that fills 1 or more sanitary pads in 1 hour  Call 911 for any of the following: You feel lightheaded, short of breath, and have chest pain  You cough up blood  Medicines: You may need any of the following:  Prescription medicine will not be given for this procedure  Resume your previous medications as directed  Extra-strength Tylenol: This medications over-the-counter  Take 2 tablets every 6 hours as needed for mild-to-moderate pain  Ibuprofen/Advil/Motrin 200 mg tablets: This medication is over-the-counter  Take 3 tablets every 8 hours as needed for moderate to severe pain    Take this medication with food  The drink plenty of fluids while using this medication to prevent kidney injury  Metamucil or MiraLax: This product is over-the-counter  Distal 1 large tbsp in a large glass of water  Use once or twice a day for 1-2 weeks prevent and or treat constipation  Take your medicines as directed  Contact your healthcare provider if you think your medicine is not helping or if you have side effects  Tell him or her if you are allergic to any medicine  Keep a list of the medicines, vitamins, and herbs you take  Include the amounts, and when and why you take them  Bring the list or the pill bottles to follow-up visits  Carry your medicine list with you in case of an emergency  Activity:   Rest as needed  Get up and move around as directed to help prevent blood clots  Start with short walks and slowly increase the distance every day  Limit the number of times you climb stairs to 2 times each day for the first week  Plan most of your daily activities on one level of your home  Do not lift objects heavier than 10 pounds until seen in the office for your follow-up appointment  Do not strain during bowel movements  High-fiber foods and extra liquids can help you prevent constipation  Examples of high-fiber foods are fruit and bran  Prune juice and water are good liquids to drink  Do not have sex, use tampons, or douche and do not do tub baths/swimming until cleared by your physician at your postoperative appointment  You may shower as soon as the day after surgery  Do not go into pools or hot tubs until cleared by your doctor  Ask when it is safe for you to drive  It is generally safe to drive as soon as your legs are strong, you are off pain medicines and you feel like you will have the appropriate reflexes to step on the breaks in case of an emergency  Ask when you may return to work and to other regular activities      Wound care: Care for your abdominal incisions as directed  Carefully wash around the wound with soap and water  If you have Hibiclens or medicated soap that you were instructed to use before surgery, you may use that to wash with for up to 2 days after surgery  If not, any mild non-scented, non-abrasive soap is safe  It is okay to let the soap and water run over your incision  Do not scrub your incision  Dry the area and put on new, clean bandages as directed  Change your bandages when they get wet or dirty  If you have strips of medical tape, let them fall off on their own  It may take 7 to 14 days for them to fall off  Check your incision every day for redness, swelling, or pus  Deep breathing: Take deep breaths and cough 10 times each hour  This will decrease your risk for a lung infection  Take a deep breath and hold it for as long as you can  Let the air out and then cough strongly  Deep breaths help open your airway  You may be given an incentive spirometer to help you take deep breaths  Put the plastic piece in your mouth and take a slow, deep breath, then let the air out and cough  Repeat these steps 10 times every hour  Get support: This surgery may be life-changing for you and your family  You will no longer be able to get pregnant  Sudden changes in the levels of your hormones may occur and cause mood swings and depression  You may feel angry, sad, or frightened, or cry frequently and unexpectedly  These feelings are normal  Talk to your healthcare provider about where you can get support  You can also ask if hormone replacement medicine is right for you  Follow up with your healthcare provider or gynecologist as directed: You may need to return to have stitches removed, and for other tests  Write down your questions so you remember to ask them during your visits  © 2017 [employer] Information is for End User's use only and may not be sold, redistributed or otherwise used for commercial purposes   All illustrations and images included in CareNotes® are the copyrighted property of A D A M , Inc  or [employer]  The above information is an  only  It is not intended as medical advice for individual conditions or treatments  Talk to your doctor, nurse or pharmacist before following any medical regimen to see if it is safe and effective for you

## 2023-04-21 NOTE — PLAN OF CARE
Problem: PAIN - ADULT  Goal: Verbalizes/displays adequate comfort level or baseline comfort level  Description: Interventions:  - Encourage patient to monitor pain and request assistance  - Assess pain using appropriate pain scale  - Administer analgesics based on type and severity of pain and evaluate response  - Implement non-pharmacological measures as appropriate and evaluate response  - Consider cultural and social influences on pain and pain management  - Notify physician/advanced practitioner if interventions unsuccessful or patient reports new pain  Outcome: Progressing     Problem: INFECTION - ADULT  Goal: Absence or prevention of progression during hospitalization  Description: INTERVENTIONS:  - Assess and monitor for signs and symptoms of infection  - Monitor lab/diagnostic results  - Monitor all insertion sites, i e  indwelling lines, tubes, and drains  - Monitor endotracheal if appropriate and nasal secretions for changes in amount and color  - [place] appropriate cooling/warming therapies per order  - Administer medications as ordered  - Instruct and encourage patient and family to use good hand hygiene technique  - Identify and instruct in appropriate isolation precautions for identified infection/condition  Outcome: Progressing  Goal: Absence of fever/infection during neutropenic period  Description: INTERVENTIONS:  - Monitor WBC    Outcome: Progressing     Problem: SAFETY ADULT  Goal: Patient will remain free of falls  Description: INTERVENTIONS:  - Educate patient/family on patient safety including physical limitations  - Instruct patient to call for assistance with activity   - Consult OT/PT to assist with strengthening/mobility   - Keep Call bell within reach  - Keep bed low and locked with side rails adjusted as appropriate  - Keep care items and personal belongings within reach  - Initiate and maintain comfort rounds  - Make Fall Risk Sign visible to staff  - Offer Toileting every  Hours, in advance of need  - Initiate/Maintain alarm  - Obtain necessary fall risk management equipment:   - Apply yellow socks and bracelet for high fall risk patients  - Consider moving patient to room near nurses station  Outcome: Progressing  Goal: Maintain or return to baseline ADL function  Description: INTERVENTIONS:  -  Assess patient's ability to carry out ADLs; assess patient's baseline for ADL function and identify physical deficits which impact ability to perform ADLs (bathing, care of mouth/teeth, toileting, grooming, dressing, etc )  - Assess/evaluate cause of self-care deficits   - Assess range of motion  - Assess patient's mobility; develop plan if impaired  - Assess patient's need for assistive devices and provide as appropriate  - Encourage maximum independence but intervene and supervise when necessary  - Involve family in performance of ADLs  - Assess for home care needs following discharge   - Consider OT consult to assist with ADL evaluation and planning for discharge  - Provide patient education as appropriate  Outcome: Progressing  Goal: Maintains/Returns to pre admission functional level  Description: INTERVENTIONS:  - Perform BMAT or MOVE assessment daily    - Set and communicate daily mobility goal to care team and patient/family/caregiver  - Collaborate with rehabilitation services on mobility goals if consulted  - Perform Range of Motion  times a day  - Reposition patient every  hours    - Dangle patient  times a day  - Stand patient  times a day  - Ambulate patient  times a day  - Out of bed to chair  times a day   - Out of bed for meals times a day  - Out of bed for toileting  - Record patient progress and toleration of activity level   Outcome: Progressing     Problem: DISCHARGE PLANNING  Goal: Discharge to home or other facility with appropriate resources  Description: INTERVENTIONS:  - Identify barriers to discharge w/patient and caregiver  - Arrange for needed discharge resources and transportation as appropriate  - Identify discharge learning needs (meds, wound care, etc )  - Arrange for interpretive services to assist at discharge as needed  - Refer to Case Management Department for coordinating discharge planning if the patient needs post-hospital services based on physician/advanced practitioner order or complex needs related to functional status, cognitive ability, or social support system  Outcome: Progressing     Problem: Knowledge Deficit  Goal: Patient/family/caregiver demonstrates understanding of disease process, treatment plan, medications, and discharge instructions  Description: Complete learning assessment and assess knowledge base    Interventions:  - Provide teaching at level of understanding  - Provide teaching via preferred learning methods  Outcome: Progressing

## 2023-04-21 NOTE — OP NOTE
OPERATIVE REPORT  PATIENT NAME: Indra Willis    :  1960  MRN: [id number]  Pt Location: [facility] OR ROOM 08    SURGERY DATE: 2023    Surgeon(s) and Role:     * Madina Chappell MD - Primary     * Fanny Sifuentes MD - Assisting    Preop Diagnosis:  Cytologic evidence of malignancy on smear of cervix [R87 614]  Malignant neoplasm of exocervix ([address] ) [C53 1]  HSIL on Pap smear of cervix [R87 613]    Post-Op Diagnosis Codes:     * Cytologic evidence of malignancy on smear of cervix [R87 614]     * Malignant neoplasm of exocervix ([address] ) [C53 1]     * HSIL on Pap smear of cervix [R87 613]    Procedure(s):  BIOPSY LEEP CERVIX    Specimen(s):  ID Type Source Tests Collected by Time Destination   1 : Ecto cervix Tissue Cervix TISSUE EXAM Madina Chappell MD 2023 1327    2 : Endo cervix Tissue Cervix, Endocervical TISSUE EXAM Madina Chappell MD 2023 1332        Estimated Blood Loss:   Minimal    Anesthesia Type:   MAC + local    Operative Indications:  Cytologic evidence of malignancy on smear of cervix [R87 614]  Malignant neoplasm of exocervix ([address] ) [C53 1]  HSIL on Pap smear of cervix [R87 613]      Operative Findings:  #1  Atrophic but otherwise grossly normal cervix  No evidence of visible or palpable tumor  Complications:   None    Procedure and Technique:  The patient was taken to the operating room for her general anesthesia via LMA was administered without complications  She was placed in dorsal lithotomy position on Hitesh stirrups  Under direct visualization an insulated speculum was placed and excellent exposure of the cervix was obtained  Four cervical quadrants were infiltrated with a total of 12 mL of 0 25% bupivacaine for perioperative analgesia  First, using a 15x12 mm loop electrode with 61 W of pure cutting current, the ectocervix was completely excised in a single pass  An intact not fragmented specimen was obtained and sent to pathology    Then, the endocervical orifice was identified and the endocervix was excised using a 10x10  mm loop electrode with identical electrosurgical settings  The ectocervix was then cauterized circumferentially at the margins of excision and a thin layer of Floseal (Monsel's on national shortage)  was applied for excellent hemostasis  The patient tolerated the procedure well  Sponge, lap, needles and instrument counts were reported as correct ×2  The patient was successfully awakened in the operating room and transferred to the postanesthesia care unit in stable condition  I was present for the entire procedure      Patient Disposition:  PACU         SIGNATURE: Pita Mcdonald MD, Jai Dyer  DATE: April 21, 2023  TIME: 1:48 PM

## 2023-04-22 VITALS
TEMPERATURE: 98.9 F | HEIGHT: 62 IN | RESPIRATION RATE: 20 BRPM | OXYGEN SATURATION: 98 % | SYSTOLIC BLOOD PRESSURE: 140 MMHG | BODY MASS INDEX: 33.55 KG/M2 | DIASTOLIC BLOOD PRESSURE: 80 MMHG | WEIGHT: 182.32 LBS | HEART RATE: 100 BPM

## 2023-04-22 PROBLEM — I49.8 BIGEMINY: Status: RESOLVED | Noted: 2023-04-21 | Resolved: 2023-04-22

## 2023-04-22 LAB
ANION GAP SERPL CALCULATED.3IONS-SCNC: 6 MMOL/L (ref 4–13)
BUN SERPL-MCNC: 23 MG/DL (ref 5–25)
CALCIUM SERPL-MCNC: 9 MG/DL (ref 8.4–10.2)
CHLORIDE SERPL-SCNC: 105 MMOL/L (ref 96–108)
CO2 SERPL-SCNC: 27 MMOL/L (ref 21–32)
CREAT SERPL-MCNC: 1.2 MG/DL (ref 0.6–1.3)
ERYTHROCYTE [DISTWIDTH] IN BLOOD BY AUTOMATED COUNT: 12.8 % (ref 11.6–15.1)
GFR SERPL CREATININE-BSD FRML MDRD: 48 ML/MIN/1.73SQ M
GLUCOSE P FAST SERPL-MCNC: 160 MG/DL (ref 65–99)
GLUCOSE SERPL-MCNC: 160 MG/DL (ref 65–140)
GLUCOSE SERPL-MCNC: 182 MG/DL (ref 65–140)
GLUCOSE SERPL-MCNC: 256 MG/DL (ref 65–140)
HCT VFR BLD AUTO: 37.9 % (ref 34.8–46.1)
HGB BLD-MCNC: 12.5 G/DL (ref 11.5–15.4)
MCH RBC QN AUTO: 28.7 PG (ref 26.8–34.3)
MCHC RBC AUTO-ENTMCNC: 33 G/DL (ref 31.4–37.4)
MCV RBC AUTO: 87 FL (ref 82–98)
PLATELET # BLD AUTO: 414 THOUSANDS/UL (ref 149–390)
PMV BLD AUTO: 8.9 FL (ref 8.9–12.7)
POTASSIUM SERPL-SCNC: 4.1 MMOL/L (ref 3.5–5.3)
RBC # BLD AUTO: 4.35 MILLION/UL (ref 3.81–5.12)
SODIUM SERPL-SCNC: 138 MMOL/L (ref 135–147)
TSH SERPL DL<=0.05 MIU/L-ACNC: 1.69 UIU/ML (ref 0.45–4.5)
WBC # BLD AUTO: 10.73 THOUSAND/UL (ref 4.31–10.16)

## 2023-04-22 RX ADMIN — METOPROLOL TARTRATE 25 MG: 25 TABLET, FILM COATED ORAL at 08:17

## 2023-04-22 RX ADMIN — LISINOPRIL 40 MG: 20 TABLET ORAL at 08:17

## 2023-04-22 RX ADMIN — INSULIN LISPRO 1 UNITS: 100 INJECTION, SOLUTION INTRAVENOUS; SUBCUTANEOUS at 08:17

## 2023-04-22 RX ADMIN — INSULIN LISPRO 2 UNITS: 100 INJECTION, SOLUTION INTRAVENOUS; SUBCUTANEOUS at 12:00

## 2023-04-22 RX ADMIN — ENOXAPARIN SODIUM 40 MG: 100 INJECTION SUBCUTANEOUS at 08:17

## 2023-04-22 RX ADMIN — AMLODIPINE BESYLATE 10 MG: 10 TABLET ORAL at 08:17

## 2023-04-22 NOTE — ASSESSMENT & PLAN NOTE
Likely related to uncontrolled hypertension and emergence from anesthesia  Resolved   outpatient echocardiogram and ambulatory referral to cardiology ordered  will DC on metoprolol 25 mg twice daily

## 2023-04-22 NOTE — ASSESSMENT & PLAN NOTE
Resume outpatient regimen  Follow-up with PCP and endocrinology regarding further treatment  Resume diabetic diet on discharge

## 2023-04-22 NOTE — ASSESSMENT & PLAN NOTE
S/p biopsy of  cervical LEEP 421 by Dr Stacie Kumar with [facility] outpatient as previously scheduled

## 2023-04-22 NOTE — ASSESSMENT & PLAN NOTE
Hypertensive urgency status post elective gynecologic procedure  She also did not take her blood pressure medications prior to surgery  She was treated with hydralazine , labetalol and resumption of her oral meds  She was also started on Lopressor 25 mg twice daily   Resume Norvasc 10 mg daily, lisinopril 40 mg daily    Prescribed Lopressor on discharge

## 2023-04-22 NOTE — DISCHARGE SUMMARY
[address] [facility]  Discharge- Supa Amaral 1960, 60 y o  female MRN: [id number]  Unit/Bed#: E5 -01 Encounter: [id number]  Primary Care Provider: Ken Pérez MD   Date and time admitted to hospital: 4/21/2023 11:46 AM    Hypertensive urgency  Assessment & Plan  Hypertensive urgency status post elective gynecologic procedure  She also did not take her blood pressure medications prior to surgery  She was treated with hydralazine , labetalol and resumption of her oral meds  She was also started on Lopressor 25 mg twice daily   Resume Norvasc 10 mg daily, lisinopril 40 mg daily    Prescribed Lopressor on discharge      Stage 3 chronic kidney disease, unspecified whether stage 3a or 3b CKD ([facility] [address] )  Assessment & Plan  stable    Diabetes [facility])  Assessment & Plan  Resume outpatient regimen  Follow-up with PCP and endocrinology regarding further treatment  Resume diabetic diet on discharge    * Malignant neoplasm of exocervix [facility])  Assessment & Plan  S/p biopsy of  cervical LEEP 421 by Dr Kelley Ojeda with [facility] outpatient as previously scheduled    Bigeminy-resolved as of 4/22/2023  Assessment & Plan  Likely related to uncontrolled hypertension and emergence from anesthesia  Resolved   outpatient echocardiogram and ambulatory referral to cardiology ordered  will DC on metoprolol 25 mg twice daily        Medical Problems     Resolved Problems  Date Reviewed: 4/22/2023          Resolved    Bigeminy 4/22/2023     Resolved by  Wes Price MD        Discharging Physician / Practitioner: Wes Price MD  PCP: Ken Pérez MD  Admission Date:   Admission Orders (From admission, onward)     Ordered        04/21/23 1647  Place in Observation  Once                      Discharge Date: 04/22/23    Consultations During Hospital Stay:  · None    Procedures Performed:   · none    Significant Findings / Test Results:   · PVC and bigeminy  on telemetry "while having hypertensive urgency    Incidental Findings:   · None    Test Results Pending at Discharge (will require follow up):   ·  cervical biopsy results     Outpatient Tests Requested:  · Echocardiogram    Complications:  none    Reason for Admission: High blood pressure    Hospital Course:   Twanda Carrel is a 60 y o  female patient who originally presented to the hospital on 4/21/2023 for an elective gynecologic procedure (LEEP)  After her procedure her blood pressure was uncontrolled as high as  210/88  She also had PVC and bigeminy on telemetry  She admitted not taking her blood pressure regimen prior to her procedure  She also felt anxious and scared the procedure  She was treated with labetalol hydralazine and resumption ofusual lisinopril and amlodipine  She was also started on metoprolol 25 mg twice daily which she tolerated  Was monitored overnight on telemetry with resolution of the bigeminy  She will be discharged on metoprolol 25 mg twice daily  An ambulatory referral to cardiology was placed  An echocardiogram was ordered  Please see above list of diagnoses and related plan for additional information  Condition at Discharge: good    Discharge Day Visit / Exam:   Subjective:  \" I want to go home  \"  She had no chest, palpitations or shortness of breath  We talked about her high blood pressure and PVCs and why we added metoprolol  I told her that she will need a new prescription for metoprolol and she should continue her previous blood pressure medicines  I also told her that she should follow-up with her PCP     Vitals: Blood Pressure: 140/80 (04/22/23 1104)  Pulse: 100 (04/22/23 0814)  Temperature: 98 9 °F (37 2 °C) (04/22/23 0253)  Temp Source: Temporal (04/21/23 1353)  Respirations: 20 (04/22/23 0253)  Height: 5' 1 5\" (156 2 cm) (04/21/23 1210)  Weight - Scale: 82 7 kg (182 lb 5 1 oz) (04/22/23 0552)  SpO2: 98 % (04/22/23 0814)  Exam:   Physical Exam  Vitals reviewed   " Constitutional:       Appearance: She is not ill-appearing  HENT:      Head: Normocephalic and atraumatic  Nose: No congestion or rhinorrhea  Eyes:      General: No scleral icterus  Cardiovascular:      Rate and Rhythm: Normal rate and regular rhythm  Pulmonary:      Breath sounds: No wheezing or rhonchi  Abdominal:      General: There is no distension  Palpations: Abdomen is soft  Tenderness: There is no abdominal tenderness  There is no guarding  Musculoskeletal:      Cervical back: Neck supple  Right lower leg: No edema  Left lower leg: No edema  Skin:     General: Skin is warm and dry  Coloration: Skin is not jaundiced or pale  Neurological:      Mental Status: She is oriented to person, place, and time  Psychiatric:         Mood and Affect: Mood normal          Behavior: Behavior normal         Discussion with Family: Updated  (daughter) via phone  Discharge instructions/Information to patient and family:   See after visit summary for information provided to patient and family  Provisions for Follow-Up Care:  See after visit summary for information related to follow-up care and any pertinent home health orders  Disposition:   Home    Planned Readmission: no     Discharge Statement:  I spent >30 minutes discharging the patient  This time was spent on the day of discharge  I had direct contact with the patient on the day of discharge  Greater than 50% of the total time was spent examining patient, answering all patient questions, arranging and discussing plan of care with patient as well as directly providing post-discharge instructions  Additional time then spent on discharge activities  Discharge Medications:  See after visit summary for reconciled discharge medications provided to patient and/or family        **Please Note: This note may have been constructed using a voice recognition system**

## 2023-04-22 NOTE — PLAN OF CARE
Problem: PAIN - ADULT  Goal: Verbalizes/displays adequate comfort level or baseline comfort level  Description: Interventions:  - Encourage patient to monitor pain and request assistance  - Assess pain using appropriate pain scale  - Administer analgesics based on type and severity of pain and evaluate response  - Implement non-pharmacological measures as appropriate and evaluate response  - Consider cultural and social influences on pain and pain management  - Notify physician/advanced practitioner if interventions unsuccessful or patient reports new pain  Outcome: Progressing     Problem: INFECTION - ADULT  Goal: Absence or prevention of progression during hospitalization  Description: INTERVENTIONS:  - Assess and monitor for signs and symptoms of infection  - Monitor lab/diagnostic results  - Monitor all insertion sites, i e  indwelling lines, tubes, and drains  - Monitor endotracheal if appropriate and nasal secretions for changes in amount and color  - [place] appropriate cooling/warming therapies per order  - Administer medications as ordered  - Instruct and encourage patient and family to use good hand hygiene technique  - Identify and instruct in appropriate isolation precautions for identified infection/condition  Outcome: Progressing     Problem: INFECTION - ADULT  Goal: Absence of fever/infection during neutropenic period  Description: INTERVENTIONS:  - Monitor WBC    Outcome: Progressing     Problem: DISCHARGE PLANNING  Goal: Discharge to home or other facility with appropriate resources  Description: INTERVENTIONS:  - Identify barriers to discharge w/patient and caregiver  - Arrange for needed discharge resources and transportation as appropriate  - Identify discharge learning needs (meds, wound care, etc )  - Arrange for interpretive services to assist at discharge as needed  - Refer to Case Management Department for coordinating discharge planning if the patient needs post-hospital services based on physician/advanced practitioner order or complex needs related to functional status, cognitive ability, or social support system  Outcome: Progressing     Problem: CARDIOVASCULAR - ADULT  Goal: Maintains optimal cardiac output and hemodynamic stability  Description: INTERVENTIONS:  - Monitor I/O, vital signs and rhythm  - Monitor for S/S and trends of decreased cardiac output  - Administer and titrate ordered vasoactive medications to optimize hemodynamic stability  - Assess quality of pulses, skin color and temperature  - Assess for signs of decreased coronary artery perfusion  - Instruct patient to report change in severity of symptoms  Outcome: Progressing     Problem: CARDIOVASCULAR - ADULT  Goal: Absence of cardiac dysrhythmias or at baseline rhythm  Description: INTERVENTIONS:  - Continuous cardiac monitoring, vital signs, obtain 12 lead EKG if ordered  - Administer antiarrhythmic and heart rate control medications as ordered  - Monitor electrolytes and administer replacement therapy as ordered  Outcome: Progressing

## 2023-04-22 NOTE — PLAN OF CARE
Problem: PAIN - ADULT  Goal: Verbalizes/displays adequate comfort level or baseline comfort level  Description: Interventions:  - Encourage patient to monitor pain and request assistance  - Assess pain using appropriate pain scale  - Administer analgesics based on type and severity of pain and evaluate response  - Implement non-pharmacological measures as appropriate and evaluate response  - Consider cultural and social influences on pain and pain management  - Notify physician/advanced practitioner if interventions unsuccessful or patient reports new pain  Outcome: Progressing     Problem: INFECTION - ADULT  Goal: Absence or prevention of progression during hospitalization  Description: INTERVENTIONS:  - Assess and monitor for signs and symptoms of infection  - Monitor lab/diagnostic results  - Monitor all insertion sites, i e  indwelling lines, tubes, and drains  - Monitor endotracheal if appropriate and nasal secretions for changes in amount and color  - [place] appropriate cooling/warming therapies per order  - Administer medications as ordered  - Instruct and encourage patient and family to use good hand hygiene technique  - Identify and instruct in appropriate isolation precautions for identified infection/condition  Outcome: Progressing  Goal: Absence of fever/infection during neutropenic period  Description: INTERVENTIONS:  - Monitor WBC    Outcome: Progressing     Problem: SAFETY ADULT  Goal: Patient will remain free of falls  Description: INTERVENTIONS:  - Educate patient/family on patient safety including physical limitations  - Instruct patient to call for assistance with activity   - Consult OT/PT to assist with strengthening/mobility   - Keep Call bell within reach  - Keep bed low and locked with side rails adjusted as appropriate  - Keep care items and personal belongings within reach  - Initiate and maintain comfort rounds  - Make Fall Risk Sign visible to staff  - Offer Toileting every  Hours, in advance of need  - Initiate/Maintain alarm  - Obtain necessary fall risk management equipment:   - Apply yellow socks and bracelet for high fall risk patients  - Consider moving patient to room near nurses station  Outcome: Progressing  Goal: Maintain or return to baseline ADL function  Description: INTERVENTIONS:  -  Assess patient's ability to carry out ADLs; assess patient's baseline for ADL function and identify physical deficits which impact ability to perform ADLs (bathing, care of mouth/teeth, toileting, grooming, dressing, etc )  - Assess/evaluate cause of self-care deficits   - Assess range of motion  - Assess patient's mobility; develop plan if impaired  - Assess patient's need for assistive devices and provide as appropriate  - Encourage maximum independence but intervene and supervise when necessary  - Involve family in performance of ADLs  - Assess for home care needs following discharge   - Consider OT consult to assist with ADL evaluation and planning for discharge  - Provide patient education as appropriate  Outcome: Progressing  Goal: Maintains/Returns to pre admission functional level  Description: INTERVENTIONS:  - Perform BMAT or MOVE assessment daily    - Set and communicate daily mobility goal to care team and patient/family/caregiver  - Collaborate with rehabilitation services on mobility goals if consulted  - Perform Range of Motion  times a day  - Reposition patient every  hours    - Dangle patient  times a day  - Stand patient  times a day  - Ambulate patient  times a day  - Out of bed to chair  times a day   - Out of bed for meals times a day  - Out of bed for toileting  - Record patient progress and toleration of activity level   Outcome: Progressing     Problem: DISCHARGE PLANNING  Goal: Discharge to home or other facility with appropriate resources  Description: INTERVENTIONS:  - Identify barriers to discharge w/patient and caregiver  - Arrange for needed discharge resources and transportation as appropriate  - Identify discharge learning needs (meds, wound care, etc )  - Arrange for interpretive services to assist at discharge as needed  - Refer to Case Management Department for coordinating discharge planning if the patient needs post-hospital services based on physician/advanced practitioner order or complex needs related to functional status, cognitive ability, or social support system  Outcome: Progressing     Problem: Knowledge Deficit  Goal: Patient/family/caregiver demonstrates understanding of disease process, treatment plan, medications, and discharge instructions  Description: Complete learning assessment and assess knowledge base    Interventions:  - Provide teaching at level of understanding  - Provide teaching via preferred learning methods  Outcome: Progressing     Problem: CARDIOVASCULAR - ADULT  Goal: Maintains optimal cardiac output and hemodynamic stability  Description: INTERVENTIONS:  - Monitor I/O, vital signs and rhythm  - Monitor for S/S and trends of decreased cardiac output  - Administer and titrate ordered vasoactive medications to optimize hemodynamic stability  - Assess quality of pulses, skin color and temperature  - Assess for signs of decreased coronary artery perfusion  - Instruct patient to report change in severity of symptoms  Outcome: Progressing  Goal: Absence of cardiac dysrhythmias or at baseline rhythm  Description: INTERVENTIONS:  - Continuous cardiac monitoring, vital signs, obtain 12 lead EKG if ordered  - Administer antiarrhythmic and heart rate control medications as ordered  - Monitor electrolytes and administer replacement therapy as ordered  Outcome: Progressing

## 2023-04-26 ENCOUNTER — TELEPHONE (OUTPATIENT)
Dept: GYNECOLOGIC ONCOLOGY | Facility: CLINIC | Age: 63
End: 2023-04-26

## 2023-04-26 NOTE — TELEPHONE ENCOUNTER
Left patient message to reschedule appt with Dr Margaret Monroy 5/5 in [address] [facility] to a different time, same day

## 2023-04-28 ENCOUNTER — DOCUMENTATION (OUTPATIENT)
Dept: HEMATOLOGY ONCOLOGY | Facility: CLINIC | Age: 63
End: 2023-04-28

## 2023-04-28 NOTE — PROGRESS NOTES
In-basket message received from Dr Browne to add patient to GYN oncology [facility] on 5/1/2023  Chart reviewed and prep completed

## 2023-05-01 ENCOUNTER — DOCUMENTATION (OUTPATIENT)
Dept: GYNECOLOGIC ONCOLOGY | Facility: CLINIC | Age: 63
End: 2023-05-01

## 2023-05-01 NOTE — PROGRESS NOTES
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     Patient was discussed at the Multidisciplinary Gynecologic Oncology Case review on 5/1/2023   The group recommended to consider treatment with laparoscopic hysterectomy if medically feasible  Follow-up appointment with Dr Stanley Going on 5/5/2023  NCCN guidelines were available for review  The final treatment plan will be left to the discretion of the patient and the treating physician  DISCLAIMERS:    TO THE TREATING PHYSICIAN:  This conference is a meeting of clinicians from various specialty areas who evaluate and discuss patients for whom a multidisciplinary treatment approach is being considered  Please note that the above opinion was a consensus of the conference attendees and is intended only to assist in quality care of the discussed patient  The responsibility for follow up on the input given during the conference, along with any final decisions regarding plan of care, is that of the patient and the patient's provider  Accordingly, appointments have only been recommended based on this information and have NOT been scheduled unless otherwise noted  TO THE PATIENT:  This summary is a brief record of major aspects of your cancer treatment  You may choose to share a copy with any of your doctors or nurses  However, this is not a detailed or comprehensive record of your care

## 2023-05-05 ENCOUNTER — OFFICE VISIT (OUTPATIENT)
Dept: GYNECOLOGIC ONCOLOGY | Facility: CLINIC | Age: 63
End: 2023-05-05

## 2023-05-05 VITALS
HEIGHT: 62 IN | DIASTOLIC BLOOD PRESSURE: 74 MMHG | WEIGHT: 188 LBS | OXYGEN SATURATION: 100 % | HEART RATE: 92 BPM | BODY MASS INDEX: 34.6 KG/M2 | SYSTOLIC BLOOD PRESSURE: 138 MMHG

## 2023-05-05 DIAGNOSIS — R87.613 HSIL ON PAP SMEAR OF CERVIX: ICD-10-CM

## 2023-05-05 DIAGNOSIS — C53.1 MALIGNANT NEOPLASM OF EXOCERVIX (HCC): Primary | ICD-10-CM

## 2023-05-05 NOTE — ASSESSMENT & PLAN NOTE
Patient underwent loop electrosurgical excisional procedure in April 2023 after Pap had demonstrated squamous cell carcinoma cells  Her cervix was grossly normal   LEEP procedure demonstrated MARILU-3 with questionable positivity at margins but no evidence of squamous cell carcinoma  Patient's case was discussed at multidisciplinary tumor conference as it is relatively atypical   Her Pap demonstrated squamous cell carcinoma cells  Gross examination is normal and now there is discrepancy between Pap and excisional procedure  Our group recommended to proceed with definitive diagnostic and therapeutic hysterectomy  Today I counseled patient about our recommendation to proceed with robotic total hysterectomy with or without bilateral salpingo-oophorectomy  I explained to patient rationale to provide this recommendation  I went as far as to illustrate site of origin of cytologic assessment as well as specific tissues excised during her LEEP procedure  Unfortunately, patient does not feel ready to proceed with that recommendation  I explained risk of not proceeding includes but are not limited to the presence of an undiagnosed malignancy and potential occult progression  She will consider her options  She will return to the office in 4 weeks for cervical check  I have encouraged her to come to the office with family members so I can again emphasized my strong recommendation to proceed with surgery

## 2023-05-05 NOTE — PROGRESS NOTES
Assessment/Plan:    Problem List Items Addressed This Visit        Genitourinary    Malignant neoplasm of exocervix [facility]) - Primary     Patient underwent loop electrosurgical excisional procedure in April 2023 after Pap had demonstrated squamous cell carcinoma cells  Her cervix was grossly normal   LEEP procedure demonstrated MARILU-3 with questionable positivity at margins but no evidence of squamous cell carcinoma  Patient's case was discussed at multidisciplinary tumor conference as it is relatively atypical   Her Pap demonstrated squamous cell carcinoma cells  Gross examination is normal and now there is discrepancy between Pap and excisional procedure  Our group recommended to proceed with definitive diagnostic and therapeutic hysterectomy  Today I counseled patient about our recommendation to proceed with robotic total hysterectomy with or without bilateral salpingo-oophorectomy  I explained to patient rationale to provide this recommendation  I went as far as to illustrate site of origin of cytologic assessment as well as specific tissues excised during her LEEP procedure  Unfortunately, patient does not feel ready to proceed with that recommendation  I explained risk of not proceeding includes but are not limited to the presence of an undiagnosed malignancy and potential occult progression  She will consider her options  She will return to the office in 4 weeks for cervical check  I have encouraged her to come to the office with family members so I can again emphasized my strong recommendation to proceed with surgery  Other    HSIL on Pap smear of cervix     I spent approximately 15 minutes in the care of this patient  The entire visit was devoted to face-to-face counseling and discussing tumor board recommendation, illustrating normal and abnormal anatomy as well as findings from recent procedures and samples  All questions were answered to patient's satisfaction      CHIEF COMPLAINT:  Follow-up after LEEP     Patient ID: Holley Morris is a 60 y o  female  HPI  Patient is 2 weeks out after loop electrosurgical excision procedure performed for Pap which demonstrated squamous cell carcinoma cells  Final pathology specimen demonstrated MARILU-3 and no evidence of squamous of carcinoma on LEEP tissue  Patient stayed overnight to control abnormal blood sugars  Presents for follow-up  Reports minimal spotting  Denies vaginal drainage or leakage  Denies pelvic or abdominal pain  Normal bowel and bladder function  The following portions of the patient's history were reviewed and updated as appropriate: allergies, current medications, past family history, past medical history, past social history, past surgical history and problem list     Review of Systems  As above  Review of systems is otherwise unremarkable      Current Outpatient Medications:     acetaminophen (TYLENOL) 325 mg tablet, Take 3 tablets (975 mg total) by mouth every 8 (eight) hours as needed for mild pain, Disp: , Rfl: 0    amLODIPine (NORVASC) 10 mg tablet, Take 1 tablet (10 mg total) by mouth daily, Disp: 90 tablet, Rfl: 0    Blood Glucose Monitoring Suppl (Contour Next EZ) w/Device KIT, Use to test 2x daily, Disp: 1 kit, Rfl: 1    glimepiride (AMARYL) 4 mg tablet, Take 1 tablet (4 mg total) by mouth daily with breakfast, Disp: 90 tablet, Rfl: 0    glucose blood (Contour Next Test) test strip, Use to test 2x daily, Disp: 100 strip, Rfl: 3    lisinopril (ZESTRIL) 40 mg tablet, Take 1 tablet (40 mg total) by mouth daily, Disp: 90 tablet, Rfl: 0    metFORMIN (GLUCOPHAGE) 1000 MG tablet, Take 1 tablet (1,000 mg total) by mouth 2 (two) times a day with meals, Disp: 180 tablet, Rfl: 0    metoprolol tartrate (LOPRESSOR) 25 mg tablet, Take 1 tablet (25 mg total) by mouth every 12 (twelve) hours, Disp: 60 tablet, Rfl: 0    Microlet Lancets MISC, Use to test 2x daily, Disp: 100 each, Rfl: 3    Objective:    Blood "pressure 138/74, pulse 92, height 5' 1 5\" (1 562 m), weight 85 3 kg (188 lb), SpO2 100 %  Body mass index is 34 95 kg/m²  Body surface area is 1 85 meters squared  Physical Exam  Deferred  Case Report   Surgical Pathology Report                         Case: [id number]                                    Authorizing Provider: Estrada Renee MD      Collected:           04/21/2023 1327               Ordering Location:     [facility]        Received:            04/21/2023 [facility]                                      [facility]                                                      Pathologist:           Bairon Simeon DO                                                      Specimens:   A) - Cervix, Ecto cervix                                                                             B) - Cervix, Endo cervix                                                                   Final Diagnosis   A  Uterus, Cervix (Ectocervix), LEEP:  - High-grade squamous intraepithelial lesion (HSIL/MARILU III), involving endocervical glands  See Note  - Additional levels were evaluated      B  Uterus, Cervix, LEEP (Endocervix):  - Endocervical stroma with reactive changes, negative for dysplasia  Electronically signed by Bairon Simeon DO on 4/27/2023 at  9:09 AM   Note    Note A: HSIL is multifocally present at the endocervical and ectocervical resection margins; as the specimen is not oriented, it is not possible to discern quadrants involved by HSIL  However, as Part B represents additional endocervical tissue, this may be considered final endocervical margin if appropriate     Additional Information    All reported additional testing was performed with appropriately reactive controls   These tests were developed and their performance characteristics determined by [address] [place] [address] or appropriate performing facility, though some tests may be performed on tissues which have not " "been validated for performance characteristics (such as staining performed on alcohol exposed cell blocks and decalcified tissues)   Results should be interpreted with caution and in the context of the patients clinical condition  These tests may not be cleared or approved by the [place]  Food and Drug Administration, though the FDA has determined that such clearance or approval is not necessary  These tests are used for clinical purposes and they should not be regarded as investigational or for research  This laboratory has been approved by [place] [address], designated as a high-complexity laboratory and is qualified to perform these tests         Interpretation performed at [place] [address]    Gross Description    A  The specimen is received in formalin, labeled with the patient's name and hospital number, and is designated \" ectocervix\"  The specimen consists of 1 unoriented white tan-pink soft rubbery portion of tissue smooth on one side which measures 1 2 x 1 2 x 1 0 cm  The specimen exhibits a patent oval-shaped os measuring 0 5 x 0 2 cm  The margin of resection is inked blue, and the area of os is inked yellow  The specimen is cut into quadrants and radially sectioned revealing white-tan pink dense cut surfaces  Entirely submitted  Four cassettes, with 1 unoriented quadrant sequentially submitted in each cassette, with tissue in embedding bags in cassettes A2, A4      B  The specimen is received in formalin, labeled with the patient's name and hospital number, and is designated \" endocervix\"  The specimen consists of 2 partially ragged white-tan pink unoriented soft rubbery portions of tissue measuring 1 5 x 1 5 x 0 5 cm, and 1 4 x 0 7 x 0 2 cm  The portions of tissue are inked blue  The largest portion of tissue is sectioned revealing white tan-pink dense cut surfaces  Entirely submitted   Three cassettes with largest portion of tissue sequentially submitted in cassettes B1, " B2, with tissue in an embedding bag in cassette B1      Note: The estimated total formalin fixation time based upon information provided by the submitting clinician and the standard processing schedule is over 72 hours    Kian Browne MD, [place], [place]  5/5/2023  4:03 PM

## 2023-05-05 NOTE — PATIENT INSTRUCTIONS
Return to the office in 4 weeks for cervical exam   Call if you have any questions  We strongly recommend you proceed with definitive surgery by hysterectomy as discussed  We will continue to discuss at your next visit

## 2023-05-30 ENCOUNTER — TELEPHONE (OUTPATIENT)
Dept: HEMATOLOGY ONCOLOGY | Facility: CLINIC | Age: 63
End: 2023-05-30

## 2023-06-07 DIAGNOSIS — E11.9 TYPE 2 DIABETES MELLITUS WITHOUT COMPLICATION, WITHOUT LONG-TERM CURRENT USE OF INSULIN (HCC): ICD-10-CM

## 2023-06-07 DIAGNOSIS — I10 PRIMARY HYPERTENSION: ICD-10-CM

## 2023-06-07 RX ORDER — GLIMEPIRIDE 4 MG/1
TABLET ORAL
Qty: 90 TABLET | Refills: 0 | Status: SHIPPED | OUTPATIENT
Start: 2023-06-07

## 2023-06-07 RX ORDER — LISINOPRIL 40 MG/1
TABLET ORAL
Qty: 90 TABLET | Refills: 0 | Status: SHIPPED | OUTPATIENT
Start: 2023-06-07

## 2023-06-20 ENCOUNTER — OFFICE VISIT (OUTPATIENT)
Dept: FAMILY MEDICINE CLINIC | Facility: CLINIC | Age: 63
End: 2023-06-20
Payer: COMMERCIAL

## 2023-06-20 VITALS
SYSTOLIC BLOOD PRESSURE: 162 MMHG | HEART RATE: 92 BPM | BODY MASS INDEX: 33.49 KG/M2 | WEIGHT: 189 LBS | OXYGEN SATURATION: 97 % | HEIGHT: 63 IN | DIASTOLIC BLOOD PRESSURE: 82 MMHG | TEMPERATURE: 97.1 F

## 2023-06-20 DIAGNOSIS — E08.319 DIABETIC RETINOPATHY ASSOCIATED WITH DIABETES MELLITUS DUE TO UNDERLYING CONDITION, MACULAR EDEMA PRESENCE UNSPECIFIED, UNSPECIFIED LATERALITY, UNSPECIFIED RETINOPATHY SEVERITY (HCC): ICD-10-CM

## 2023-06-20 DIAGNOSIS — E11.9 TYPE 2 DIABETES MELLITUS WITHOUT COMPLICATION, WITHOUT LONG-TERM CURRENT USE OF INSULIN (HCC): ICD-10-CM

## 2023-06-20 DIAGNOSIS — I10 PRIMARY HYPERTENSION: Primary | ICD-10-CM

## 2023-06-20 DIAGNOSIS — N18.30 STAGE 3 CHRONIC KIDNEY DISEASE, UNSPECIFIED WHETHER STAGE 3A OR 3B CKD (HCC): ICD-10-CM

## 2023-06-20 PROCEDURE — 99214 OFFICE O/P EST MOD 30 MIN: CPT | Performed by: FAMILY MEDICINE

## 2023-06-20 RX ORDER — PERPHENAZINE 16 MG/1
TABLET, FILM COATED ORAL
Qty: 100 STRIP | Refills: 3 | Status: SHIPPED | OUTPATIENT
Start: 2023-06-20

## 2023-06-20 RX ORDER — AMLODIPINE BESYLATE 10 MG/1
10 TABLET ORAL DAILY
Qty: 90 TABLET | Refills: 0 | Status: SHIPPED | OUTPATIENT
Start: 2023-06-20

## 2023-06-20 NOTE — PROGRESS NOTES
Chief Complaint   Patient presents with   • Follow-up     Name: Nacho Winn      : 1960      MRN: [id number]  Encounter Provider: Sruya Wallace MD  Encounter Date: 2023   Encounter department: [address] [place] [address]     Blood pressure elevated today 162/82, recommend patient to take her blood pressure medications daily  Amlodipine refill sent today  Avoid nephrotoxins due to stage III chronic kidney disease  Follow-up with endocrinology for her diabetes and continue Amaryl as well as metformin  Refer to diabetic education again  Glucose test trips sent to pharmacy  I did reprint patient's ophthalmology referral as her iris exam did reveal diabetic retinopathy  Advised patient to schedule appointment soon as possible  Continue to work on diet and exercise  RTC in 3 months for follow-up  1  Primary hypertension  -     amLODIPine (NORVASC) 10 mg tablet; Take 1 tablet (10 mg total) by mouth daily    2  Stage 3 chronic kidney disease, unspecified whether stage 3a or 3b CKD ([address] )    3  Type 2 diabetes mellitus without complication, without long-term current use of insulin ([facility])  -     Ambulatory referral to Diabetic Education - use to refer for diabetes group classes, individual diabetes education, medical nutrition therapy, device training; Future; Expected date: 2023  -     glucose blood (Contour Next Test) test strip; Use to test 2x daily    4  Diabetic retinopathy associated with diabetes mellitus due to underlying condition, macular edema presence unspecified, unspecified laterality, unspecified retinopathy severity ([address] )    5  BMI 33 0-33 9,adult           Subjective      She presents today for 3-month follow-up  States that she has been following up with endocrinology for her diabetes  Has not followed up with ophthalmology at due to diabetic retinopathy  Did not take her medications this morning      Review of Systems   Constitutional: "Negative for activity change, appetite change, chills, fatigue and fever  HENT: Negative for congestion, rhinorrhea, sneezing and sore throat  Eyes: Negative for pain, discharge, redness and itching  Respiratory: Negative for cough, chest tightness, shortness of breath and wheezing  Cardiovascular: Negative for chest pain and palpitations  Gastrointestinal: Negative for abdominal distention, abdominal pain, constipation, diarrhea, nausea and vomiting  Musculoskeletal: Negative for arthralgias, back pain, joint swelling and myalgias  Skin: Negative for rash  Neurological: Negative for dizziness, weakness, numbness and headaches  Hematological: Negative for adenopathy  Psychiatric/Behavioral: Negative for dysphoric mood  All other systems reviewed and are negative        Current Outpatient Medications on File Prior to Visit   Medication Sig   • Blood Glucose Monitoring Suppl (Contour Next EZ) w/Device KIT Use to test 2x daily   • glimepiride (AMARYL) 4 mg tablet TAKE 1 TABLET BY MOUTH DAILY WITH BREAKFAST   • lisinopril (ZESTRIL) 40 mg tablet TAKE 1 TABLET BY MOUTH EVERY DAY   • metFORMIN (GLUCOPHAGE) 1000 MG tablet TAKE 1 TABLET BY MOUTH TWICE A DAY WITH MEALS   • Microlet Lancets MISC Use to test 2x daily   • [DISCONTINUED] amLODIPine (NORVASC) 10 mg tablet Take 1 tablet (10 mg total) by mouth daily   • [DISCONTINUED] glucose blood (Contour Next Test) test strip Use to test 2x daily   • acetaminophen (TYLENOL) 325 mg tablet Take 3 tablets (975 mg total) by mouth every 8 (eight) hours as needed for mild pain (Patient not taking: Reported on 6/20/2023)   • metoprolol tartrate (LOPRESSOR) 25 mg tablet Take 1 tablet (25 mg total) by mouth every 12 (twelve) hours (Patient not taking: Reported on 6/20/2023)       Objective     /82 (BP Location: Left arm, Patient Position: Sitting, Cuff Size: Adult)   Pulse 92   Temp (!) 97 1 °F (36 2 °C) (Temporal)   Ht 5' 3 19\" (1 605 m)   Wt 85 7 kg (189 " lb)   LMP  (LMP Unknown)   SpO2 97%   BMI 33 28 kg/m²     Physical Exam  Vitals reviewed  Constitutional:       General: She is not in acute distress  Appearance: Normal appearance  She is well-developed  She is obese  She is not toxic-appearing or diaphoretic  HENT:      Head: Normocephalic and atraumatic  Right Ear: External ear normal       Left Ear: External ear normal       Nose: Nose normal       Mouth/Throat:      Mouth: Mucous membranes are moist    Eyes:      General: No scleral icterus  Right eye: No discharge  Left eye: No discharge  Conjunctiva/sclera: Conjunctivae normal    Cardiovascular:      Rate and Rhythm: Normal rate and regular rhythm  Pulses: Normal pulses  Heart sounds: Normal heart sounds  No murmur heard  Pulmonary:      Effort: Pulmonary effort is normal  No respiratory distress  Breath sounds: Normal breath sounds  No wheezing  Abdominal:      General: There is no distension  Palpations: Abdomen is soft  Tenderness: There is no abdominal tenderness  Musculoskeletal:         General: No tenderness  Normal range of motion  Cervical back: Normal range of motion  Skin:     General: Skin is warm and dry  Capillary Refill: Capillary refill takes less than 2 seconds  Coloration: Skin is not pale  Findings: No erythema  Neurological:      General: No focal deficit present  Mental Status: She is alert     Psychiatric:         Mood and Affect: Mood normal          Behavior: Behavior normal        Surya Wallace MD

## 2023-09-07 DIAGNOSIS — I10 PRIMARY HYPERTENSION: ICD-10-CM

## 2023-09-07 DIAGNOSIS — E11.9 TYPE 2 DIABETES MELLITUS WITHOUT COMPLICATION, WITHOUT LONG-TERM CURRENT USE OF INSULIN (HCC): ICD-10-CM

## 2023-09-07 RX ORDER — LISINOPRIL 40 MG/1
TABLET ORAL
Qty: 90 TABLET | Refills: 0 | Status: SHIPPED | OUTPATIENT
Start: 2023-09-07

## 2023-09-18 ENCOUNTER — OFFICE VISIT (OUTPATIENT)
Dept: FAMILY MEDICINE CLINIC | Facility: CLINIC | Age: 63
End: 2023-09-18
Payer: COMMERCIAL

## 2023-09-18 VITALS
OXYGEN SATURATION: 98 % | HEART RATE: 76 BPM | DIASTOLIC BLOOD PRESSURE: 82 MMHG | HEIGHT: 63 IN | SYSTOLIC BLOOD PRESSURE: 136 MMHG | BODY MASS INDEX: 34.38 KG/M2 | WEIGHT: 194 LBS | TEMPERATURE: 98 F

## 2023-09-18 DIAGNOSIS — I10 PRIMARY HYPERTENSION: ICD-10-CM

## 2023-09-18 DIAGNOSIS — N18.30 STAGE 3 CHRONIC KIDNEY DISEASE, UNSPECIFIED WHETHER STAGE 3A OR 3B CKD (HCC): ICD-10-CM

## 2023-09-18 DIAGNOSIS — E08.319 DIABETIC RETINOPATHY ASSOCIATED WITH DIABETES MELLITUS DUE TO UNDERLYING CONDITION, MACULAR EDEMA PRESENCE UNSPECIFIED, UNSPECIFIED LATERALITY, UNSPECIFIED RETINOPATHY SEVERITY (HCC): ICD-10-CM

## 2023-09-18 DIAGNOSIS — E11.9 TYPE 2 DIABETES MELLITUS WITHOUT COMPLICATION, WITHOUT LONG-TERM CURRENT USE OF INSULIN (HCC): Primary | ICD-10-CM

## 2023-09-18 LAB — SL AMB POCT HEMOGLOBIN AIC: 6.6 (ref ?–6.5)

## 2023-09-18 PROCEDURE — 83036 HEMOGLOBIN GLYCOSYLATED A1C: CPT | Performed by: FAMILY MEDICINE

## 2023-09-18 PROCEDURE — 99214 OFFICE O/P EST MOD 30 MIN: CPT | Performed by: FAMILY MEDICINE

## 2023-09-18 RX ORDER — LISINOPRIL 40 MG/1
40 TABLET ORAL DAILY
Qty: 90 TABLET | Refills: 0 | Status: SHIPPED | OUTPATIENT
Start: 2023-09-18

## 2023-09-18 RX ORDER — AMLODIPINE BESYLATE 10 MG/1
10 TABLET ORAL DAILY
Qty: 90 TABLET | Refills: 0 | Status: SHIPPED | OUTPATIENT
Start: 2023-09-18

## 2023-09-18 RX ORDER — GLIMEPIRIDE 4 MG/1
4 TABLET ORAL
Qty: 90 TABLET | Refills: 0 | Status: SHIPPED | OUTPATIENT
Start: 2023-09-18

## 2023-09-18 NOTE — PROGRESS NOTES
Chief Complaint   Patient presents with   • Hypertension   • Diabetes     Refill meds 90 day     Name: Gregg Zimmer      : 1960      MRN: [id number]  Encounter Provider: Mora Hernandez MD  Encounter Date: 2023   Encounter department: [facility] PR[facility]    Assessment & Plan     HbA1c in office 6.6, down from 13.2. Patient should continue her medication regimen, decrease carbohydrates in diet, and work on exercise. She is doing very well with this. She will see ophthalmology soon. Still needs to have mammogram and colonoscopy done, orders were given at previous visit. Avoid nephrotoxins due to stage III chronic kidney disease. RTC in 6 months for follow-up    1. Type 2 diabetes mellitus without complication, without long-term current use of insulin ([facility])  -     POCT hemoglobin A1c  -     glimepiride (AMARYL) 4 mg tablet; Take 1 tablet (4 mg total) by mouth daily with breakfast    2. Primary hypertension  -     lisinopril (ZESTRIL) 40 mg tablet; Take 1 tablet (40 mg total) by mouth daily  -     amLODIPine (NORVASC) 10 mg tablet; Take 1 tablet (10 mg total) by mouth daily    3. Diabetic retinopathy associated with diabetes mellitus due to underlying condition, macular edema presence unspecified, unspecified laterality, unspecified retinopathy severity ([address])    4. Stage 3 chronic kidney disease, unspecified whether stage 3a or 3b CKD ([address])           Subjective      She presents today for follow-up on hypertension and diabetes. She saw endocrinology recently. Overall she is doing well and compliant with her medications. Denies any new complaints today. Review of Systems   Constitutional: Negative for activity change, appetite change, chills, fatigue and fever. HENT: Negative for congestion, rhinorrhea, sneezing and sore throat. Eyes: Negative for pain, discharge, redness and itching. Respiratory: Negative for cough, chest tightness, shortness of breath and wheezing. Cardiovascular: Negative for chest pain and palpitations. Gastrointestinal: Negative for abdominal distention, abdominal pain, constipation, diarrhea, nausea and vomiting. Musculoskeletal: Negative for arthralgias, back pain, joint swelling and myalgias. Skin: Negative for rash. Neurological: Negative for dizziness, weakness, numbness and headaches. Hematological: Negative for adenopathy. Psychiatric/Behavioral: Negative for dysphoric mood. The patient is not nervous/anxious. All other systems reviewed and are negative. Current Outpatient Medications on File Prior to Visit   Medication Sig   • Blood Glucose Monitoring Suppl (Contour Next EZ) w/Device KIT Use to test 2x daily   • glucose blood (Contour Next Test) test strip Use to test 2x daily   • metFORMIN (GLUCOPHAGE) 1000 MG tablet TAKE 1 TABLET BY MOUTH TWICE A DAY WITH FOOD   • Microlet Lancets MISC Use to test 2x daily   • [DISCONTINUED] amLODIPine (NORVASC) 10 mg tablet Take 1 tablet (10 mg total) by mouth daily   • [DISCONTINUED] glimepiride (AMARYL) 4 mg tablet TAKE 1 TABLET BY MOUTH DAILY WITH BREAKFAST   • [DISCONTINUED] lisinopril (ZESTRIL) 40 mg tablet TAKE 1 TABLET BY MOUTH EVERY DAY   • acetaminophen (TYLENOL) 325 mg tablet Take 3 tablets (975 mg total) by mouth every 8 (eight) hours as needed for mild pain (Patient not taking: Reported on 6/20/2023)   • metoprolol tartrate (LOPRESSOR) 25 mg tablet Take 1 tablet (25 mg total) by mouth every 12 (twelve) hours (Patient not taking: Reported on 6/20/2023)       Objective     /82   Pulse 76   Temp 98 °F (36.7 °C)   Ht 5' 3.19" (1.605 m)   Wt 88 kg (194 lb)   LMP  (LMP Unknown)   SpO2 98%   BMI 34.16 kg/m²     Physical Exam  Vitals reviewed. Constitutional:       General: She is not in acute distress. Appearance: Normal appearance. She is well-developed. She is not toxic-appearing or diaphoretic. HENT:      Head: Normocephalic and atraumatic.       Right Ear: External ear normal.      Left Ear: External ear normal.   Eyes:      General: No scleral icterus. Right eye: No discharge. Left eye: No discharge. Conjunctiva/sclera: Conjunctivae normal.   Cardiovascular:      Rate and Rhythm: Normal rate and regular rhythm. Pulses: Normal pulses. Heart sounds: Normal heart sounds. No murmur heard. Pulmonary:      Effort: Pulmonary effort is normal. No respiratory distress. Breath sounds: Normal breath sounds. No wheezing. Abdominal:      General: There is no distension. Palpations: Abdomen is soft. There is no mass. Tenderness: There is no abdominal tenderness. Hernia: No hernia is present. Musculoskeletal:         General: No tenderness. Normal range of motion. Cervical back: Normal range of motion. Skin:     General: Skin is warm and dry. Capillary Refill: Capillary refill takes less than 2 seconds. Coloration: Skin is not pale. Findings: No erythema. Neurological:      General: No focal deficit present. Mental Status: She is alert.    Psychiatric:         Mood and Affect: Mood normal.         Behavior: Behavior normal.       Jason Whiting MD

## 2023-09-18 NOTE — PATIENT INSTRUCTIONS
Type 2 Diabetes Management for Adults   AMBULATORY CARE:   Type 2 diabetes  is a disease that affects how your body uses glucose (sugar). Either your body cannot make enough insulin, or it cannot use the insulin correctly. It is important to keep diabetes controlled to prevent damage to your heart, blood vessels, and other organs. Management will help you feel well and enjoy your daily activities. Your diabetes care team providers can help you make a plan to fit diabetes care into your schedule. Your plan can change over time to fit your needs and your family's needs. Have someone call your local emergency number (911 in the [address]) if:   • You cannot be woken. • You have signs of diabetic ketoacidosis:     ? confusion, fatigue    ? vomiting    ? rapid heartbeat    ? fruity smelling breath    ? extreme thirst    ? dry mouth and skin    • You have any of the following signs of a heart attack:      ? Squeezing, pressure, or pain in your chest    ? You may  also have any of the following:     - Discomfort or pain in your back, neck, jaw, stomach, or arm    - Shortness of breath    - Nausea or vomiting    - Lightheadedness or a sudden cold sweat    • You have any of the following signs of a stroke:      ? Numbness or drooping on one side of your face     ? Weakness in an arm or leg    ? Confusion or difficulty speaking    ? Dizziness, a severe headache, or vision loss    Call your doctor or diabetes care team provider if:   • You have a sore or wound that will not heal.    • You have a change in the amount you urinate. • Your blood sugar levels are higher than your target goals. • You often have lower blood sugar levels than your target goals. • Your skin is red, dry, warm, or swollen. • You have trouble coping with diabetes, or you feel anxious or depressed. • You have questions or concerns about your condition or care.     What you need to know about high blood sugar levels:  High blood sugar levels may not cause any symptoms. You may feel more thirsty or urinate more often than usual. Over time, high blood sugar levels can damage your nerves, blood vessels, tissues, and organs. The following can increase your blood sugar levels:  • Large meals or large amounts of carbohydrates at one time    • Less physical activity    • Stress    • Illness    • A lower dose of diabetes medicine or insulin, or a late dose    What you need to know about low blood sugar levels:  Symptoms include feeling shaky, dizzy, irritable, or confused. You can prevent symptoms by keeping your blood sugar levels from going too low. • Treat a low blood sugar level right away:      ? Drink 4 ounces of juice or have 1 tube of glucose gel. ? Check your blood sugar level again 10 to 15 minutes later. ? When the level goes back to normal, eat a meal or snack to prevent another decrease. • Keep glucose gel, raisins, or hard candy with you at all times to treat a low blood sugar level. • Your blood sugar level can get too low if you take diabetes medicine or insulin and do not eat enough food. • If you use insulin, check your blood sugar level before you exercise. ? If your blood sugar level is below 100 mg/dL, eat 4 crackers or 2 ounces of raisins, or drink 4 ounces of juice. ? Check your level every 30 minutes if you exercise longer than 1 hour. ? You may need a snack during or after exercise. What you can do to manage your blood sugar levels:   • Check your blood sugar levels as directed and as needed. Several items are available to use to check your levels. You may need to check by testing a drop of blood in a glucose monitor. You may instead be given a continuous glucose monitoring (CGM) device. The device is worn at all times. The CGM checks your blood sugar level every 5 minutes. It sends results to an electronic device such as a smart phone. A CGM can be used with or without an insulin pump.  You and your diabetes care team providers will decide on the best method for you. The goal for blood sugar levels before meals  is between 80 and 130 mg/dL and 2 hours after eating  is lower than 180 mg/dL. • Make healthy food choices. Work with a dietitian to develop a meal plan that works for you and your schedule. A dietitian can help you learn how to eat the right amount of carbohydrates during your meals and snacks. Carbohydrates can raise your blood sugar level if you eat too many at one time. Examples of foods that contain carbohydrates are breads, cereals, rice, pasta, and sweets. • Eat high-fiber foods as directed. Fiber helps improve blood sugar levels. Fiber also lowers your risk for heart disease and other problems diabetes can cause. Examples of high-fiber foods include vegetables, whole-grain bread, and beans such as ness beans. Your dietitian can tell you how much fiber to have each day. • Get regular physical activity. Physical activity can help you get to your target blood sugar level goal and manage your weight. Get at least 150 minutes of moderate to vigorous aerobic physical activity each week. Do not miss more than 2 days in a row. Do not sit longer than 30 minutes at a time. Your healthcare provider can help you create an activity plan. The plan can include the best activities for you and can help you build your strength and endurance. • Maintain a healthy weight. Ask your team what a healthy weight is for you. A healthy weight can help you control diabetes and prevent heart disease. Ask your team to help you create a weight loss plan, if needed. Weight loss can help make a difference in managing diabetes. Your team will help you set a weight-loss goal, such as 10 to 15 pounds, or 5% of your extra weight. Together you and your team can set manageable weight loss goals. • Take your diabetes medicine or insulin as directed.   You may need diabetes medicine, insulin, or both to help control your blood sugar levels. Your healthcare provider will teach you how and when to take your diabetes medicine or insulin. You will also be taught about side effects oral diabetes medicine can cause. Insulin may be injected or given through a pump or pen. You and your providers will decide on the best method for you:    ? An insulin pump  is an implanted device that gives your insulin 24 hours a day. An insulin pump prevents the need for multiple insulin injections in a day. ? An insulin pen  is a device prefilled with the right amount of insulin. ? You and your family members will be taught how to draw up and give insulin  if this is the best method for you. Your providers will also teach you how to dispose of needles and syringes. ? You will learn how much insulin you need  and when to give it. You will be taught when not to give insulin. You will also be taught what to do if your blood sugar level drops too low. This may happen if you take insulin and do not eat the right amount of carbohydrates. More ways to manage type 2 diabetes:   • Wear medical alert identification. Wear medical alert jewelry or carry a card that says you have diabetes. Ask your provider where to get these items. • Do not smoke. Nicotine and other chemicals in cigarettes and cigars can cause lung and blood vessel damage. It also makes it more difficult to manage your diabetes. Ask your provider for information if you currently smoke and need help to quit. Do not use e-cigarettes or smokeless tobacco in place of cigarettes or to help you quit. They still contain nicotine. • Check your feet each day for cuts, scratches, calluses, or other wounds. Look for redness and swelling, and feel for warmth. Wear shoes that fit well. Check your shoes for rocks or other objects that can hurt your feet. Do not walk barefoot or wear shoes without socks.  Wear cotton socks to help keep your feet dry. • Ask about vaccines you may need. You have a higher risk for serious illness if you get the flu, pneumonia, COVID-19, or hepatitis. Ask your provider if you should get vaccines to prevent these or other diseases, and when to get the vaccines. • Talk to your provider if you become stressed about diabetes care. Sometimes being able to fit diabetes care into your life can cause increased stress. The stress can cause you not to take care of yourself properly. Your care team providers can help by offering tips about self-care. Your providers may suggest you talk to a mental health provider who can listen and offer help with self-care issues. • Have your A1c checked as directed. Your provider may check your A1c every 3 months, or 2 times each year if your diabetes is controlled. An A1c test shows the average amount of sugar in your blood over the past 2 to 3 months. Your provider will tell you what your A1c level should be. • Have screening tests as directed. Your provider may recommend screening for complications of diabetes and other conditions that may develop. Some screenings may begin right away and some may happen within the first 5 years of diagnosis:    ? Examples of diabetes complications  include kidney problems, high cholesterol, high blood pressure, blood vessel problems, eye problems, and sleep apnea. ? You may be screened for a low vitamin B level  if you take oral diabetes medicine for a long time. ? Women of childbearing years may be screened  for polycystic ovarian syndrome (PCOS). Follow up with your doctor or diabetes care team providers as directed: You may need to have blood tests done before your follow-up visit. The test results will show if changes need to be made in your treatment or self-care. Talk to your provider if you cannot afford your medicine. Write down your questions so you remember to ask them during your visits.   © Copyright [employer] 2022 Information is for End User's use only and may not be sold, redistributed or otherwise used for commercial purposes. The above information is an  only. It is not intended as medical advice for individual conditions or treatments. Talk to your doctor, nurse or pharmacist before following any medical regimen to see if it is safe and effective for you.

## 2023-12-09 DIAGNOSIS — E11.9 TYPE 2 DIABETES MELLITUS WITHOUT COMPLICATION, WITHOUT LONG-TERM CURRENT USE OF INSULIN (HCC): ICD-10-CM

## 2024-01-14 DIAGNOSIS — E11.9 TYPE 2 DIABETES MELLITUS WITHOUT COMPLICATION, WITHOUT LONG-TERM CURRENT USE OF INSULIN (HCC): ICD-10-CM

## 2024-01-15 RX ORDER — PERPHENAZINE 16 MG/1
TABLET, FILM COATED ORAL
Qty: 100 STRIP | Refills: 3 | Status: SHIPPED | OUTPATIENT
Start: 2024-01-15

## 2024-01-16 DIAGNOSIS — E11.9 TYPE 2 DIABETES MELLITUS WITHOUT COMPLICATION, WITHOUT LONG-TERM CURRENT USE OF INSULIN (HCC): ICD-10-CM

## 2024-03-09 ENCOUNTER — TELEPHONE (OUTPATIENT)
Dept: OTHER | Facility: OTHER | Age: 64
End: 2024-03-09

## 2024-03-11 DIAGNOSIS — I10 PRIMARY HYPERTENSION: ICD-10-CM

## 2024-03-11 DIAGNOSIS — E11.9 TYPE 2 DIABETES MELLITUS WITHOUT COMPLICATION, WITHOUT LONG-TERM CURRENT USE OF INSULIN (HCC): ICD-10-CM

## 2024-03-11 RX ORDER — GLIMEPIRIDE 4 MG/1
4 TABLET ORAL
Qty: 90 TABLET | Refills: 0 | Status: SHIPPED | OUTPATIENT
Start: 2024-03-11

## 2024-03-11 RX ORDER — LISINOPRIL 40 MG/1
40 TABLET ORAL DAILY
Qty: 90 TABLET | Refills: 0 | Status: SHIPPED | OUTPATIENT
Start: 2024-03-11

## 2024-03-11 RX ORDER — AMLODIPINE BESYLATE 10 MG/1
10 TABLET ORAL DAILY
Qty: 90 TABLET | Refills: 0 | Status: SHIPPED | OUTPATIENT
Start: 2024-03-11

## 2024-03-11 NOTE — TELEPHONE ENCOUNTER
Discussed with daughter. Patient may have had a stroke and is currently in the ICU in [place].  Daughter also stated that spinal tap showed E. coli.  She will provide us with records

## 2024-03-11 NOTE — TELEPHONE ENCOUNTER
Medication: Amlodipine    Dose/Frequency: 10mg    Quantity: 90    Pharmacy: [facility]    Office:   [x] PCP/Provider -   [] Speciality/Provider -     Does the patient have enough for 3 days?   [] Yes   [x] No - Send as HP to POD        Medication: Glimepiride    Dose/Frequency: 4mg    Quantity: 90    Pharmacy: [facility]    Office:   [x] PCP/Provider -   [] Speciality/Provider -     Does the patient have enough for 3 days?   [] Yes   [x] No - Send as HP to POD        Medication: Lisinopril    Dose/Frequency: 40mg    Quantity: 90    Pharmacy: [facility]    Office:   [x] PCP/Provider -   [] Speciality/Provider -     Does the patient have enough for 3 days?   [x] Yes   [] No - Send as HP to POD      Medication: Metformin    Dose/Frequency: 1000mg    Quantity: 180    Pharmacy: [facility]    Office:   [x] PCP/Provider -   [] Speciality/Provider -     Does the patient have enough for 3 days?   [x] Yes   [] No - Send as HP to POD

## 2024-03-13 NOTE — TELEPHONE ENCOUNTER
Call from patient daughter. Patient might be taken out of ICU today. Patient diagnosed with meningitis. Daughter stated she hopes to be coming home sometime next week. Doctor's stated that patient needs to be scheduled right away after discharge as she needs to be given antibiotics through an IV. Daughter will call as soon as patient is discharged; she is hoping for an appointment next Friday.

## 2024-03-21 ENCOUNTER — TELEPHONE (OUTPATIENT)
Age: 64
End: 2024-03-21

## 2024-03-21 ENCOUNTER — APPOINTMENT (OUTPATIENT)
Dept: LAB | Age: 64
End: 2024-03-21
Payer: COMMERCIAL

## 2024-03-21 ENCOUNTER — OFFICE VISIT (OUTPATIENT)
Dept: FAMILY MEDICINE CLINIC | Facility: CLINIC | Age: 64
End: 2024-03-21
Payer: COMMERCIAL

## 2024-03-21 VITALS
HEART RATE: 66 BPM | WEIGHT: 182 LBS | DIASTOLIC BLOOD PRESSURE: 72 MMHG | HEIGHT: 63 IN | OXYGEN SATURATION: 97 % | SYSTOLIC BLOOD PRESSURE: 130 MMHG | BODY MASS INDEX: 32.25 KG/M2 | TEMPERATURE: 96.4 F

## 2024-03-21 DIAGNOSIS — E78.2 MIXED HYPERLIPIDEMIA: ICD-10-CM

## 2024-03-21 DIAGNOSIS — I10 PRIMARY HYPERTENSION: ICD-10-CM

## 2024-03-21 DIAGNOSIS — Z12.31 ENCOUNTER FOR SCREENING MAMMOGRAM FOR BREAST CANCER: ICD-10-CM

## 2024-03-21 DIAGNOSIS — Z12.11 SCREEN FOR COLON CANCER: ICD-10-CM

## 2024-03-21 DIAGNOSIS — E04.1 THYROID NODULE: ICD-10-CM

## 2024-03-21 DIAGNOSIS — E11.9 TYPE 2 DIABETES MELLITUS WITHOUT COMPLICATION, WITHOUT LONG-TERM CURRENT USE OF INSULIN (HCC): ICD-10-CM

## 2024-03-21 DIAGNOSIS — G03.9 MENINGITIS: Primary | ICD-10-CM

## 2024-03-21 LAB
LEFT EYE DIABETIC RETINOPATHY: ABNORMAL
LEFT EYE IMAGE QUALITY: ABNORMAL
LEFT EYE MACULAR EDEMA: ABNORMAL
LEFT EYE OTHER RETINOPATHY: ABNORMAL
RIGHT EYE DIABETIC RETINOPATHY: ABNORMAL
RIGHT EYE IMAGE QUALITY: ABNORMAL
RIGHT EYE MACULAR EDEMA: ABNORMAL
RIGHT EYE OTHER RETINOPATHY: ABNORMAL
SEVERITY (EYE EXAM): ABNORMAL
SL AMB POCT HEMOGLOBIN AIC: 10.7 (ref ?–6.5)

## 2024-03-21 PROCEDURE — 83036 HEMOGLOBIN GLYCOSYLATED A1C: CPT | Performed by: FAMILY MEDICINE

## 2024-03-21 PROCEDURE — 99214 OFFICE O/P EST MOD 30 MIN: CPT | Performed by: FAMILY MEDICINE

## 2024-03-21 PROCEDURE — 92250 FUNDUS PHOTOGRAPHY W/I&R: CPT | Performed by: FAMILY MEDICINE

## 2024-03-21 RX ORDER — DAPAGLIFLOZIN 10 MG/1
10 TABLET, FILM COATED ORAL DAILY
Qty: 90 TABLET | Refills: 1 | Status: SHIPPED | OUTPATIENT
Start: 2024-03-21 | End: 2024-09-17

## 2024-03-21 NOTE — TELEPHONE ENCOUNTER
Patient's daughter calling to schedule appt, states patient was just recently release from the hospital; in [place] 3/19.  Soonest avail May 24 th-scheduled and added to wait list.  Please advise if anything can be done to have patient seen sooner, daughter concerned that patient's A1c is higher. Please advise and call daughter.

## 2024-03-21 NOTE — TELEPHONE ENCOUNTER
Pts daughter called in to schedule colon screening. Pt was recently hospitalized. We were discussing appts at [facility] and call dropped.

## 2024-03-21 NOTE — PROGRESS NOTES
Name: Tracey Howard      : 1960      MRN: [id number]  Encounter Provider: Naya Moe MD  Encounter Date: 3/21/2024   Encounter department: [facility]    Assessment & Plan     Patient seems to be fully resolved from E. coli meningitis.  Structured to monitor for any new symptoms that may occur.  Unknown source for the E. coli as per patient and daughter.  We will check labs as below.  Patient's HbA1c uncontrolled at 10.7 today, recommend patient to contact endocrinology for follow-up.  I did review the previous note and they recommended insulin or Farxiga.  Patient declines insulin today, will start patient on Farxiga but I do still recommend follow-up with endocrinology.  Diabetic eye exam completed today.  Blood pressure stable 130/72, continue amlodipine and lisinopril.  Refer to GI for colon cancer screening.  Continue to work on diet and exercise.  RTC in 6 months for follow-up    1. Meningitis  -     CBC and differential; Future  -     CBC and differential    2. Encounter for screening mammogram for breast cancer  -     Mammo screening bilateral w 3d & cad; Future    3. Type 2 diabetes mellitus without complication, without long-term current use of insulin (HCC)  -     POCT hemoglobin A1c  -     Ambulatory referral to Diabetic Education - use to refer for diabetes group classes, individual diabetes education, medical nutrition therapy, device training; Future; Expected date: 2024  -     dapagliflozin (Farxiga) 10 MG tablet; Take 1 tablet (10 mg total) by mouth daily  -     Albumin / creatinine urine ratio; Future  -     Comprehensive metabolic panel; Future  -     IRIS Diabetic eye exam  -     Comprehensive metabolic panel    4. Primary hypertension    5. Screen for colon cancer  -     Ambulatory Referral to Gastroenterology; Future    6. Mixed hyperlipidemia           Subjective      She presents today for hospital follow-up after being diagnosed with E. coli  meningitis in [place].  Was started on antibiotics and PICC line was recently removed.  Patient states that she is doing very well now and has no complaints.  Her confusion that she was experiencing before has now resolved.  She is accompanied by her daughter today.  She also denies any complaints about her mother.  She has been compliant with her medications for diabetes and high blood pressure.  Followed up with endocrinology in the past.      Review of Systems   Constitutional:  Negative for activity change, appetite change, chills, fatigue and fever.   HENT:  Negative for congestion, rhinorrhea, sneezing and sore throat.    Eyes:  Negative for pain, discharge, redness and itching.   Respiratory:  Negative for cough, chest tightness, shortness of breath and wheezing.    Cardiovascular:  Negative for chest pain and palpitations.   Gastrointestinal:  Negative for abdominal distention, abdominal pain, constipation, diarrhea, nausea and vomiting.   Musculoskeletal:  Negative for arthralgias, back pain, joint swelling and myalgias.   Skin:  Negative for rash.   Neurological:  Negative for dizziness, weakness, numbness and headaches.   Hematological:  Negative for adenopathy.   Psychiatric/Behavioral:  Negative for agitation, behavioral problems, confusion, dysphoric mood and hallucinations.    All other systems reviewed and are negative.      Current Outpatient Medications on File Prior to Visit   Medication Sig   • amLODIPine (NORVASC) 10 mg tablet Take 1 tablet (10 mg total) by mouth daily   • Contour Next Test test strip USE TO TEST 2X DAILY   • glimepiride (AMARYL) 4 mg tablet Take 1 tablet (4 mg total) by mouth daily with breakfast   • lisinopril (ZESTRIL) 40 mg tablet Take 1 tablet (40 mg total) by mouth daily   • metFORMIN (GLUCOPHAGE) 1000 MG tablet Take 1 tablet (1,000 mg total) by mouth 2 (two) times a day with meals   • Microlet Lancets MISC Use to test 2x daily   • acetaminophen (TYLENOL) 325 mg  "tablet Take 3 tablets (975 mg total) by mouth every 8 (eight) hours as needed for mild pain (Patient not taking: Reported on 6/20/2023)   • Blood Glucose Monitoring Suppl (Contour Next EZ) w/Device KIT Use to test 2x daily   • metoprolol tartrate (LOPRESSOR) 25 mg tablet Take 1 tablet (25 mg total) by mouth every 12 (twelve) hours (Patient not taking: Reported on 6/20/2023)       Objective     /72 (BP Location: Right arm, Patient Position: Sitting, Cuff Size: Large)   Pulse 66   Temp (!) 96.4 °F (35.8 °C) (Temporal)   Ht 5' 3\" (1.6 m)   Wt 82.6 kg (182 lb)   LMP  (LMP Unknown)   SpO2 97%   BMI 32.24 kg/m²     Physical Exam  Vitals reviewed.   Constitutional:       General: She is not in acute distress.     Appearance: Normal appearance. She is well-developed. She is obese. She is not toxic-appearing or diaphoretic.   HENT:      Head: Normocephalic and atraumatic.      Right Ear: External ear normal.      Left Ear: External ear normal.      Nose: Nose normal.      Mouth/Throat:      Mouth: Mucous membranes are moist.   Eyes:      General: No scleral icterus.        Right eye: No discharge.         Left eye: No discharge.      Conjunctiva/sclera: Conjunctivae normal.   Cardiovascular:      Rate and Rhythm: Normal rate and regular rhythm.      Pulses: Normal pulses. no weak pulses.           Dorsalis pedis pulses are 2+ on the right side and 2+ on the left side.        Posterior tibial pulses are 2+ on the right side and 2+ on the left side.      Heart sounds: Normal heart sounds. No murmur heard.  Pulmonary:      Effort: Pulmonary effort is normal. No respiratory distress.      Breath sounds: Normal breath sounds. No wheezing.   Abdominal:      General: Abdomen is flat. There is no distension.      Palpations: Abdomen is soft. There is no mass.      Tenderness: There is no abdominal tenderness.      Hernia: No hernia is present.   Musculoskeletal:         General: No swelling, tenderness, deformity or " signs of injury.      Cervical back: Normal range of motion.   Feet:      Right foot:      Skin integrity: Callus present. No ulcer, skin breakdown, erythema, warmth or dry skin.      Left foot:      Skin integrity: Callus present. No ulcer, skin breakdown, erythema, warmth or dry skin.   Skin:     General: Skin is warm and dry.      Capillary Refill: Capillary refill takes less than 2 seconds.      Coloration: Skin is not pale.      Findings: No erythema.   Neurological:      General: No focal deficit present.      Mental Status: She is alert and oriented to person, place, and time.      Cranial Nerves: No cranial nerve deficit.      Sensory: No sensory deficit.      Motor: No weakness.      Coordination: Coordination normal.      Gait: Gait normal.   Psychiatric:         Mood and Affect: Mood normal.         Behavior: Behavior normal.       Naya Moe MD      Diabetic Foot Exam    Patient's shoes and socks removed.    Right Foot/Ankle   Right Foot Inspection  Skin Exam: skin normal, skin intact, callus and callus. No dry skin, no warmth, no erythema, no maceration, no abnormal color, no pre-ulcer and no ulcer.     Toe Exam: ROM and strength within normal limits.     Sensory   Monofilament testing: intact    Vascular  The right DP pulse is 2+. The right PT pulse is 2+.     Left Foot/Ankle  Left Foot Inspection  Skin Exam: skin normal, skin intact and callus. No dry skin, no warmth, no erythema, no maceration, normal color, no pre-ulcer and no ulcer.     Toe Exam: ROM and strength within normal limits.     Sensory   Monofilament testing: intact    Vascular  The left DP pulse is 2+. The left PT pulse is 2+.     Assign Risk Category  No deformity present  No loss of protective sensation  No weak pulses  Risk: 0

## 2024-03-22 ENCOUNTER — TELEPHONE (OUTPATIENT)
Dept: ENDOCRINOLOGY | Facility: CLINIC | Age: 64
End: 2024-03-22

## 2024-03-22 ENCOUNTER — OFFICE VISIT (OUTPATIENT)
Dept: ENDOCRINOLOGY | Facility: CLINIC | Age: 64
End: 2024-03-22
Payer: COMMERCIAL

## 2024-03-22 ENCOUNTER — TELEPHONE (OUTPATIENT)
Age: 64
End: 2024-03-22

## 2024-03-22 VITALS
WEIGHT: 188.2 LBS | DIASTOLIC BLOOD PRESSURE: 82 MMHG | OXYGEN SATURATION: 93 % | HEART RATE: 80 BPM | HEIGHT: 63 IN | BODY MASS INDEX: 33.35 KG/M2 | SYSTOLIC BLOOD PRESSURE: 122 MMHG

## 2024-03-22 DIAGNOSIS — L29.0 RECTAL ITCHING: Primary | ICD-10-CM

## 2024-03-22 DIAGNOSIS — E11.9 TYPE 2 DIABETES MELLITUS WITHOUT COMPLICATION, WITHOUT LONG-TERM CURRENT USE OF INSULIN (HCC): ICD-10-CM

## 2024-03-22 DIAGNOSIS — E11.9 TYPE 2 DIABETES MELLITUS WITHOUT COMPLICATION, WITHOUT LONG-TERM CURRENT USE OF INSULIN (HCC): Primary | ICD-10-CM

## 2024-03-22 DIAGNOSIS — I10 PRIMARY HYPERTENSION: ICD-10-CM

## 2024-03-22 DIAGNOSIS — E04.2 MULTIPLE THYROID NODULES: ICD-10-CM

## 2024-03-22 PROCEDURE — 99214 OFFICE O/P EST MOD 30 MIN: CPT

## 2024-03-22 RX ORDER — IBUPROFEN 600 MG/1
TABLET ORAL
Qty: 1 KIT | Refills: 0 | Status: SHIPPED | OUTPATIENT
Start: 2024-03-22 | End: 2024-03-22 | Stop reason: SDUPTHER

## 2024-03-22 RX ORDER — HYDROCORTISONE 25 MG/G
CREAM TOPICAL 2 TIMES DAILY
Qty: 28 G | Refills: 1 | Status: SHIPPED | OUTPATIENT
Start: 2024-03-22

## 2024-03-22 NOTE — ASSESSMENT & PLAN NOTE
Most recent hgbA1C shows poor control however according to daughter providing history via phone call today during the visit, her mother's blood sugars have been running lower since she returned home from hospitalization on 3/8/24. She reports blood sugars run between 80 to 256. She reports yesterday blood sugars were in the 60's despite eating well and drinking juice.     Recommend  - trial CGM. I have provided CPT codes for the daughter to run through the insurance. If feasible, she will set up a nurse visit for CGM trial  - continue checking blood sugars 2-3 times/day  - continue same regimen for now until more information is available  - will also put in order to have fasting blood sugar checked at the lab.    Reviewed 15:15 rule for hypoglycemia. Prescription for glucagon given    Lab Results   Component Value Date    HGBA1C 10.7 (A) 03/21/2024

## 2024-03-22 NOTE — TELEPHONE ENCOUNTER
Left message to discuss suspicious findings on thyroid US from 04/2023. Messages were previously left but unable to get through to patient. Biopsy is recommended

## 2024-03-22 NOTE — TELEPHONE ENCOUNTER
NOT A DUPLICATE-PLEASE RESEND SCRIPT - SCRIPT NOT RECEIVED BY PHARMACY     Reason for call:   [x] Refill   [] Prior Auth  [] Other:     Office:   [] PCP/Provider -   [x] Specialty/Provider -     Medication: Glucagon, rDNA, (Glucagon Emergency) 1 MG KIT     Dose/Frequency: Use in case of emergency for low blood sugar     Quantity: 1 kit    Pharmacy: [facility]/pharmacy #[id number] - [place], [place] - [address]     Does the patient have enough for 3 days?   [] Yes   [x] No - Send as HP to POD

## 2024-03-22 NOTE — TELEPHONE ENCOUNTER
PA for dapagliflozin (Farxiga) 10 MG tablet     Submitted via    [x][facility] [id number]  [][employer]-Case ID #   []Faxed to plan   []Other website   []Phone call Case ID #     Office notes sent, clinical questions answered. Awaiting determination    Turnaround time for your insurance to make a decision on your Prior Authorization can take 7-21 business days.

## 2024-03-22 NOTE — ASSESSMENT & PLAN NOTE
Suspicious nodules were found on imaging 4/2023. 2 were recommended to biopsy. Provider was unable to reach the patient at that time. These notes were reviewed after the visit ended today. I tried calling the phone number on file 2 times-  no answer. One voicemail was left. No complaints in this regard were brought up today. Will follow up at next patient visit/will wait for patient/daughter to call back to discuss further.

## 2024-03-22 NOTE — PROGRESS NOTES
Established Patient Progress Note    CC: Follow up for type 2 diabetes mellitus     Impression & Plan:    Problem List Items Addressed This Visit       HTN (hypertension)     Stable in office. Continue current regimen         Diabetes (HCC) - Primary     Most recent hgbA1C shows poor control however according to daughter providing history via phone call today during the visit, her mother's blood sugars have been running lower since she returned home from hospitalization on 3/8/24. She reports blood sugars run between 80 to 256. She reports yesterday blood sugars were in the 60's despite eating well and drinking juice.     Recommend  - trial CGM. I have provided CPT codes for the daughter to run through the insurance. If feasible, she will set up a nurse visit for CGM trial  - continue checking blood sugars 2-3 times/day  - continue same regimen for now until more information is available  - will also put in order to have fasting blood sugar checked at the lab.    Reviewed 15:15 rule for hypoglycemia. Prescription for glucagon given    Lab Results   Component Value Date    HGBA1C 10.7 (A) 03/21/2024            Relevant Medications    Glucagon, rDNA, (Glucagon Emergency) 1 MG KIT    Other Relevant Orders    Glucose, fasting    Multiple thyroid nodules     Suspicious nodules were found on imaging 4/2023. 2 were recommended to biopsy. Provider was unable to reach the patient at that time. These notes were reviewed after the visit ended today. I tried calling the phone number on file 2 times-  no answer. One voicemail was left. No complaints in this regard were brought up today. Will follow up at next patient visit/will wait for patient/daughter to call back to discuss further.            Orders Placed This Encounter   Procedures    Glucose, fasting     This is a patient instruction: This test requires patient fasting for 8 hours or longer.     Standing Status:   Future     Number of Occurrences:   1     Standing  Expiration Date:   3/22/2025       History of Present Illness:     Tracey Howard is a 63 y.o. female with a history of type 2 diabetes, hypertension, CKD. Complications:  proteinuria. Last A1C was 10.7. Home glucose monitoring: home glucose meter. No blood sugar log to review. The patient's son in law is present during the visit and daughter is present via phone assisting with history.    Hypoglycemia: yes- daughter reports patient's blood sugar was 58 this morning and 60's all day yesterday despite food and juice. Daughter reports she skipped evening dose of metformin due to this.  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 Daughter notes blood sugars have improved since returning from hospital. Notes blood sugars outside of yesterday range from . Daughter reports checking every 2 hours.  Problems with current regimen: no     Current regimen:   Metformin 1,000 mg BID  Glimepiride 4 mg daily   Farxiga- not taking. Would not be efficacious for BG control. GFR 42    ACE/ARB: lisinopril     Foot & eye exam: UTD    Note: Thyroid US ordered which revealed 2 suspicious nodules 4/2023. Biopsy for 2 nodules was recommended. Unable to reach patient at that time.       Patient Active Problem List   Diagnosis    MARQUITA (acute kidney injury) (HCC)    HTN (hypertension)    Diabetes (HCC)    Stage 3 chronic kidney disease, unspecified whether stage 3a or 3b CKD (HCC)    HSIL on Pap smear of cervix    Malignant neoplasm of exocervix (HCC)    Blood transfusion declined because patient is [employer]    Obesity, morbid (HCC)    Hypertensive urgency    Diabetic retinopathy associated with diabetes mellitus due to underlying condition (HCC)    BMI 33.0-33.9,adult    Multiple thyroid nodules      Past Medical History:   Diagnosis Date    Asthma     Chronic kidney disease     COVID 2021    hospitalized    Diabetes mellitus (HCC)     Hypercholesteremia     Hypertension     Wears dentures     Wears  glasses       Past Surgical History:   Procedure Laterality Date    CERVICAL BIOPSY N/A 4/21/2023    Procedure: BIOPSY LEEP CERVIX;  Surgeon: Tristen Browne MD;  Location: [facility];  Service: Gynecology Oncology    TUBAL LIGATION        Family History   Problem Relation Age of Onset    Diabetes unspecified Mother     Kidney disease Mother     No Known Problems Father     Kidney disease Sister     Breast cancer Sister     No Known Problems Sister     No Known Problems Sister     No Known Problems Sister     No Known Problems Sister     Heart disease Brother     Diabetes unspecified Brother     No Known Problems Brother      Social History     Tobacco Use    Smoking status: Never    Smokeless tobacco: Never   Substance Use Topics    Alcohol use: Never     Allergies   Allergen Reactions    Statins Myalgia     Bone pain         Current Outpatient Medications:     acetaminophen (TYLENOL) 325 mg tablet, Take 3 tablets (975 mg total) by mouth every 8 (eight) hours as needed for mild pain, Disp: , Rfl: 0    amLODIPine (NORVASC) 10 mg tablet, Take 1 tablet (10 mg total) by mouth daily, Disp: 90 tablet, Rfl: 0    Blood Glucose Monitoring Suppl (Contour Next EZ) w/Device KIT, Use to test 2x daily, Disp: 1 kit, Rfl: 1    Contour Next Test test strip, USE TO TEST 2X DAILY, Disp: 100 strip, Rfl: 3    glimepiride (AMARYL) 4 mg tablet, Take 1 tablet (4 mg total) by mouth daily with breakfast, Disp: 90 tablet, Rfl: 0    Glucagon, rDNA, (Glucagon Emergency) 1 MG KIT, Use in case of emergency for low blood sugar, Disp: 1 kit, Rfl: 0    lisinopril (ZESTRIL) 40 mg tablet, Take 1 tablet (40 mg total) by mouth daily, Disp: 90 tablet, Rfl: 0    metFORMIN (GLUCOPHAGE) 1000 MG tablet, Take 1 tablet (1,000 mg total) by mouth 2 (two) times a day with meals, Disp: 180 tablet, Rfl: 1    Microlet Lancets MISC, Use to test 2x daily, Disp: 100 each, Rfl: 3    dapagliflozin (Farxiga) 10 MG tablet, Take 1 tablet (10 mg total) by mouth daily  "(Patient not taking: Reported on 3/22/2024), Disp: 90 tablet, Rfl: 1    metoprolol tartrate (LOPRESSOR) 25 mg tablet, Take 1 tablet (25 mg total) by mouth every 12 (twelve) hours (Patient not taking: Reported on 6/20/2023), Disp: 60 tablet, Rfl: 0    Review of Systems   Constitutional:  Negative for chills and fever.   HENT:  Negative for ear pain and sore throat.    Eyes:  Negative for pain and visual disturbance.   Respiratory:  Negative for cough and shortness of breath.    Cardiovascular:  Negative for chest pain and palpitations.   Gastrointestinal:  Negative for abdominal pain and vomiting.   Genitourinary:  Negative for dysuria and hematuria.   Musculoskeletal:  Negative for arthralgias and back pain.   Skin:  Negative for color change and rash.   Neurological:  Negative for seizures and syncope.   All other systems reviewed and are negative.      Physical Exam:  Body mass index is 33.34 kg/m².  /82   Pulse 80   Ht 5' 3\" (1.6 m)   Wt 85.4 kg (188 lb 3.2 oz)   LMP  (LMP Unknown)   SpO2 93%   BMI 33.34 kg/m²    Wt Readings from Last 3 Encounters:   03/22/24 85.4 kg (188 lb 3.2 oz)   03/21/24 82.6 kg (182 lb)   09/18/23 88 kg (194 lb)       Physical Exam  Vitals reviewed.   Constitutional:       Appearance: Normal appearance.   HENT:      Head: Normocephalic and atraumatic.   Cardiovascular:      Rate and Rhythm: Normal rate.   Pulmonary:      Effort: Pulmonary effort is normal.   Neurological:      Mental Status: She is alert and oriented to person, place, and time.   Psychiatric:         Mood and Affect: Mood normal.         Behavior: Behavior normal.       Diabetic Foot Exam    Labs:   Lab Results   Component Value Date    HGBA1C 10.7 (A) 03/21/2024    HGBA1C 6.6 (A) 09/18/2023    HGBA1C 13.2 (A) 03/15/2023     Lab Results   Component Value Date    CREATININE 1.20 04/22/2023    CREATININE 1.22 03/21/2023    CREATININE 1.40 (H) 12/29/2021    BUN 23 04/22/2023    K 4.1 04/22/2023     " "04/22/2023    CO2 27 04/22/2023     eGFR   Date Value Ref Range Status   04/22/2023 48 ml/min/1.73sq m Final     Lab Results   Component Value Date    HDL 37 (L) 03/21/2023    TRIG 261 (H) 03/21/2023     Lab Results   Component Value Date    ALT 13 03/21/2023    AST 10 03/21/2023    ALKPHOS 86 03/21/2023     Lab Results   Component Value Date    DRW8MWRVGGXO 1.686 04/22/2023     No results found for: \"FREET4\", \"TSI\"      There are no Patient Instructions on file for this visit.      Discussed with the patient and all questioned fully answered. She will call me if any problems arise.        "

## 2024-03-22 NOTE — TELEPHONE ENCOUNTER
Patient called the RX Refill Line. Message is being forwarded to the office.     Patient is requesting a rectal cream that Dr Moe was supposed to be called in yesterday after her appointment. Please advise. She did not know the name just that it was a cream. [id number]     Please contact patient at

## 2024-03-24 ENCOUNTER — TELEPHONE (OUTPATIENT)
Age: 64
End: 2024-03-24

## 2024-03-24 NOTE — TELEPHONE ENCOUNTER
PA for Glucagon Emergency Kit    Submitted via    [x][employer] [id number]  [][employer]-Case ID #    []Faxed to plan   []Other website    []Phone call Case ID #      Office notes sent, clinical questions answered. Awaiting determination    Turnaround time for your insurance to make a decision on your Prior Authorization can take 7-21 business days.

## 2024-03-25 ENCOUNTER — TELEPHONE (OUTPATIENT)
Age: 64
End: 2024-03-25

## 2024-03-25 ENCOUNTER — NURSE TRIAGE (OUTPATIENT)
Dept: OTHER | Facility: OTHER | Age: 64
End: 2024-03-25

## 2024-03-25 NOTE — TELEPHONE ENCOUNTER
PA for dapagliflozin (Farxiga) 10 MG tablet  not required     Reason- (screenshot if applicable)              Message sent to provider pool No

## 2024-03-25 NOTE — TELEPHONE ENCOUNTER
Regarding: Mom running a fever of 100.4  ----- Message from Ruy Osman sent at 3/25/2024  6:03 PM EDT -----  '' My mom is running a fever of 100.4 her sugar is going up, she hasn't eaten since breakfast, she has no appetite. I wondering if these are symptoms of relapse from the meningitis concerned looking for medical advice on what to do.''

## 2024-03-25 NOTE — TELEPHONE ENCOUNTER
CG called regarding Mom's medication. Unable to speak with the CG as she is not on the communication form. Please call the patient regarding her glimepiride.  
I would recommend patient to call Endocrinology to schedule a follow up appointment. In the meantime, she can continue glimepiride, but continue to check blood sugars as we did start Faxiga recently. She may take the iron supplements, watch for constipation. If any worsening of weakness or appetite, may want to be seen in the ER
Lm for patient to call back.  
Patient daughter called stating that ever since patient stop taking glimepiride patient blood sugar started trending in the 's.Patient stop taking Glimepiride on Sunday. While patient was taking glimepiride patient sugar levels were in between 38-80. Patient daughter stated that patient is very weak and has no appetite. Patient daughter would like to know if patient should start taking iron supplement or iron infusions.   
Pt's daughter called for medication advice. She states she stopped giving pt glimepiride as it was lowering her glucose significantly. Pt is now feeling very weak and tired, loss of appetite and she does not know which medication she should give pt. Warm transferred her to [facility] to further assist.   
Spoke with daughter she will contact endocrinology.   
No

## 2024-03-25 NOTE — TELEPHONE ENCOUNTER
"patient started with temp of 100.4 and body aches. Reports taking fluids with adequate urine output but daughter is very concerned with increasing blood sugars. Her blood sugars usually range in the low 100's. Today blood sugar has been 145, 170, and most recently 189. Patient takes 100mg of Metformin BID. No other symptoms noted.    On call provider notified   Reason for Disposition   [1] Caller has URGENT medication or insulin pump question AND [2] triager unable to answer question    Answer Assessment - Initial Assessment Questions  1. BLOOD GLUCOSE: \"What is your blood glucose level?\"       Most recent today was 189  2. ONSET: \"When did you check the blood glucose?\"      today  3. USUAL RANGE: \"What is your glucose level usually?\" (e.g., usual fasting morning value, usual evening value)      Low 100s  6. INSULIN: \"Do you take insulin?\" \"What type of insulin(s) do you use? What is the mode of delivery? (syringe, pen; injection or pump)?\"       denies  7. DIABETES PILLS: \"Do you take any pills for your diabetes?\" If yes, ask: \"Have you missed taking any pills recently?\"      Metformin  8. OTHER SYMPTOMS: \"Do you have any symptoms?\" (e.g., fever, frequent urination, difficulty breathing, dizziness, weakness, vomiting)      Fever, body aches    Protocols used: Diabetes - High Blood Sugar-ADULT-AH    "

## 2024-03-27 RX ORDER — IBUPROFEN 600 MG/1
TABLET ORAL
Qty: 1 KIT | Refills: 0 | Status: SHIPPED | OUTPATIENT
Start: 2024-03-27

## 2024-04-10 ENCOUNTER — OFFICE VISIT (OUTPATIENT)
Dept: FAMILY MEDICINE CLINIC | Facility: CLINIC | Age: 64
End: 2024-04-10
Payer: COMMERCIAL

## 2024-04-10 ENCOUNTER — TELEPHONE (OUTPATIENT)
Age: 64
End: 2024-04-10

## 2024-04-10 VITALS
DIASTOLIC BLOOD PRESSURE: 82 MMHG | OXYGEN SATURATION: 98 % | SYSTOLIC BLOOD PRESSURE: 124 MMHG | BODY MASS INDEX: 32.43 KG/M2 | HEART RATE: 111 BPM | HEIGHT: 63 IN | TEMPERATURE: 97.6 F | WEIGHT: 183 LBS

## 2024-04-10 DIAGNOSIS — R21 RASH: Primary | ICD-10-CM

## 2024-04-10 DIAGNOSIS — E11.9 TYPE 2 DIABETES MELLITUS WITHOUT COMPLICATION, WITHOUT LONG-TERM CURRENT USE OF INSULIN (HCC): ICD-10-CM

## 2024-04-10 DIAGNOSIS — K64.9 HEMORRHOIDS, UNSPECIFIED HEMORRHOID TYPE: ICD-10-CM

## 2024-04-10 DIAGNOSIS — I10 PRIMARY HYPERTENSION: ICD-10-CM

## 2024-04-10 PROCEDURE — 99214 OFFICE O/P EST MOD 30 MIN: CPT | Performed by: FAMILY MEDICINE

## 2024-04-10 RX ORDER — PREDNISONE 10 MG/1
TABLET ORAL DAILY
Qty: 30 TABLET | Refills: 0 | Status: SHIPPED | OUTPATIENT
Start: 2024-04-10 | End: 2024-04-20

## 2024-04-10 NOTE — PROGRESS NOTES
Chief Complaint   Patient presents with   • Rash     Itchy rash all over x  2-3 wks.      Name: Tracey Howard      : 1960      MRN: [id number]  Encounter Provider: Naya Moe MD  Encounter Date: 4/10/2024   Encounter department: [facility]    Assessment & Plan     Will refer patient over to dermatology and start her on prednisone taper.  May continue Benadryl as needed.  Blood pressure stable today 124/82, continue amlodipine and lisinopril.  Continue metformin as prescribed as well as glimepiride for diabetes.  States that her hemorrhoids have much improved since starting Anusol, continue as needed.  RTC as needed    1. Rash  -     predniSONE 10 mg tablet; Take 5 tablets (50 mg total) by mouth daily for 2 days, THEN 4 tablets (40 mg total) daily for 2 days, THEN 3 tablets (30 mg total) daily for 2 days, THEN 2 tablets (20 mg total) daily for 2 days, THEN 1 tablet (10 mg total) daily for 2 days.  -     Ambulatory Referral to Dermatology; Future    2. Primary hypertension    3. Type 2 diabetes mellitus without complication, without long-term current use of insulin (HCC)  -     Ambulatory referral to Diabetic Education - use to refer for diabetes group classes, individual diabetes education, medical nutrition therapy, device training; Future; Expected date: 04/10/2024    4. Hemorrhoids, unspecified hemorrhoid type           Subjective      She presents today to discuss an itchy rash over her entire body since discharge from the hospital recently.  States that she has been scratching at it all day long.  Denies any new soaps or new animals in the house.  Compliant with her medications otherwise.      Review of Systems   Constitutional:  Negative for activity change, appetite change, chills, fatigue and fever.   HENT:  Negative for congestion, rhinorrhea, sneezing and sore throat.    Eyes:  Negative for pain, discharge, redness and itching.   Respiratory:  Negative for cough,  "chest tightness, shortness of breath and wheezing.    Cardiovascular:  Negative for chest pain and palpitations.   Gastrointestinal:  Negative for abdominal distention, abdominal pain, constipation, diarrhea, nausea and vomiting.   Musculoskeletal:  Negative for arthralgias, back pain, joint swelling and myalgias.   Skin:  Positive for rash.   Neurological:  Negative for dizziness, weakness, numbness and headaches.   Hematological:  Negative for adenopathy.   Psychiatric/Behavioral:  Negative for dysphoric mood.    All other systems reviewed and are negative.      Current Outpatient Medications on File Prior to Visit   Medication Sig   • acetaminophen (TYLENOL) 325 mg tablet Take 3 tablets (975 mg total) by mouth every 8 (eight) hours as needed for mild pain   • amLODIPine (NORVASC) 10 mg tablet Take 1 tablet (10 mg total) by mouth daily   • Blood Glucose Monitoring Suppl (Contour Next EZ) w/Device KIT Use to test 2x daily   • Contour Next Test test strip USE TO TEST 2X DAILY   • glimepiride (AMARYL) 4 mg tablet Take 1 tablet (4 mg total) by mouth daily with breakfast   • Glucagon, rDNA, (Glucagon Emergency) 1 MG KIT Use in case of emergency for low blood sugar   • hydrocortisone (ANUSOL-HC) 2.5 % rectal cream Apply topically 2 (two) times a day   • lisinopril (ZESTRIL) 40 mg tablet Take 1 tablet (40 mg total) by mouth daily   • metFORMIN (GLUCOPHAGE) 1000 MG tablet Take 1 tablet (1,000 mg total) by mouth 2 (two) times a day with meals   • Microlet Lancets MISC Use to test 2x daily   • dapagliflozin (Farxiga) 10 MG tablet Take 1 tablet (10 mg total) by mouth daily (Patient not taking: Reported on 3/22/2024)   • metoprolol tartrate (LOPRESSOR) 25 mg tablet Take 1 tablet (25 mg total) by mouth every 12 (twelve) hours (Patient not taking: Reported on 6/20/2023)       Objective     /82   Pulse (!) 111   Temp 97.6 °F (36.4 °C)   Ht 5' 3\" (1.6 m)   Wt 83 kg (183 lb)   LMP  (LMP Unknown)   SpO2 98%   BMI 32.42 " kg/m²     Physical Exam  Vitals reviewed.   Constitutional:       General: She is not in acute distress.     Appearance: Normal appearance. She is well-developed. She is not toxic-appearing or diaphoretic.   HENT:      Head: Normocephalic and atraumatic.      Right Ear: External ear normal.      Left Ear: External ear normal.      Nose: Nose normal.      Mouth/Throat:      Mouth: Mucous membranes are moist.   Eyes:      General: No scleral icterus.        Right eye: No discharge.         Left eye: No discharge.      Conjunctiva/sclera: Conjunctivae normal.   Cardiovascular:      Rate and Rhythm: Normal rate and regular rhythm.      Pulses: Normal pulses.      Heart sounds: Normal heart sounds. No murmur heard.  Pulmonary:      Effort: Pulmonary effort is normal. No respiratory distress.      Breath sounds: Normal breath sounds. No wheezing.   Abdominal:      General: There is no distension.      Palpations: Abdomen is soft. There is no mass.      Tenderness: There is no abdominal tenderness.      Hernia: No hernia is present.   Musculoskeletal:         General: No swelling, tenderness, deformity or signs of injury.      Cervical back: Normal range of motion.   Skin:     General: Skin is warm and dry.      Capillary Refill: Capillary refill takes less than 2 seconds.      Coloration: Skin is not pale.      Findings: Rash (petechial rash on arms and back) present. No erythema.   Neurological:      General: No focal deficit present.      Mental Status: She is alert.   Psychiatric:         Mood and Affect: Mood normal.         Behavior: Behavior normal.       Naya Moe MD

## 2024-04-10 NOTE — TELEPHONE ENCOUNTER
Daughter and mother called requesting clarification on Rx: Prednisone daily does.    Medication daily dose clarified and understood.

## 2024-04-16 ENCOUNTER — APPOINTMENT (OUTPATIENT)
Dept: LAB | Age: 64
End: 2024-04-16
Payer: COMMERCIAL

## 2024-04-16 LAB
ALBUMIN SERPL BCP-MCNC: 3.8 G/DL (ref 3.5–5)
ALP SERPL-CCNC: 73 U/L (ref 34–104)
ALT SERPL W P-5'-P-CCNC: 6 U/L (ref 7–52)
ANION GAP SERPL CALCULATED.3IONS-SCNC: 8 MMOL/L (ref 4–13)
AST SERPL W P-5'-P-CCNC: 9 U/L (ref 13–39)
BASOPHILS # BLD AUTO: 0.06 THOUSANDS/ÂΜL (ref 0–0.1)
BASOPHILS NFR BLD AUTO: 1 % (ref 0–1)
BILIRUB SERPL-MCNC: 0.58 MG/DL (ref 0.2–1)
BUN SERPL-MCNC: 24 MG/DL (ref 5–25)
CALCIUM SERPL-MCNC: 9.5 MG/DL (ref 8.4–10.2)
CHLORIDE SERPL-SCNC: 102 MMOL/L (ref 96–108)
CO2 SERPL-SCNC: 29 MMOL/L (ref 21–32)
CREAT SERPL-MCNC: 1.28 MG/DL (ref 0.6–1.3)
CREAT UR-MCNC: 41.9 MG/DL
EOSINOPHIL # BLD AUTO: 0.32 THOUSAND/ÂΜL (ref 0–0.61)
EOSINOPHIL NFR BLD AUTO: 3 % (ref 0–6)
ERYTHROCYTE [DISTWIDTH] IN BLOOD BY AUTOMATED COUNT: 14.3 % (ref 11.6–15.1)
GFR SERPL CREATININE-BSD FRML MDRD: 44 ML/MIN/1.73SQ M
GLUCOSE P FAST SERPL-MCNC: 161 MG/DL (ref 65–99)
HCT VFR BLD AUTO: 37.1 % (ref 34.8–46.1)
HGB BLD-MCNC: 11.5 G/DL (ref 11.5–15.4)
IMM GRANULOCYTES # BLD AUTO: 0.06 THOUSAND/UL (ref 0–0.2)
IMM GRANULOCYTES NFR BLD AUTO: 1 % (ref 0–2)
LYMPHOCYTES # BLD AUTO: 1.74 THOUSANDS/ÂΜL (ref 0.6–4.47)
LYMPHOCYTES NFR BLD AUTO: 15 % (ref 14–44)
MCH RBC QN AUTO: 26.9 PG (ref 26.8–34.3)
MCHC RBC AUTO-ENTMCNC: 31 G/DL (ref 31.4–37.4)
MCV RBC AUTO: 87 FL (ref 82–98)
MICROALBUMIN UR-MCNC: 274.3 MG/L
MICROALBUMIN/CREAT 24H UR: 655 MG/G CREATININE (ref 0–30)
MONOCYTES # BLD AUTO: 0.58 THOUSAND/ÂΜL (ref 0.17–1.22)
MONOCYTES NFR BLD AUTO: 5 % (ref 4–12)
NEUTROPHILS # BLD AUTO: 8.91 THOUSANDS/ÂΜL (ref 1.85–7.62)
NEUTS SEG NFR BLD AUTO: 75 % (ref 43–75)
NRBC BLD AUTO-RTO: 0 /100 WBCS
PLATELET # BLD AUTO: 458 THOUSANDS/UL (ref 149–390)
PMV BLD AUTO: 9.7 FL (ref 8.9–12.7)
POTASSIUM SERPL-SCNC: 4.6 MMOL/L (ref 3.5–5.3)
PROT SERPL-MCNC: 6.6 G/DL (ref 6.4–8.4)
RBC # BLD AUTO: 4.28 MILLION/UL (ref 3.81–5.12)
SODIUM SERPL-SCNC: 139 MMOL/L (ref 135–147)
TSH SERPL DL<=0.05 MIU/L-ACNC: 0.65 UIU/ML (ref 0.45–4.5)
WBC # BLD AUTO: 11.67 THOUSAND/UL (ref 4.31–10.16)

## 2024-04-17 DIAGNOSIS — E11.9 TYPE 2 DIABETES MELLITUS WITHOUT COMPLICATION, WITHOUT LONG-TERM CURRENT USE OF INSULIN (HCC): ICD-10-CM

## 2024-04-17 RX ORDER — LANCETS
EACH MISCELLANEOUS
Qty: 100 EACH | Refills: 1 | Status: SHIPPED | OUTPATIENT
Start: 2024-04-17

## 2024-04-17 NOTE — TELEPHONE ENCOUNTER
Medication: Microlet Lancets MISC    Dose/Frequency:      Quantity: 100    Ph[facility]acy: [id number] [address]    Office:   [x] PCP/Provider -   [] Speciality/Provider -     Does the patient have enough for 3 days?   [x] Yes   [] No - Send as HP to POD

## 2024-04-18 ENCOUNTER — TELEPHONE (OUTPATIENT)
Age: 64
End: 2024-04-18

## 2024-04-18 NOTE — TELEPHONE ENCOUNTER
Patient's daughter returned call from the office regarding patient's labs and PCP recommendations and verbalized understanding.

## 2024-05-15 ENCOUNTER — OFFICE VISIT (OUTPATIENT)
Dept: GASTROENTEROLOGY | Facility: CLINIC | Age: 64
End: 2024-05-15
Payer: COMMERCIAL

## 2024-05-15 VITALS
WEIGHT: 186.2 LBS | TEMPERATURE: 97.7 F | BODY MASS INDEX: 32.99 KG/M2 | DIASTOLIC BLOOD PRESSURE: 84 MMHG | SYSTOLIC BLOOD PRESSURE: 128 MMHG | HEIGHT: 63 IN

## 2024-05-15 DIAGNOSIS — Z12.11 SCREEN FOR COLON CANCER: ICD-10-CM

## 2024-05-15 DIAGNOSIS — K57.90 DIVERTICULAR DISEASE: Primary | ICD-10-CM

## 2024-05-15 DIAGNOSIS — K57.32 DIVERTICULITIS OF LARGE INTESTINE WITHOUT PERFORATION OR ABSCESS WITHOUT BLEEDING: ICD-10-CM

## 2024-05-15 PROCEDURE — 99243 OFF/OP CNSLTJ NEW/EST LOW 30: CPT | Performed by: INTERNAL MEDICINE

## 2024-05-15 NOTE — PROGRESS NOTES
[facility] - Outpatient Consultation  Tracey Howard 63 y.o. female MRN: [id number]  Encounter: [id number]      PCP: Naya Moe MD  Referring: Naya Moe MD  [address]  [address]  [facility] [place] [address]      ASSESSMENT AND PLAN:      1. Screen for colon cancer  2. Sigmoid diverticulitis    Patient was admitted with E. coli meningitis in March 2024.  CT abdominal imaging showed uncomplicated sigmoid diverticulitis.    Has not had any previous colonoscopy  Patient is not on any antiplatelets or anticoagulants  No family history of colon cancer  Will help schedule for colonoscopy  No changes in bowel movements or weight loss    Tom Brown MD   Gastroenterology Fellow      ______________________________________________________________________    CC:  Chief Complaint   Patient presents with    Consult    Screening Colonoscopy     Patient here for colonoscopy screening       HPI:  63 yr old F w/ history of HTN, HLD, uncontrolled DM 2 (HgbA1c 10.7 3/24), CKD who presents to GI clinic for colon cancer screening.    Patient was admitted in [place] in March 2024 with E. coli meningitis.  He had presented to the hospital with altered mental status and initially thought to have stroke but her brain imaging was unremarkable.  She had a lumbar puncture done which showed E. coli meningitis.  Her CT imaging showed sigmoid diverticulitis-with no perforation or abscess.    She has never had a colonoscopy before.  Denies any family history of colon cancer.  Denies any changes in her stool.      REVIEW OF SYSTEMS:    CONSTITUTIONAL: Denies any fever, chills, rigors, and weight loss.  HEENT: No earache or tinnitus. Denies hearing loss or visual disturbances.  CARDIOVASCULAR: No chest pain or palpitations.   RESPIRATORY: Denies any cough, hemoptysis, shortness of breath or dyspnea on exertion.  GASTROINTESTINAL: As noted in the History of Present Illness.   GENITOURINARY: No  problems with urination. Denies any hematuria or dysuria.  NEUROLOGIC: No dizziness or vertigo, denies headaches.   MUSCULOSKELETAL: Denies any muscle or joint pain.   SKIN: Denies skin rashes or itching.   ENDOCRINE: Denies excessive thirst. Denies intolerance to heat or cold.  PSYCHOSOCIAL: Denies depression or anxiety. Denies any recent memory loss.       Historical Information   Past Medical History:   Diagnosis Date    Asthma     Chronic kidney disease     COVID 2021    hospitalized    Diabetes mellitus (HCC)     Hypercholesteremia     Hypertension     Wears dentures     Wears glasses      Past Surgical History:   Procedure Laterality Date    CERVICAL BIOPSY N/A 4/21/2023    Procedure: BIOPSY LEEP CERVIX;  Surgeon: Tristen Browne MD;  Location: [facility];  Service: Gynecology Oncology    TUBAL LIGATION       Social History   Social History     Substance and Sexual Activity   Alcohol Use Never     Social History     Substance and Sexual Activity   Drug Use Never     Social History     Tobacco Use   Smoking Status Never   Smokeless Tobacco Never     Family History   Problem Relation Age of Onset    Diabetes unspecified Mother     Kidney disease Mother     No Known Problems Father     Kidney disease Sister     Breast cancer Sister     No Known Problems Sister     No Known Problems Sister     No Known Problems Sister     No Known Problems Sister     Heart disease Brother     Diabetes unspecified Brother     No Known Problems Brother        Meds/Allergies       Current Outpatient Medications:     acetaminophen (TYLENOL) 325 mg tablet    amLODIPine (NORVASC) 10 mg tablet    Blood Glucose Monitoring Suppl (Contour Next EZ) w/Device KIT    Contour Next Test test strip    glimepiride (AMARYL) 4 mg tablet    Glucagon, rDNA, (Glucagon Emergency) 1 MG KIT    hydrocortisone (ANUSOL-HC) 2.5 % rectal cream    lisinopril (ZESTRIL) 40 mg tablet    metFORMIN (GLUCOPHAGE) 1000 MG tablet    Microlet Lancets MISC     "dapagliflozin (Farxiga) 10 MG tablet    metoprolol tartrate (LOPRESSOR) 25 mg tablet    Allergies   Allergen Reactions    Statins Myalgia     Bone pain           Objective     Blood pressure 128/84, temperature 97.7 °F (36.5 °C), temperature source Tympanic, height 5' 3\" (1.6 m), weight 84.5 kg (186 lb 3.2 oz). Body mass index is 32.98 kg/m².     PHYSICAL EXAM:      General Appearance:   Alert, cooperative, no distress   HEENT:   Normocephalic, atraumatic, anicteric.     Neck:  Supple, symmetrical, trachea midline   Lungs:   Clear to auscultation bilaterally; no rales, rhonchi or wheezing; respirations unlabored    Heart::   Regular rate and rhythm; no murmur, rub, or gallop.   Abdomen:   Soft, non-tender, non-distended; normal bowel sounds; no masses, no organomegaly    Genitalia:   Deferred    Rectal:   Deferred    Extremities:  No cyanosis, clubbing or edema    Pulses:  2+ and symmetric    Skin:  No jaundice, rashes, or lesions    Lymph nodes:  No palpable cervical lymphadenopathy        Lab Results:     Lab Results   Component Value Date    WBC 11.67 (H) 04/16/2024    HGB 11.5 04/16/2024    HCT 37.1 04/16/2024    MCV 87 04/16/2024     (H) 04/16/2024       Lab Results   Component Value Date    K 4.6 04/16/2024     04/16/2024    CO2 29 04/16/2024    BUN 24 04/16/2024    CREATININE 1.28 04/16/2024    GLUF 161 (H) 04/16/2024    CALCIUM 9.5 04/16/2024    CORRECTEDCA 9.6 03/21/2023    AST 9 (L) 04/16/2024    ALT 6 (L) 04/16/2024    ALKPHOS 73 04/16/2024    EGFR 44 04/16/2024       No results found for: \"INR\", \"PROTIME\"      Radiology Results:   No results found.    Portions of the record may have been created with voice recognition software. Occasional wrong word or \"sound a like\" substitutions may have occurred due to the inherent limitations of voice recognition software. Read the chart carefully and recognize, using context, where substitutions have occurred.       "

## 2024-05-15 NOTE — PATIENT INSTRUCTIONS
Scheduled date of colonoscopy (as of today):7/23/24  Physician performing colonoscopy: Dr. Reyes  Location of colonoscopy: [place]  Bowel prep reviewed with patient: Clenpiq  Instructions reviewed with patient by:Kim manzanares [facility]  Clearances: N/A  Diabetic, Farxiga, Amaryl, Metformin

## 2024-06-07 DIAGNOSIS — I10 PRIMARY HYPERTENSION: ICD-10-CM

## 2024-06-08 RX ORDER — AMLODIPINE BESYLATE 10 MG/1
10 TABLET ORAL DAILY
Qty: 90 TABLET | Refills: 0 | Status: SHIPPED | OUTPATIENT
Start: 2024-06-08

## 2024-06-08 RX ORDER — LISINOPRIL 40 MG/1
40 TABLET ORAL DAILY
Qty: 90 TABLET | Refills: 0 | Status: SHIPPED | OUTPATIENT
Start: 2024-06-08

## 2024-07-10 ENCOUNTER — TELEPHONE (OUTPATIENT)
Dept: GASTROENTEROLOGY | Facility: CLINIC | Age: 64
End: 2024-07-10

## 2024-07-10 NOTE — TELEPHONE ENCOUNTER
Called and spoke to her daughter and emailed correct prep for golytely and also mailed instructions to address on file

## 2024-07-10 NOTE — TELEPHONE ENCOUNTER
----- Message from Radha Reyes DO sent at 7/10/2024  9:00 AM EDT -----  Sorry for the confusion I agree GoLytely will be recommended for her  ----- Message -----  From: Bianca Berrios  Sent: 7/9/2024   3:03 PM EDT  To: DO Dr. Amy Weber,    I was performing the Confirmation calls for your procedures for 7/23 and called this patient to confirm. The decision tree and wrap up notes state the prep is Clenpiq however Dr. Brown called in Golytely prep. Can you please confirm the preparation you feel the patient would be best suited for and please advise so we can get her the correct preparation instructions? She isn't for sure if the Clenpiq would be covered under her insurance or not.     Thank you,    Bianca

## 2024-07-22 ENCOUNTER — TELEPHONE (OUTPATIENT)
Dept: GASTROENTEROLOGY | Facility: HOSPITAL | Age: 64
End: 2024-07-22

## 2024-07-22 NOTE — PROGRESS NOTES
Patient informed via voicemail that Farxiga needed to be held for 4 days    respiratory complications

## 2024-07-23 ENCOUNTER — ANESTHESIA EVENT (OUTPATIENT)
Dept: GASTROENTEROLOGY | Facility: HOSPITAL | Age: 64
End: 2024-07-23

## 2024-07-23 ENCOUNTER — ANESTHESIA (OUTPATIENT)
Dept: GASTROENTEROLOGY | Facility: HOSPITAL | Age: 64
End: 2024-07-23

## 2024-07-23 ENCOUNTER — HOSPITAL ENCOUNTER (OUTPATIENT)
Dept: GASTROENTEROLOGY | Facility: HOSPITAL | Age: 64
Setting detail: OUTPATIENT SURGERY
Discharge: HOME | End: 2024-07-23
Attending: INTERNAL MEDICINE
Payer: COMMERCIAL

## 2024-07-23 VITALS
BODY MASS INDEX: 36.52 KG/M2 | DIASTOLIC BLOOD PRESSURE: 86 MMHG | RESPIRATION RATE: 16 BRPM | SYSTOLIC BLOOD PRESSURE: 164 MMHG | WEIGHT: 186 LBS | OXYGEN SATURATION: 100 % | HEART RATE: 86 BPM | TEMPERATURE: 96.8 F | HEIGHT: 60 IN

## 2024-07-23 DIAGNOSIS — Z12.11 SCREEN FOR COLON CANCER: ICD-10-CM

## 2024-07-23 LAB — GLUCOSE SERPL-MCNC: 126 MG/DL (ref 65–140)

## 2024-07-23 PROCEDURE — 82948 REAGENT STRIP/BLOOD GLUCOSE: CPT

## 2024-07-23 PROCEDURE — 45385 COLONOSCOPY W/LESION REMOVAL: CPT | Performed by: INTERNAL MEDICINE

## 2024-07-23 PROCEDURE — 88305 TISSUE EXAM BY PATHOLOGIST: CPT | Performed by: PATHOLOGY

## 2024-07-23 PROCEDURE — 88342 IMHCHEM/IMCYTCHM 1ST ANTB: CPT | Performed by: PATHOLOGY

## 2024-07-23 PROCEDURE — 88341 IMHCHEM/IMCYTCHM EA ADD ANTB: CPT | Performed by: PATHOLOGY

## 2024-07-23 RX ORDER — MIDAZOLAM HYDROCHLORIDE 2 MG/2ML
INJECTION, SOLUTION INTRAMUSCULAR; INTRAVENOUS AS NEEDED
Status: DISCONTINUED | OUTPATIENT
Start: 2024-07-23 | End: 2024-07-23

## 2024-07-23 RX ORDER — SODIUM CHLORIDE, SODIUM LACTATE, POTASSIUM CHLORIDE, CALCIUM CHLORIDE 600; 310; 30; 20 MG/100ML; MG/100ML; MG/100ML; MG/100ML
100 INJECTION, SOLUTION INTRAVENOUS CONTINUOUS
Status: CANCELLED | OUTPATIENT
Start: 2024-07-23

## 2024-07-23 RX ORDER — LIDOCAINE HCL/PF 100 MG/5ML
SYRINGE (ML) INJECTION AS NEEDED
Status: DISCONTINUED | OUTPATIENT
Start: 2024-07-23 | End: 2024-07-23

## 2024-07-23 RX ORDER — SODIUM CHLORIDE 9 MG/ML
INJECTION, SOLUTION INTRAVENOUS CONTINUOUS PRN
Status: DISCONTINUED | OUTPATIENT
Start: 2024-07-23 | End: 2024-07-23

## 2024-07-23 RX ORDER — PROPOFOL 10 MG/ML
INJECTION, EMULSION INTRAVENOUS AS NEEDED
Status: DISCONTINUED | OUTPATIENT
Start: 2024-07-23 | End: 2024-07-23

## 2024-07-23 RX ADMIN — PROPOFOL 50 MG: 10 INJECTION, EMULSION INTRAVENOUS at 09:11

## 2024-07-23 RX ADMIN — PROPOFOL 50 MG: 10 INJECTION, EMULSION INTRAVENOUS at 09:02

## 2024-07-23 RX ADMIN — SODIUM CHLORIDE: 0.9 INJECTION, SOLUTION INTRAVENOUS at 08:39

## 2024-07-23 RX ADMIN — PROPOFOL 30 MG: 10 INJECTION, EMULSION INTRAVENOUS at 09:36

## 2024-07-23 RX ADMIN — LIDOCAINE HYDROCHLORIDE 100 MG: 20 INJECTION INTRAVENOUS at 09:04

## 2024-07-23 RX ADMIN — MIDAZOLAM 2 MG: 1 INJECTION INTRAMUSCULAR; INTRAVENOUS at 08:50

## 2024-07-23 RX ADMIN — PROPOFOL 100 MG: 10 INJECTION, EMULSION INTRAVENOUS at 08:58

## 2024-07-23 RX ADMIN — PROPOFOL 50 MG: 10 INJECTION, EMULSION INTRAVENOUS at 09:15

## 2024-07-23 RX ADMIN — PROPOFOL 50 MG: 10 INJECTION, EMULSION INTRAVENOUS at 09:06

## 2024-07-23 RX ADMIN — PROPOFOL 30 MG: 10 INJECTION, EMULSION INTRAVENOUS at 09:28

## 2024-07-23 NOTE — ANESTHESIA POSTPROCEDURE EVALUATION
Post-Op Assessment Note    CV Status:  Stable    Pain management: adequate       Mental Status:  Awake   Hydration Status:  Euvolemic   PONV Controlled:  Controlled   Airway Patency:  Patent     Post Op Vitals Reviewed: Yes    Anethesia notable event occurred.    Staff: Anesthesiologist, with CRNAs               /69 (07/23/24 0942)    Temp (!) 96.8 °F (36 °C) (07/23/24 0942)    Pulse 75 (07/23/24 0942)   Resp 16 (07/23/24 0942)    SpO2 99 % (07/23/24 0942)    bi

## 2024-07-23 NOTE — H&P
History and Physical - [facility]  Tracey Howard 63 y.o. female MRN: [id number]                  HPI: Tracey Howard is a 63 y.o. year old female with history of sigmoid diverticulitis who presents for colonoscopy for colorectal cancer screening. No Fhx and no prior colonoscopies      REVIEW OF SYSTEMS: Per the HPI, and otherwise unremarkable.    Historical Information   Past Medical History:   Diagnosis Date    Asthma     Chronic kidney disease     COVID 2021    hospitalized    Diabetes mellitus (HCC)     Hypercholesteremia     Hypertension     Wears dentures     Wears glasses      Past Surgical History:   Procedure Laterality Date    CERVICAL BIOPSY N/A 4/21/2023    Procedure: BIOPSY LEEP CERVIX;  Surgeon: Tristen Browne MD;  Location: [facility];  Service: Gynecology Oncology    TUBAL LIGATION       Social History   Social History     Substance and Sexual Activity   Alcohol Use Never     Social History     Substance and Sexual Activity   Drug Use Never     Social History     Tobacco Use   Smoking Status Never   Smokeless Tobacco Never     Family History   Problem Relation Age of Onset    Diabetes unspecified Mother     Kidney disease Mother     No Known Problems Father     Kidney disease Sister     Breast cancer Sister     No Known Problems Sister     No Known Problems Sister     No Known Problems Sister     No Known Problems Sister     Heart disease Brother     Diabetes unspecified Brother     No Known Problems Brother        Meds/Allergies       Current Outpatient Medications:     amLODIPine (NORVASC) 10 mg tablet    lisinopril (ZESTRIL) 40 mg tablet    metFORMIN (GLUCOPHAGE) 1000 MG tablet    acetaminophen (TYLENOL) 325 mg tablet    Blood Glucose Monitoring Suppl (Contour Next EZ) w/Device KIT    Contour Next Test test strip    Glucagon, rDNA, (Glucagon Emergency) 1 MG KIT    hydrocortisone (ANUSOL-HC) 2.5 % rectal cream    Microlet Lancets MISC    polyethylene glycol (GOLYTELY) 4000  mL solution    Allergies   Allergen Reactions    Statins Myalgia     Bone pain       Objective     Ht 5' (1.524 m)   Wt 84.4 kg (186 lb)   LMP  (LMP Unknown)   BMI 36.33 kg/m²       PHYSICAL EXAM    Gen: NAD  Head: NCAT  CV: RRR  CHEST: Clear  ABD: soft, NT/ND  EXT: no edema      ASSESSMENT/PLAN:  This is a 63 y.o. year old female here for colonoscopy for colorectal cancer screening, and she is stable and optimized for her procedure.

## 2024-07-23 NOTE — ANESTHESIA PREPROCEDURE EVALUATION
Procedure:  COLONOSCOPY    Relevant Problems   ANESTHESIA (within normal limits)      CARDIO   (+) HTN (hypertension)   (+) Hypertensive urgency      /RENAL   (+) MARQUITA (acute kidney injury) (HCC)   (+) Stage 3 chronic kidney disease, unspecified whether stage 3a or 3b CKD (HCC)      GYN   (+) Malignant neoplasm of exocervix (HCC)        Physical Exam    Airway    Mallampati score: II  TM Distance: >3 FB  Neck ROM: full     Dental       Cardiovascular  Rate: normal    Pulmonary  Pulmonary exam normal     Other Findings  Per pt denies anything remaining that is loose or removeablepost-pubertal.      Anesthesia Plan  ASA Score- 3     Anesthesia Type- IV sedation with anesthesia with ASA Monitors.         Additional Monitors:     Airway Plan:            Plan Factors-Exercise tolerance (METS): >4 METS.    Chart reviewed.    Patient summary reviewed.                  Induction- intravenous.    Postoperative Plan-         Informed Consent- Anesthetic plan and risks discussed with patient.  I personally reviewed this patient with the CRNA. Discussed and agreed on the Anesthesia Plan with the CRNA..

## 2024-07-25 PROCEDURE — 88305 TISSUE EXAM BY PATHOLOGIST: CPT | Performed by: PATHOLOGY

## 2024-07-25 PROCEDURE — 88341 IMHCHEM/IMCYTCHM EA ADD ANTB: CPT | Performed by: PATHOLOGY

## 2024-07-25 PROCEDURE — 88342 IMHCHEM/IMCYTCHM 1ST ANTB: CPT | Performed by: PATHOLOGY

## 2024-08-08 ENCOUNTER — TELEPHONE (OUTPATIENT)
Dept: FAMILY MEDICINE CLINIC | Facility: CLINIC | Age: 64
End: 2024-08-08

## 2024-08-20 ENCOUNTER — OFFICE VISIT (OUTPATIENT)
Dept: FAMILY MEDICINE CLINIC | Facility: CLINIC | Age: 64
End: 2024-08-20
Payer: COMMERCIAL

## 2024-08-20 VITALS
BODY MASS INDEX: 34.78 KG/M2 | HEIGHT: 62 IN | HEART RATE: 97 BPM | OXYGEN SATURATION: 99 % | DIASTOLIC BLOOD PRESSURE: 80 MMHG | TEMPERATURE: 97.5 F | WEIGHT: 189 LBS | SYSTOLIC BLOOD PRESSURE: 138 MMHG

## 2024-08-20 DIAGNOSIS — E11.9 TYPE 2 DIABETES MELLITUS WITHOUT COMPLICATION, WITHOUT LONG-TERM CURRENT USE OF INSULIN (HCC): ICD-10-CM

## 2024-08-20 DIAGNOSIS — Z00.00 ANNUAL PHYSICAL EXAM: Primary | ICD-10-CM

## 2024-08-20 DIAGNOSIS — I10 PRIMARY HYPERTENSION: ICD-10-CM

## 2024-08-20 DIAGNOSIS — N18.30 STAGE 3 CHRONIC KIDNEY DISEASE, UNSPECIFIED WHETHER STAGE 3A OR 3B CKD (HCC): ICD-10-CM

## 2024-08-20 PROCEDURE — 99213 OFFICE O/P EST LOW 20 MIN: CPT | Performed by: FAMILY MEDICINE

## 2024-08-20 PROCEDURE — 99396 PREV VISIT EST AGE 40-64: CPT | Performed by: FAMILY MEDICINE

## 2024-08-20 RX ORDER — AMLODIPINE BESYLATE 10 MG/1
10 TABLET ORAL DAILY
Qty: 90 TABLET | Refills: 1 | Status: SHIPPED | OUTPATIENT
Start: 2024-08-20

## 2024-08-20 RX ORDER — LISINOPRIL 40 MG/1
40 TABLET ORAL DAILY
Qty: 90 TABLET | Refills: 1 | Status: SHIPPED | OUTPATIENT
Start: 2024-08-20

## 2024-08-20 NOTE — PROGRESS NOTES
Adult Annual Physical  Name: Tracey Howard      : 1960      MRN: [id number]  Encounter Provider: Naya Moe MD  Encounter Date: 2024   Encounter department: [facility]    Assessment & Plan   1. Annual physical exam  Assessment & Plan:  Annual physical exam completed today.  Canceled her mammogram as she states that she was busy.  Not interested in DEXA bone scan.  2. Type 2 diabetes mellitus without complication, without long-term current use of insulin ([facility])  Assessment & Plan:    Lab Results   Component Value Date    HGBA1C 10.7 (A) 2024   Diabetes uncontrolled.  Not interested in any other medications besides metformin.  Recommend patient to follow-up with endocrinology and to start a second medication.  Recheck labs  Orders:  -     Ambulatory referral to Diabetic Education - use to refer for diabetes group classes, individual diabetes education, medical nutrition therapy, device training; Future; Expected date: 2024  -     Lipid panel; Future  -     Comprehensive metabolic panel; Future  -     Hemoglobin A1C; Future  -     metFORMIN (GLUCOPHAGE) 1000 MG tablet; Take 1 tablet (1,000 mg total) by mouth 2 (two) times a day with meals  3. Primary hypertension  Assessment & Plan:  Blood pressure slightly elevated today 138/80, refill amlodipine and lisinopril  Orders:  -     amLODIPine (NORVASC) 10 mg tablet; Take 1 tablet (10 mg total) by mouth daily  -     lisinopril (ZESTRIL) 40 mg tablet; Take 1 tablet (40 mg total) by mouth daily  4. Stage 3 chronic kidney disease, unspecified whether stage 3a or 3b CKD ([facility])  Assessment & Plan:  Lab Results   Component Value Date    EGFR 44 2024    EGFR 48 2023    EGFR 47 2023    CREATININE 1.28 2024    CREATININE 1.20 2023    CREATININE 1.22 2023   Recheck labs.  Stay well-hydrated    Immunizations and preventive care screenings were discussed with patient today. Appropriate  "education was printed on patient's after visit summary.    Counseling:  Dental Health: discussed importance of regular tooth brushing, flossing, and dental visits.  Exercise: the importance of regular exercise/physical activity was discussed. Recommend exercise 3-5 times per week for at least 30 minutes.          History of Present Illness     Adult Annual Physical:  Patient presents for annual physical.     Diet and Physical Activity:  - Diet/Nutrition: well balanced diet.  - Exercise:. exercises at home    General Health:  - Sleep: sleeps well.  - Hearing: normal hearing bilateral ears.  - Vision: vision problems.  - Dental: regular dental visits.    /GYN Health:    - Menopause: postmenopausal.     Advanced Care Planning:  - Has an advanced directive?: yes    - Has a durable medical POA?: no    - ACP document given to patient?: no      Review of Systems   Constitutional:  Negative for activity change, appetite change, chills, fatigue and fever.   HENT:  Negative for congestion, rhinorrhea, sneezing and sore throat.    Eyes:  Negative for pain, discharge, redness and itching.   Respiratory:  Negative for cough, chest tightness, shortness of breath and wheezing.    Cardiovascular:  Negative for chest pain and palpitations.   Gastrointestinal:  Negative for abdominal distention, abdominal pain, constipation, diarrhea, nausea and vomiting.   Musculoskeletal:  Negative for arthralgias, back pain, joint swelling and myalgias.   Skin:  Negative for rash.   Neurological:  Negative for dizziness, weakness, numbness and headaches.   Hematological:  Negative for adenopathy.   Psychiatric/Behavioral:  Negative for confusion and dysphoric mood.    All other systems reviewed and are negative.        Objective     /80   Pulse 97   Temp 97.5 °F (36.4 °C) (Temporal)   Ht 5' 1.5\" (1.562 m)   Wt 85.7 kg (189 lb)   LMP  (LMP Unknown)   SpO2 99%   BMI 35.13 kg/m²     Physical Exam  Vitals reviewed.   Constitutional:     "   General: She is not in acute distress.     Appearance: Normal appearance. She is well-developed. She is not ill-appearing or toxic-appearing.   HENT:      Head: Normocephalic and atraumatic.      Right Ear: Tympanic membrane, ear canal and external ear normal. There is no impacted cerumen.      Left Ear: Tympanic membrane, ear canal and external ear normal. There is no impacted cerumen.      Nose: Nose normal. No congestion or rhinorrhea.      Mouth/Throat:      Mouth: Mucous membranes are moist.      Pharynx: Oropharynx is clear. No oropharyngeal exudate.   Eyes:      General: No scleral icterus.        Right eye: No discharge.         Left eye: No discharge.      Extraocular Movements: Extraocular movements intact.      Conjunctiva/sclera: Conjunctivae normal.      Pupils: Pupils are equal, round, and reactive to light.   Cardiovascular:      Rate and Rhythm: Normal rate and regular rhythm.      Pulses: Normal pulses.      Heart sounds: Normal heart sounds. No murmur heard.  Pulmonary:      Effort: Pulmonary effort is normal. No respiratory distress.      Breath sounds: Normal breath sounds.   Abdominal:      General: Abdomen is flat. There is no distension.      Palpations: Abdomen is soft.      Tenderness: There is no abdominal tenderness.   Musculoskeletal:         General: No tenderness. Normal range of motion.      Cervical back: Normal range of motion.   Skin:     General: Skin is warm and dry.      Findings: No rash.   Neurological:      General: No focal deficit present.      Mental Status: She is alert.      Motor: No weakness.      Gait: Gait normal.   Psychiatric:         Mood and Affect: Mood normal.         Behavior: Behavior normal.

## 2024-08-20 NOTE — ASSESSMENT & PLAN NOTE
Lab Results   Component Value Date    HGBA1C 10.7 (A) 03/21/2024   Diabetes uncontrolled.  Not interested in any other medications besides metformin.  Recommend patient to follow-up with endocrinology and to start a second medication.  Recheck labs

## 2024-08-20 NOTE — ASSESSMENT & PLAN NOTE
Lab Results   Component Value Date    EGFR 44 04/16/2024    EGFR 48 04/22/2023    EGFR 47 03/21/2023    CREATININE 1.28 04/16/2024    CREATININE 1.20 04/22/2023    CREATININE 1.22 03/21/2023   Recheck labs.  Stay well-hydrated

## 2024-08-20 NOTE — PATIENT INSTRUCTIONS
"Patient Education     Routine physical for adults   The Basics   Written by the doctors and editors at [facility]   What is a physical? -- A physical is a routine visit, or \"check-up,\" with your doctor. You might also hear it called a \"wellness visit\" or \"preventive visit.\"  During each visit, the doctor will:   Ask about your physical and mental health   Ask about your habits, behaviors, and lifestyle   Do an exam   Give you vaccines if needed   Talk to you about any medicines you take   Give advice about your health   Answer your questions  Getting regular check-ups is an important part of taking care of your health. It can help your doctor find and treat any problems you have. But it's also important for preventing health problems.  A routine physical is different from a \"sick visit.\" A sick visit is when you see a doctor because of a health concern or problem. Since physicals are scheduled ahead of time, you can think about what you want to ask the doctor.  How often should I get a physical? -- It depends on your age and health. In general, for people age 21 years and older:   If you are younger than 50 years, you might be able to get a physical every 3 years.   If you are 50 years or older, your doctor might recommend a physical every year.  If you have an ongoing health condition, like diabetes or high blood pressure, your doctor will probably want to see you more often.  What happens during a physical? -- In general, each visit will include:   Physical exam - The doctor or nurse will check your height, weight, heart rate, and blood pressure. They will also look at your eyes and ears. They will ask about how you are feeling and whether you have any symptoms that bother you.   Medicines - It's a good idea to bring a list of all the medicines you take to each doctor visit. Your doctor will talk to you about your medicines and answer any questions. Tell them if you are having any side effects that bother you. You " "should also tell them if you are having trouble paying for any of your medicines.   Habits and behaviors - This includes:   Your diet   Your exercise habits   Whether you smoke, drink alcohol, or use drugs   Whether you are sexually active   Whether you feel safe at home  Your doctor will talk to you about things you can do to improve your health and lower your risk of health problems. They will also offer help and support. For example, if you want to quit smoking, they can give you advice and might prescribe medicines. If you want to improve your diet or get more physical activity, they can help you with this, too.   Lab tests, if needed - The tests you get will depend on your age and situation. For example, your doctor might want to check your:   Cholesterol   Blood sugar   Iron level   Vaccines - The recommended vaccines will depend on your age, health, and what vaccines you already had. Vaccines are very important because they can prevent certain serious or deadly infections.   Discussion of screening - \"Screening\" means checking for diseases or other health problems before they cause symptoms. Your doctor can recommend screening based on your age, risk, and preferences. This might include tests to check for:   Cancer, such as breast, prostate, cervical, ovarian, colorectal, prostate, lung, or skin cancer   Sexually transmitted infections, such as chlamydia and gonorrhea   Mental health conditions like depression and anxiety  Your doctor will talk to you about the different types of screening tests. They can help you decide which screenings to have. They can also explain what the results might mean.   Answering questions - The physical is a good time to ask the doctor or nurse questions about your health. If needed, they can refer you to other doctors or specialists, too.  Adults older than 65 years often need other care, too. As you get older, your doctor will talk to you about:   How to prevent falling at " home   Hearing or vision tests   Memory testing   How to take your medicines safely   Making sure that you have the help and support you need at home  All topics are updated as new evidence becomes available and our peer review process is complete.  This topic retrieved from [employer] on: May 02, 2024.  Topic 663371 Version 1.0  Release: 32.4.3 - C32.122  © 2024 UpToDate, Inc. and/or its affiliates. All rights reserved.  Consumer Information Use and Disclaimer   Disclaimer: This generalized information is a limited summary of diagnosis, treatment, and/or medication information. It is not meant to be comprehensive and should be used as a tool to help the user understand and/or assess potential diagnostic and treatment options. It does NOT include all information about conditions, treatments, medications, side effects, or risks that may apply to a specific patient. It is not intended to be medical advice or a substitute for the medical advice, diagnosis, or treatment of a health care provider based on the health care provider's examination and assessment of a patient's specific and unique circumstances. Patients must speak with a health care provider for complete information about their health, medical questions, and treatment options, including any risks or benefits regarding use of medications. This information does not endorse any treatments or medications as safe, effective, or approved for treating a specific patient. UpToDate, Inc. and its affiliates disclaim any warranty or liability relating to this information or the use thereof.The use of this information is governed by the Terms of Use, available at https://www.[employer].com/en/know/clinical-effectiveness-terms. 2024© UpToDate, Inc. and its affiliates and/or licensors. All rights reserved.  Copyright   © 2024 UpToDate, Inc. and/or its affiliates. All rights reserved.

## 2024-08-20 NOTE — ASSESSMENT & PLAN NOTE
Annual physical exam completed today.  Canceled her mammogram as she states that she was busy.  Not interested in DEXA bone scan.

## 2024-11-05 DIAGNOSIS — E11.9 TYPE 2 DIABETES MELLITUS WITHOUT COMPLICATION, WITHOUT LONG-TERM CURRENT USE OF INSULIN (HCC): ICD-10-CM

## 2024-11-06 RX ORDER — LANCETS
EACH MISCELLANEOUS
Qty: 100 EACH | Refills: 1 | Status: SHIPPED | OUTPATIENT
Start: 2024-11-06

## 2025-02-18 ENCOUNTER — TELEPHONE (OUTPATIENT)
Dept: FAMILY MEDICINE CLINIC | Facility: CLINIC | Age: 65
End: 2025-02-18

## 2025-02-18 NOTE — TELEPHONE ENCOUNTER
Left message for patient to reschedule her appt on 2/21/25 with Dr. Moe. Did offer for patient to come in early or a different day.

## 2025-02-23 DIAGNOSIS — E11.9 TYPE 2 DIABETES MELLITUS WITHOUT COMPLICATION, WITHOUT LONG-TERM CURRENT USE OF INSULIN (HCC): ICD-10-CM

## 2025-02-23 DIAGNOSIS — I10 PRIMARY HYPERTENSION: ICD-10-CM

## 2025-02-24 RX ORDER — AMLODIPINE BESYLATE 10 MG/1
10 TABLET ORAL DAILY
Qty: 90 TABLET | Refills: 0 | Status: SHIPPED | OUTPATIENT
Start: 2025-02-24

## 2025-02-25 ENCOUNTER — OFFICE VISIT (OUTPATIENT)
Dept: FAMILY MEDICINE CLINIC | Facility: CLINIC | Age: 65
End: 2025-02-25
Payer: COMMERCIAL

## 2025-02-25 VITALS
HEIGHT: 62 IN | RESPIRATION RATE: 16 BRPM | SYSTOLIC BLOOD PRESSURE: 150 MMHG | DIASTOLIC BLOOD PRESSURE: 98 MMHG | BODY MASS INDEX: 35.51 KG/M2 | TEMPERATURE: 97.9 F | WEIGHT: 193 LBS | OXYGEN SATURATION: 98 % | HEART RATE: 109 BPM

## 2025-02-25 DIAGNOSIS — E11.9 TYPE 2 DIABETES MELLITUS WITHOUT COMPLICATION, WITHOUT LONG-TERM CURRENT USE OF INSULIN (HCC): ICD-10-CM

## 2025-02-25 DIAGNOSIS — I10 PRIMARY HYPERTENSION: Primary | ICD-10-CM

## 2025-02-25 PROCEDURE — 99213 OFFICE O/P EST LOW 20 MIN: CPT | Performed by: FAMILY MEDICINE

## 2025-02-25 RX ORDER — BLOOD PRESSURE TEST KIT
KIT MISCELLANEOUS 2 TIMES DAILY
Qty: 1 KIT | Refills: 0 | Status: SHIPPED | OUTPATIENT
Start: 2025-02-25

## 2025-02-25 NOTE — PROGRESS NOTES
Name: Tracey Howard      : 1960      MRN: [id number]  Encounter Provider: Naya Moe MD  Encounter Date: 2025   Encounter department: [facility]  :  Assessment & Plan  Primary hypertension  Blood pressure is elevated today 150/98, states that she is compliant with her medications.  I do recommend for her to check her pressures twice a day, I placed an order for a blood pressure kit she will let us know her blood pressure numbers in a few days/weeks  Orders:  •  Blood Pressure KIT; Use 2 (two) times a day    Type 2 diabetes mellitus without complication, without long-term current use of insulin (HCC)  Hemoglobin A1c highly elevated 10.7.  We did discuss on multiple occasions starting another medication on top of the metformin, I do recommend insulin.  She is not interested in taking any other medication at this time besides metformin.  Labs were previously ordered, states that she will have these done tomorrow  Lab Results   Component Value Date    HGBA1C 10.7 (A) 2024       Orders:  •  Blood Pressure KIT; Use 2 (two) times a day           History of Present Illness   She presents today for follow-up on diabetes, hypertension.  She had labs ordered previously that were not drawn yet.  Reports being compliant with her medications.  Denies any new complaints today.      Review of Systems   Constitutional:  Negative for activity change, appetite change, chills, fatigue and fever.   HENT:  Negative for congestion, rhinorrhea, sneezing and sore throat.    Eyes:  Negative for pain, discharge, redness and itching.   Respiratory:  Negative for cough, chest tightness, shortness of breath and wheezing.    Cardiovascular:  Negative for chest pain and palpitations.   Gastrointestinal:  Negative for abdominal distention, abdominal pain, constipation, diarrhea, nausea and vomiting.   Musculoskeletal:  Negative for arthralgias, back pain, joint swelling and myalgias.   Skin:   "Negative for rash.   Neurological:  Negative for dizziness, weakness, numbness and headaches.   Hematological:  Negative for adenopathy.   Psychiatric/Behavioral:  Negative for confusion.    All other systems reviewed and are negative.      Objective   /98   Pulse (!) 109   Temp 97.9 °F (36.6 °C) (Temporal)   Resp 16   Ht 5' 1.5\" (1.562 m)   Wt 87.5 kg (193 lb)   LMP  (LMP Unknown)   SpO2 98%   BMI 35.88 kg/m²      Physical Exam  Vitals reviewed.   Constitutional:       General: She is not in acute distress.     Appearance: Normal appearance. She is well-developed. She is not ill-appearing or toxic-appearing.   HENT:      Head: Normocephalic and atraumatic.      Right Ear: External ear normal.      Left Ear: External ear normal.      Nose: Nose normal. No congestion or rhinorrhea.      Mouth/Throat:      Mouth: Mucous membranes are moist.   Eyes:      General: No scleral icterus.        Right eye: No discharge.         Left eye: No discharge.      Extraocular Movements: Extraocular movements intact.      Conjunctiva/sclera: Conjunctivae normal.      Pupils: Pupils are equal, round, and reactive to light.   Cardiovascular:      Rate and Rhythm: Normal rate and regular rhythm.      Pulses: Normal pulses.      Heart sounds: Normal heart sounds. No murmur heard.  Pulmonary:      Effort: Pulmonary effort is normal. No respiratory distress.      Breath sounds: Normal breath sounds.   Abdominal:      General: There is no distension.      Palpations: Abdomen is soft. There is no mass.      Tenderness: There is no abdominal tenderness.      Hernia: No hernia is present.   Musculoskeletal:         General: No swelling, tenderness, deformity or signs of injury. Normal range of motion.      Cervical back: Normal range of motion.   Skin:     General: Skin is warm and dry.      Findings: No bruising, erythema, lesion or rash.   Neurological:      General: No focal deficit present.      Mental Status: She is alert. "      Motor: No weakness.      Gait: Gait normal.   Psychiatric:         Mood and Affect: Mood normal.         Behavior: Behavior normal.

## 2025-02-25 NOTE — ASSESSMENT & PLAN NOTE
Hemoglobin A1c highly elevated 10.7.  We did discuss on multiple occasions starting another medication on top of the metformin, I do recommend insulin.  She is not interested in taking any other medication at this time besides metformin.  Labs were previously ordered, states that she will have these done tomorrow  Lab Results   Component Value Date    HGBA1C 10.7 (A) 03/21/2024       Orders:  •  Blood Pressure KIT; Use 2 (two) times a day

## 2025-02-25 NOTE — ASSESSMENT & PLAN NOTE
Blood pressure is elevated today 150/98, states that she is compliant with her medications.  I do recommend for her to check her pressures twice a day, I placed an order for a blood pressure kit she will let us know her blood pressure numbers in a few days/weeks  Orders:  •  Blood Pressure KIT; Use 2 (two) times a day

## 2025-03-01 ENCOUNTER — APPOINTMENT (OUTPATIENT)
Dept: LAB | Age: 65
End: 2025-03-01
Payer: MEDICARE

## 2025-03-01 DIAGNOSIS — E11.9 TYPE 2 DIABETES MELLITUS WITHOUT COMPLICATION, WITHOUT LONG-TERM CURRENT USE OF INSULIN (HCC): ICD-10-CM

## 2025-03-01 LAB
ALBUMIN SERPL BCG-MCNC: 4 G/DL (ref 3.5–5)
ALP SERPL-CCNC: 74 U/L (ref 34–104)
ALT SERPL W P-5'-P-CCNC: 9 U/L (ref 7–52)
ANION GAP SERPL CALCULATED.3IONS-SCNC: 12 MMOL/L (ref 4–13)
AST SERPL W P-5'-P-CCNC: 16 U/L (ref 13–39)
BILIRUB SERPL-MCNC: 0.33 MG/DL (ref 0.2–1)
BUN SERPL-MCNC: 28 MG/DL (ref 5–25)
CALCIUM SERPL-MCNC: 9.5 MG/DL (ref 8.4–10.2)
CHLORIDE SERPL-SCNC: 104 MMOL/L (ref 96–108)
CHOLEST SERPL-MCNC: 157 MG/DL (ref ?–200)
CO2 SERPL-SCNC: 26 MMOL/L (ref 21–32)
CREAT SERPL-MCNC: 1.3 MG/DL (ref 0.6–1.3)
GFR SERPL CREATININE-BSD FRML MDRD: 43 ML/MIN/1.73SQ M
GLUCOSE P FAST SERPL-MCNC: 197 MG/DL (ref 65–99)
HDLC SERPL-MCNC: 35 MG/DL
LDLC SERPL CALC-MCNC: 79 MG/DL (ref 0–100)
NONHDLC SERPL-MCNC: 122 MG/DL
POTASSIUM SERPL-SCNC: 4.8 MMOL/L (ref 3.5–5.3)
PROT SERPL-MCNC: 7.2 G/DL (ref 6.4–8.4)
SODIUM SERPL-SCNC: 142 MMOL/L (ref 135–147)
TRIGL SERPL-MCNC: 214 MG/DL (ref ?–150)

## 2025-03-01 PROCEDURE — 83036 HEMOGLOBIN GLYCOSYLATED A1C: CPT

## 2025-03-01 PROCEDURE — 80061 LIPID PANEL: CPT

## 2025-03-01 PROCEDURE — 80053 COMPREHEN METABOLIC PANEL: CPT

## 2025-03-01 PROCEDURE — 36415 COLL VENOUS BLD VENIPUNCTURE: CPT

## 2025-03-03 ENCOUNTER — RESULTS FOLLOW-UP (OUTPATIENT)
Dept: FAMILY MEDICINE CLINIC | Facility: CLINIC | Age: 65
End: 2025-03-03

## 2025-03-03 LAB
EST. AVERAGE GLUCOSE BLD GHB EST-MCNC: 186 MG/DL
HBA1C MFR BLD: 8.1 %

## 2025-03-05 ENCOUNTER — TELEPHONE (OUTPATIENT)
Dept: FAMILY MEDICINE CLINIC | Facility: CLINIC | Age: 65
End: 2025-03-05

## 2025-03-05 DIAGNOSIS — I10 PRIMARY HYPERTENSION: ICD-10-CM

## 2025-03-05 NOTE — TELEPHONE ENCOUNTER
Patient called the RX Refill Line. Message is being forwarded to the office.     Patient is requesting a call back regarding lab results that she had done on 03/01/25 Please review    Please contact patient at [phone]

## 2025-03-05 NOTE — TELEPHONE ENCOUNTER
Reason for call:   [x] Refill   [] Prior Auth  [] Other:     Office:   [x] PCP/Provider - Naya Moe MD / [facility]   [] Specialty/Provider -     Medication: lisinopril (ZESTRIL) 40 mg tablet      Dose/Frequency: Take 1 tablet (40 mg total) by mouth daily     Quantity: 90    Pharmacy: [facility]/pharmacy #[id number] [facility] [place] - [address]   Does the patient have enough for 3 days?   [x] Yes   [] No - Send as HP to POD    Mail Away Pharmacy   Does the patient have enough for 10 days?   [] Yes   [] No - Send as HP to POD

## 2025-03-06 RX ORDER — LISINOPRIL 40 MG/1
40 TABLET ORAL DAILY
Qty: 90 TABLET | Refills: 1 | Status: SHIPPED | OUTPATIENT
Start: 2025-03-06

## 2025-03-24 ENCOUNTER — TELEPHONE (OUTPATIENT)
Age: 65
End: 2025-03-24

## 2025-03-24 NOTE — TELEPHONE ENCOUNTER
Pt's daughter called the office requesting for the provider to place in an order to test for diverticulitis. Please review and advise.

## 2025-03-24 NOTE — TELEPHONE ENCOUNTER
I called and spoke with the patient's daughter, patient has been having some right sided abdominal pain that does go away when massaged is not constant patient has a history of abdominal infection. Scheduled an appt to be evaluated tomorrow

## 2025-03-25 ENCOUNTER — OFFICE VISIT (OUTPATIENT)
Dept: FAMILY MEDICINE CLINIC | Facility: CLINIC | Age: 65
End: 2025-03-25
Payer: MEDICARE

## 2025-03-25 VITALS
TEMPERATURE: 98 F | OXYGEN SATURATION: 98 % | SYSTOLIC BLOOD PRESSURE: 180 MMHG | HEART RATE: 109 BPM | BODY MASS INDEX: 35.19 KG/M2 | WEIGHT: 191.2 LBS | DIASTOLIC BLOOD PRESSURE: 96 MMHG | HEIGHT: 62 IN

## 2025-03-25 DIAGNOSIS — N18.32 CHRONIC RENAL FAILURE, STAGE 3B (HCC): ICD-10-CM

## 2025-03-25 DIAGNOSIS — I10 PRIMARY HYPERTENSION: ICD-10-CM

## 2025-03-25 DIAGNOSIS — E66.01 OBESITY, MORBID (HCC): ICD-10-CM

## 2025-03-25 DIAGNOSIS — E08.319 DIABETIC RETINOPATHY ASSOCIATED WITH DIABETES MELLITUS DUE TO UNDERLYING CONDITION, MACULAR EDEMA PRESENCE UNSPECIFIED, UNSPECIFIED LATERALITY, UNSPECIFIED RETINOPATHY SEVERITY (HCC): ICD-10-CM

## 2025-03-25 DIAGNOSIS — E11.65 TYPE 2 DIABETES MELLITUS WITH HYPERGLYCEMIA, WITHOUT LONG-TERM CURRENT USE OF INSULIN (HCC): ICD-10-CM

## 2025-03-25 DIAGNOSIS — I16.0 HYPERTENSIVE URGENCY: ICD-10-CM

## 2025-03-25 DIAGNOSIS — C53.1 MALIGNANT NEOPLASM OF EXOCERVIX (HCC): ICD-10-CM

## 2025-03-25 DIAGNOSIS — R10.31 RIGHT LOWER QUADRANT ABDOMINAL PAIN: Primary | ICD-10-CM

## 2025-03-25 PROCEDURE — 99214 OFFICE O/P EST MOD 30 MIN: CPT | Performed by: FAMILY MEDICINE

## 2025-03-25 PROCEDURE — G2211 COMPLEX E/M VISIT ADD ON: HCPCS | Performed by: FAMILY MEDICINE

## 2025-03-25 RX ORDER — METOPROLOL TARTRATE 25 MG/1
25 TABLET, FILM COATED ORAL EVERY 12 HOURS
Qty: 60 TABLET | Refills: 5 | Status: SHIPPED | OUTPATIENT
Start: 2025-03-25

## 2025-03-25 NOTE — ASSESSMENT & PLAN NOTE
Discussed sugars not controlled and need for the eye doctor Patient to continue metformin I will see her in 2 weeks Patient needs to see endocrinology  Lab Results   Component Value Date    HGBA1C 8.1 (H) 03/01/2025       Orders:    Ambulatory Referral to Endocrinology; Future

## 2025-03-25 NOTE — ASSESSMENT & PLAN NOTE
Lab Results   Component Value Date    EGFR 43 03/01/2025    EGFR 44 04/16/2024    EGFR 48 04/22/2023    CREATININE 1.30 03/01/2025    CREATININE 1.28 04/16/2024    CREATININE 1.20 04/22/2023   GFR is stable continue current care with the lsiinopril and the norvasc Added the llopressor followup in 2 weeks avoid NSAIDS stay hydrated

## 2025-03-25 NOTE — ASSESSMENT & PLAN NOTE
Demetrius poor control and metformin briefly discussed the GLP1  and insulin Will have her check sugars and record She will also see endocrinology and I will see her in 2 weeks   Lab Results   Component Value Date    HGBA1C 8.1 (H) 03/01/2025       Orders:    Ambulatory Referral to Endocrinology; Future

## 2025-03-25 NOTE — ASSESSMENT & PLAN NOTE
Refer back to specilaist I reviewed her last visit with them the biopsy and the pap Patient and daugther agree to see GYN  Orders:    CT abdomen pelvis wo contrast; Future    Ambulatory Referral to Gynecologic Oncology; Future

## 2025-03-25 NOTE — PROGRESS NOTES
Diabetic Foot Exam    Patient's shoes and socks removed.    Right Foot/Ankle   Right Foot Inspection  Skin Exam: skin normal, skin intact and dry skin. No warmth, no callus, no erythema, no maceration, no abnormal color, no pre-ulcer, no ulcer and no callus.     Toe Exam: ROM and strength within normal limits.     Sensory   Monofilament testing: intact    Vascular  Capillary refills: < 3 seconds  The right DP pulse is 2+. The right PT pulse is 2+.     Left Foot/Ankle  Left Foot Inspection  Skin Exam: skin normal, skin intact and dry skin. No warmth, no erythema, no maceration, normal color, no pre-ulcer, no ulcer and no callus.     Toe Exam: ROM and strength within normal limits.     Sensory   Monofilament testing: intact    Vascular  Capillary refills: < 3 seconds  The left DP pulse is 2+. The left PT pulse is 2+.     Assign Risk Category  No deformity present  No loss of protective sensation  No weak pulses  Risk: 0  Name: Tracey Howard      : 1960      MRN: [id number]  Encounter Provider: Bianca Messer DO  Encounter Date: 3/25/2025   Encounter department: [facility]  :  Assessment & Plan  Right lower quadrant abdominal pain  Patient will have urine culture done Discussed this does not sound like diverticulitis However as she also per records has possible cervical cancer I feel she needs CT scan and GYN oncology followup will schedule If pain worsens or vomiting/diarrhea then ER   Orders:    CT abdomen pelvis wo contrast; Future    Hypertensive urgency  Continyue uiwt current BP meds norvasc and lisinopril add lopressor and 2 week followup Patient has no complaints regardin headache chest pain or palpitiations If she develops any of that then ER I discussed with her the signs of stroke  Orders:    metoprolol tartrate (LOPRESSOR) 25 mg tablet; Take 1 tablet (25 mg total) by mouth every 12 (twelve) hours    Malignant neoplasm of exocervix (HCC)  Refer back to specilaist I  reviewed her last visit with them the biopsy and the pap Patient and daugther agree to see GYN  Orders:    CT abdomen pelvis wo contrast; Future    Ambulatory Referral to Gynecologic Oncology; Future    Obesity, morbid ([facility])  Discussed healthy diet          Diabetic retinopathy associated with diabetes mellitus due to underlying condition, macular edema presence unspecified, unspecified laterality, unspecified retinopathy severity ([facility])  Discussed sugars not controlled and need for the eye doctor Patient to continue metformin I will see her in 2 weeks Patient needs to see endocrinology  Lab Results   Component Value Date    HGBA1C 8.1 (H) 03/01/2025       Orders:    Ambulatory Referral to Endocrinology; Future    Chronic renal failure, stage 3b ([facility])  Lab Results   Component Value Date    EGFR 43 03/01/2025    EGFR 44 04/16/2024    EGFR 48 04/22/2023    CREATININE 1.30 03/01/2025    CREATININE 1.28 04/16/2024    CREATININE 1.20 04/22/2023   GFR is stable continue current care with the lsiinopril and the norvasc Added the llopressor followup in 2 weeks avoid NSAIDS stay hydrated         Primary hypertension  Uncontrolled add lopressore contiue norvasc and lisinopril  Orders:    metoprolol tartrate (LOPRESSOR) 25 mg tablet; Take 1 tablet (25 mg total) by mouth every 12 (twelve) hours    Type 2 diabetes mellitus with hyperglycemia, without long-term current use of insulin ([facility])  Reveiwed poor control and metformin briefly discussed the GLP1  and insulin Will have her check sugars and record She will also see endocrinology and I will see her in 2 weeks   Lab Results   Component Value Date    HGBA1C 8.1 (H) 03/01/2025       Orders:    Ambulatory Referral to Endocrinology; Future           History of Present Illness   Patient is here with her daughter for right sided abdominal pain for 2 weeks Patient notes the pain comes and goes It is about 2 out of 10 Patient notes no urinary complaints No change in appetite or diet  "She has had diverticulitis in the past but this is not painful like that is Patient also has history of likely cervical cancer that has not been addressed Her blood pressure is very high today Her sugars are not controlled and she has declined several diabetic meds in the past She has no definite answers as to why she has declined further treatment other than \" I don't like taking meds\" She has not seen the endocrinology team either     Abdominal Pain  This is a new problem. Episode onset: 2 weeks. The onset quality is sudden. The problem occurs intermittently. The most recent episode lasted 2 weeks. The problem has been unchanged. The pain is located in the RLQ and suprapubic region. The pain is at a severity of 2/10. The pain is mild. The quality of the pain is aching. The abdominal pain does not radiate. Pertinent negatives include no anorexia, arthralgias, belching, constipation, diarrhea, dysuria, fever, flatus, frequency, headaches, hematochezia, hematuria, melena, myalgias, nausea, vomiting or weight loss. Nothing aggravates the pain. The pain is relieved by Nothing. She has tried nothing for the symptoms. Prior diagnostic workup includes GI consult and CT scan. There is no history of abdominal surgery, colon cancer, Crohn's disease, gallstones, GERD, irritable bowel syndrome, pancreatitis, PUD or ulcerative colitis.     Review of Systems   Constitutional:  Negative for fever and weight loss.   Gastrointestinal:  Positive for abdominal pain. Negative for anorexia, constipation, diarrhea, flatus, hematochezia, melena, nausea and vomiting.   Genitourinary:  Negative for dysuria, frequency and hematuria.   Musculoskeletal:  Negative for arthralgias and myalgias.   Neurological:  Negative for headaches.       Objective   BP (!) 180/96 (BP Location: Left arm, Patient Position: Sitting, Cuff Size: Standard)   Pulse (!) 109   Temp 98 °F (36.7 °C) (Temporal)   Ht 5' 1.5\" (1.562 m)   Wt 86.7 kg (191 lb 3.2 oz)   " LMP  (LMP Unknown)   SpO2 98%   BMI 35.54 kg/m²      Physical Exam  Cardiovascular:      Pulses: no weak pulses.           Dorsalis pedis pulses are 2+ on the right side and 2+ on the left side.        Posterior tibial pulses are 2+ on the right side and 2+ on the left side.   Feet:      Right foot:      Skin integrity: Dry skin present. No ulcer, skin breakdown, erythema, warmth or callus.      Left foot:      Skin integrity: Dry skin present. No ulcer, skin breakdown, erythema, warmth or callus.

## 2025-03-25 NOTE — ASSESSMENT & PLAN NOTE
Uncontrolled add lopressore contiue norvasc and lisinopril  Orders:    metoprolol tartrate (LOPRESSOR) 25 mg tablet; Take 1 tablet (25 mg total) by mouth every 12 (twelve) hours

## 2025-03-25 NOTE — ASSESSMENT & PLAN NOTE
Patient will have urine culture done Discussed this does not sound like diverticulitis However as she also per records has possible cervical cancer I feel she needs CT scan and GYN oncology followup will schedule If pain worsens or vomiting/diarrhea then ER   Orders:    CT abdomen pelvis wo contrast; Future

## 2025-03-26 ENCOUNTER — TELEPHONE (OUTPATIENT)
Age: 65
End: 2025-03-26

## 2025-03-26 ENCOUNTER — TELEPHONE (OUTPATIENT)
Dept: GYNECOLOGIC ONCOLOGY | Facility: CLINIC | Age: 65
End: 2025-03-26

## 2025-03-26 PROBLEM — N17.9 AKI (ACUTE KIDNEY INJURY) (HCC): Status: RESOLVED | Noted: 2021-12-28 | Resolved: 2025-03-26

## 2025-03-26 NOTE — TELEPHONE ENCOUNTER
I spoke with the patient and scheduled a 30 minute appointment with Dr. Browne for 4/1/2025 at 3:00PM in [place] to discuss surgery since the patient was lost to follow up since 5/5/2023.

## 2025-03-26 NOTE — TELEPHONE ENCOUNTER
Received call from patient's daughter Nery wanting to review results from a cervical biopsy that was performed on 4/21/23 by Dr. Browne.  States that patient has been recommended for a hysterectomy due to cervical cancer but daughter states that patient and her are unaware of this diagnosis.   Daughter would like a call back to advise. [phone]

## 2025-03-26 NOTE — TELEPHONE ENCOUNTER
Please let the patient's daughter know we cannot disclose any medical information, as she is not on the patient's communication consent. Thanks!

## 2025-03-26 NOTE — TELEPHONE ENCOUNTER
I called and spoke to the patient's daughter Nery she is on the patient's consent form and made her aware she does need to follow up with physcian who preformed the biopsy. She verbalized understanding and agreement.

## 2025-03-26 NOTE — TELEPHONE ENCOUNTER
I found the new communication consent (2024). You can send her the path from the LEEP procedure and let her know that her mother did NOT have a cancer, but a pre-cancer and needs follow-up.

## 2025-03-26 NOTE — TELEPHONE ENCOUNTER
Patient daughter calling in, stated that she would like the pathology reports sent to her email [email or web address] and for someone to give her a call to explain them to her, she stated she is not sure if her mother did have cancer and that she was told of this recently. Please give her a call back to discuss at [phone]

## 2025-03-26 NOTE — TELEPHONE ENCOUNTER
Called and spoke with patient's daughter.  Explained that per Ellyn, her mother did NOT have a cancer, but a pre-cancer and needs follow-up.  Patient daughter stated that they are coming in on 4/1 for a surgery discussion with Dr. Browne in [place] at 3 pm.  Patient is scheduled for a CT scan on 4/1 at 9:45 am at [facility].  Patient's daughter confirmed dates, times, instructions, and locations.

## 2025-03-26 NOTE — TELEPHONE ENCOUNTER
Received a phone call from patient's daughter, Nery.  Nery stated that she would like the pathology reports sent to her email [email or web address] and for someone to give her a call to explain them to her, she stated she is not sure if her mother did have cancer and that she was told of this recently. Please give her a call back to discuss at [phone].  Nery is on communication consent.  Please check in media tab.  There is a [facility] Communication Consent form dated 4/10/2024.

## 2025-03-26 NOTE — TELEPHONE ENCOUNTER
Patient's daughter, Nery (communication consent confirmed), regarding results of last pap smear and follow up that patient had with GYN onc. Provided details on patient's pap and warm transferred to [facility] at Gyn/Onc regarding follow up. Candyidjudah appreciative of assistance.

## 2025-03-27 ENCOUNTER — TELEPHONE (OUTPATIENT)
Age: 65
End: 2025-03-27

## 2025-03-27 NOTE — TELEPHONE ENCOUNTER
Diagnosis added 4/3/23.    Left message explaining this diagnosis was added 4/3/23 when patient saw [facility] [place]. This is when her pap showed cancerous cells. If she feels this is an inaccurate diagnosis she should speak with them. Dr. Messer is going off of the history in the chart. Also reason to follow up with them.

## 2025-03-27 NOTE — TELEPHONE ENCOUNTER
Patients daughter asking that she receive a call back around 1:30 or at the end of the day due to being at work right now.

## 2025-03-27 NOTE — TELEPHONE ENCOUNTER
"Patient's daughter calls to report that the patient was in a panic after her visit on Tuesday. Patient was really in a panic regarding the diagnosis of \"Malignant Neoplasm of Exocervix\" in the patient's chart. Caregiver-daughter-Nery (Nery is on the communication consent form) would like a call back at [phone]. Nery states that Dr. Messer is incorrect and that the patient has \"pre-cancerous\" cells . Family is upset that the patient is really panicekd at this time.   "

## 2025-04-01 ENCOUNTER — RESULTS FOLLOW-UP (OUTPATIENT)
Dept: FAMILY MEDICINE CLINIC | Facility: CLINIC | Age: 65
End: 2025-04-01

## 2025-04-01 ENCOUNTER — HOSPITAL ENCOUNTER (OUTPATIENT)
Dept: RADIOLOGY | Age: 65
Discharge: HOME | End: 2025-04-01
Payer: MEDICARE

## 2025-04-01 ENCOUNTER — OFFICE VISIT (OUTPATIENT)
Dept: GYNECOLOGIC ONCOLOGY | Facility: CLINIC | Age: 65
End: 2025-04-01
Payer: MEDICARE

## 2025-04-01 VITALS
RESPIRATION RATE: 18 BRPM | SYSTOLIC BLOOD PRESSURE: 178 MMHG | DIASTOLIC BLOOD PRESSURE: 88 MMHG | TEMPERATURE: 97.6 F | HEART RATE: 113 BPM | WEIGHT: 189.4 LBS | BODY MASS INDEX: 34.85 KG/M2 | OXYGEN SATURATION: 97 % | HEIGHT: 62 IN

## 2025-04-01 DIAGNOSIS — C53.1 MALIGNANT NEOPLASM OF EXOCERVIX (HCC): ICD-10-CM

## 2025-04-01 DIAGNOSIS — R10.31 RIGHT LOWER QUADRANT ABDOMINAL PAIN: ICD-10-CM

## 2025-04-01 DIAGNOSIS — R93.2 ABNORMAL CT SCAN, GALLBLADDER: Primary | ICD-10-CM

## 2025-04-01 DIAGNOSIS — R87.619 ABNORMAL CERVICAL CYTOLOGY: Primary | ICD-10-CM

## 2025-04-01 PROBLEM — R87.613 HSIL ON PAP SMEAR OF CERVIX: Status: RESOLVED | Noted: 2023-04-03 | Resolved: 2025-04-01

## 2025-04-01 PROCEDURE — 88344 IMHCHEM/IMCYTCHM EA MLT ANTB: CPT | Performed by: PATHOLOGY

## 2025-04-01 PROCEDURE — 99459 PELVIC EXAMINATION: CPT | Performed by: OBSTETRICS & GYNECOLOGY

## 2025-04-01 PROCEDURE — 74176 CT ABD & PELVIS W/O CONTRAST: CPT

## 2025-04-01 PROCEDURE — 58100 BIOPSY OF UTERUS LINING: CPT | Performed by: OBSTETRICS & GYNECOLOGY

## 2025-04-01 PROCEDURE — 99213 OFFICE O/P EST LOW 20 MIN: CPT | Performed by: OBSTETRICS & GYNECOLOGY

## 2025-04-01 PROCEDURE — 87624 HPV HI-RISK TYP POOLED RSLT: CPT | Performed by: OBSTETRICS & GYNECOLOGY

## 2025-04-01 PROCEDURE — 88305 TISSUE EXAM BY PATHOLOGIST: CPT | Performed by: PATHOLOGY

## 2025-04-01 PROCEDURE — 88175 CYTOPATH C/V AUTO FLUID REDO: CPT | Performed by: STUDENT IN AN ORGANIZED HEALTH CARE EDUCATION/TRAINING PROGRAM

## 2025-04-01 NOTE — ASSESSMENT & PLAN NOTE
I have independently obtained history from patient today.  Pelvic exam performed.    On gross examination there are no abnormalities.  After excisional procedure she has a pinpoint ectocervix with no gross lesions.  There is no evidence of vulvar or vaginal lesions either.  Vaginal atrophy noted.  I did obtain today Pap/HPV, endocervical curettage endometrial biopsy to rule out the remote possibility of persistent squamous cell carcinoma cells anywhere along the upper genitourinary system/cervix.    In the absence of abnormalities, we have agreed with ongoing observation.  Patient return to the office in 6 months for repeat Pap and ECC.  She will contact us sooner if she has any new symptoms.  Orders:    Ambulatory Referral to Gynecologic Oncology

## 2025-04-01 NOTE — TELEPHONE ENCOUNTER
----- Message from Bianca Messer DO sent at 4/1/2025 11:27 AM EDT -----  Call patient CT shows possible calcification of the gall bladder vs gall stones They want us to do a ultrasound I will order that Patient has appt with GYN If patient is having pain in the abdomen still I would suggest the ultrasound of the gallbladder and referral to surgery If no pain then just ultrasound

## 2025-04-01 NOTE — PROGRESS NOTES
Name: Tracey Howard      : 1960      MRN: [id number]  Encounter Provider: Tristen Browne MD  Encounter Date: 2025   Encounter department: [facility] [place]  :  Assessment & Plan  Abnormal cervical cytology  I have independently obtained history from patient today.  Pelvic exam performed.    On gross examination there are no abnormalities.  After excisional procedure she has a pinpoint ectocervix with no gross lesions.  There is no evidence of vulvar or vaginal lesions either.  Vaginal atrophy noted.  I did obtain today Pap/HPV, endocervical curettage endometrial biopsy to rule out the remote possibility of persistent squamous cell carcinoma cells anywhere along the upper genitourinary system/cervix.    In the absence of abnormalities, we have agreed with ongoing observation.  Patient return to the office in 6 months for repeat Pap and ECC.  She will contact us sooner if she has any new symptoms.  Orders:  •  Tissue Exam  •  Liquid-based pap, diagnostic  •  Tissue Exam  •  Endometrial biopsy            History of Present Illness   Reason for Visit / CC: Follow-up, patient was lost after no-show for hysterectomy in  recommended for squamous cell carcinoma cells on Pap.    Tracey Howard is a 64 y.o. female   Patient well-known to our service.  In  she had evidence of abnormal Pap with squamous cell carcinoma cells.  Underwent excisional procedure which demonstrated MARILU-3 with positive margins but no evidence of invasive carcinoma.  Case was discussed at multidisciplinary cancer conference and recommendation was to proceed with simple diagnostic hysterectomy.  Unfortunately, after scheduling this procedure she decided not to proceed and got lost to follow-up.  Presents today with her daughter to reestablish care.  Denies vaginal bleeding, drainage or discharge.  CT scan obtained today for different indication demonstrates no evidence of cervical masses or pelvic  "lymphadenopathy.      Pertinent Medical History   Christian with declines blood transfusions, hypertension, type 2 diabetes with endorgan damage on insulin, stage IIIb chronic kidney disease, abdominal pain, obesity         Review of Systems A complete review of systems is negative other than that noted above in the HPI.       Objective   BP (!) 178/88 (BP Location: Left arm, Patient Position: Sitting, Cuff Size: Adult)   Pulse (!) 113   Temp 97.6 °F (36.4 °C)   Resp 18   Ht 5' 1.5\" (1.562 m)   Wt 85.9 kg (189 lb 6.4 oz)   LMP  (LMP Unknown)   SpO2 97%   BMI 35.21 kg/m²     Body mass index is 35.21 kg/m².  Pain Screening:  Pain Score: 0-No pain  ECOG ECOG Performance Status: 0 - Fully active, able to carry on all pre-disease performance without restriction   Physical Exam  Vitals reviewed.   Genitourinary:     Comments: Normal external female genitalia. Normal Bartholin's and Belmond's glands. Normal urethral meatus and no evidence of urethral discharge or masses.  Bladder without fullness mass or tenderness. Vagina without lesion or discharge. No significant pelvic organ prolapse noted. Cervix with minimal residual vaginal portio, status post excisional procedure, grossly normal appearing without visible lesions.  Point ectocervix noted.  Pap obtained, ectocervix/os dilated and ECC/endometrial biopsy obtained.  Uterus nontender with normal contour, size and mobility. Adnexae without mass or tenderness. Anus without fissure of lesion.             Endometrial biopsy    Date/Time: 4/1/2025 3:00 PM    Performed by: Tristen Browne MD  Authorized by: Tristen Browne MD  Universal Protocol:  Consent: Verbal consent obtained.  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  Sent separately.    Tristen Browne MD, JAMES, FACOG, FACS  4/1/2025  3:34 PM

## 2025-04-01 NOTE — ASSESSMENT & PLAN NOTE
I have independently obtained history from patient today.  Pelvic exam performed.    On gross examination there are no abnormalities.  After excisional procedure she has a pinpoint ectocervix with no gross lesions.  There is no evidence of vulvar or vaginal lesions either.  Vaginal atrophy noted.  I did obtain today Pap/HPV, endocervical curettage endometrial biopsy to rule out the remote possibility of persistent squamous cell carcinoma cells anywhere along the upper genitourinary system/cervix.    In the absence of abnormalities, we have agreed with ongoing observation.  Patient return to the office in 6 months for repeat Pap and ECC.  She will contact us sooner if she has any new symptoms.  Orders:  •  Tissue Exam  •  Liquid-based pap, diagnostic  •  Tissue Exam  •  Endometrial biopsy

## 2025-04-07 LAB
LAB AP GYN PRIMARY INTERPRETATION: ABNORMAL
Lab: ABNORMAL
PATH INTERP SPEC-IMP: ABNORMAL

## 2025-04-07 PROCEDURE — 88141 CYTOPATH C/V INTERPRET: CPT | Performed by: STUDENT IN AN ORGANIZED HEALTH CARE EDUCATION/TRAINING PROGRAM

## 2025-04-09 ENCOUNTER — HOSPITAL ENCOUNTER (OUTPATIENT)
Dept: RADIOLOGY | Age: 65
Discharge: HOME | End: 2025-04-09
Payer: MEDICARE

## 2025-04-09 DIAGNOSIS — R93.2 ABNORMAL CT SCAN, GALLBLADDER: ICD-10-CM

## 2025-04-09 PROCEDURE — 76705 ECHO EXAM OF ABDOMEN: CPT

## 2025-04-10 ENCOUNTER — RESULTS FOLLOW-UP (OUTPATIENT)
Dept: FAMILY MEDICINE CLINIC | Facility: CLINIC | Age: 65
End: 2025-04-10

## 2025-04-10 PROCEDURE — 88344 IMHCHEM/IMCYTCHM EA MLT ANTB: CPT | Performed by: PATHOLOGY

## 2025-04-10 PROCEDURE — 88305 TISSUE EXAM BY PATHOLOGIST: CPT | Performed by: PATHOLOGY

## 2025-04-10 NOTE — TELEPHONE ENCOUNTER
----- Message from Bianca Messer DO sent at 4/10/2025 12:21 PM EDT -----  Call patient ultrasound shows some gall stones but no inflammation of the gall bladder Continue current care and followup as scheduled

## 2025-04-15 ENCOUNTER — OFFICE VISIT (OUTPATIENT)
Dept: FAMILY MEDICINE CLINIC | Facility: CLINIC | Age: 65
End: 2025-04-15
Payer: MEDICARE

## 2025-04-15 VITALS
BODY MASS INDEX: 33.49 KG/M2 | HEART RATE: 92 BPM | HEIGHT: 62 IN | DIASTOLIC BLOOD PRESSURE: 90 MMHG | SYSTOLIC BLOOD PRESSURE: 150 MMHG | WEIGHT: 182 LBS | TEMPERATURE: 97.6 F | OXYGEN SATURATION: 97 %

## 2025-04-15 DIAGNOSIS — E11.9 TYPE 2 DIABETES MELLITUS WITHOUT COMPLICATION, WITHOUT LONG-TERM CURRENT USE OF INSULIN (HCC): ICD-10-CM

## 2025-04-15 DIAGNOSIS — E11.65 TYPE 2 DIABETES MELLITUS WITH HYPERGLYCEMIA, WITHOUT LONG-TERM CURRENT USE OF INSULIN (HCC): ICD-10-CM

## 2025-04-15 DIAGNOSIS — Z12.31 ENCOUNTER FOR SCREENING MAMMOGRAM FOR BREAST CANCER: ICD-10-CM

## 2025-04-15 DIAGNOSIS — I16.0 HYPERTENSIVE URGENCY: ICD-10-CM

## 2025-04-15 DIAGNOSIS — E08.319 DIABETIC RETINOPATHY ASSOCIATED WITH DIABETES MELLITUS DUE TO UNDERLYING CONDITION, MACULAR EDEMA PRESENCE UNSPECIFIED, UNSPECIFIED LATERALITY, UNSPECIFIED RETINOPATHY SEVERITY (HCC): ICD-10-CM

## 2025-04-15 DIAGNOSIS — I10 PRIMARY HYPERTENSION: ICD-10-CM

## 2025-04-15 DIAGNOSIS — Z00.00 MEDICARE ANNUAL WELLNESS VISIT, INITIAL: Primary | ICD-10-CM

## 2025-04-15 DIAGNOSIS — N18.32 CHRONIC RENAL FAILURE, STAGE 3B (HCC): ICD-10-CM

## 2025-04-15 DIAGNOSIS — Z78.0 POST-MENOPAUSAL: ICD-10-CM

## 2025-04-15 PROBLEM — R10.31 RIGHT LOWER QUADRANT ABDOMINAL PAIN: Status: RESOLVED | Noted: 2025-03-25 | Resolved: 2025-04-15

## 2025-04-15 PROBLEM — E66.01 OBESITY, MORBID (HCC): Status: RESOLVED | Noted: 2023-04-10 | Resolved: 2025-04-15

## 2025-04-15 PROCEDURE — G2211 COMPLEX E/M VISIT ADD ON: HCPCS | Performed by: FAMILY MEDICINE

## 2025-04-15 PROCEDURE — 99214 OFFICE O/P EST MOD 30 MIN: CPT | Performed by: FAMILY MEDICINE

## 2025-04-15 PROCEDURE — G0402 INITIAL PREVENTIVE EXAM: HCPCS | Performed by: FAMILY MEDICINE

## 2025-04-15 PROCEDURE — G0403 EKG FOR INITIAL PREVENT EXAM: HCPCS | Performed by: FAMILY MEDICINE

## 2025-04-15 RX ORDER — LISINOPRIL 40 MG/1
40 TABLET ORAL DAILY
Qty: 90 TABLET | Refills: 1 | Status: CANCELLED | OUTPATIENT
Start: 2025-04-15

## 2025-04-15 RX ORDER — AMLODIPINE AND VALSARTAN 10; 160 MG/1; MG/1
1 TABLET ORAL DAILY
Qty: 30 TABLET | Refills: 5 | Status: SHIPPED | OUTPATIENT
Start: 2025-04-15

## 2025-04-15 RX ORDER — AMLODIPINE BESYLATE 10 MG/1
10 TABLET ORAL DAILY
Qty: 90 TABLET | Refills: 0 | Status: CANCELLED | OUTPATIENT
Start: 2025-04-15

## 2025-04-15 NOTE — PATIENT INSTRUCTIONS
Medicare Preventive Visit Patient Instructions  Thank you for completing your Welcome to Medicare Visit or Medicare Annual Wellness Visit today. Your next wellness visit will be due in one year (4/16/2026).  The screening/preventive services that you may require over the next 5-10 years are detailed below. Some tests may not apply to you based off risk factors and/or age. Screening tests ordered at today's visit but not completed yet may show as past due. Also, please note that scanned in results may not display below.  Preventive Screenings:  Service Recommendations Previous Testing/Comments   Colorectal Cancer Screening  * Colonoscopy    * Fecal Occult Blood Test (FOBT)/Fecal Immunochemical Test (FIT)  * Fecal DNA/Cologuard Test  * Flexible Sigmoidoscopy Age: 45-75 years old   Colonoscopy: every 10 years (may be performed more frequently if at higher risk)  OR  FOBT/FIT: every 1 year  OR  Cologuard: every 3 years  OR  Sigmoidoscopy: every 5 years  Screening may be recommended earlier than age 45 if at higher risk for colorectal cancer. Also, an individualized decision between you and your healthcare provider will decide whether screening between the ages of 76-85 would be appropriate. Colonoscopy: 07/23/2024  FOBT/FIT: Not on file  Cologuard: Not on file  Sigmoidoscopy: Not on file    Screening Current     Breast Cancer Screening Age: 40+ years old  Frequency: every 1-2 years  Not required if history of left and right mastectomy Mammogram: Not on file        Cervical Cancer Screening Between the ages of 21-29, pap smear recommended once every 3 years.   Between the ages of 30-65, can perform pap smear with HPV co-testing every 5 years.   Recommendations may differ for women with a history of total hysterectomy, cervical cancer, or abnormal pap smears in past. Pap Smear: 04/07/2025    History Cervical Cancer   Hepatitis C Screening Once for adults born between 1945 and 1965  More frequently in patients at high risk  for Hepatitis C Hep C Antibody: 03/21/2023    Screening Current   Diabetes Screening 1-2 times per year if you're at risk for diabetes or have pre-diabetes Fasting glucose: 197 mg/dL (3/1/2025)  A1C: 8.1 % (3/1/2025)  Screening Not Indicated  History Diabetes   Cholesterol Screening Once every 5 years if you don't have a lipid disorder. May order more often based on risk factors. Lipid panel: 03/01/2025    Screening Current     Other Preventive Screenings Covered by Medicare:  Abdominal Aortic Aneurysm (AAA) Screening: covered once if your at risk. You're considered to be at risk if you have a family history of AAA.  Lung Cancer Screening: covers low dose CT scan once per year if you meet all of the following conditions: (1) Age 55-77; (2) No signs or symptoms of lung cancer; (3) Current smoker or have quit smoking within the last 15 years; (4) You have a tobacco smoking history of at least 20 pack years (packs per day multiplied by number of years you smoked); (5) You get a written order from a healthcare provider.  Glaucoma Screening: covered annually if you're considered high risk: (1) You have diabetes OR (2) Family history of glaucoma OR (3)  aged 50 and older OR (4)  American aged 65 and older  Osteoporosis Screening: covered every 2 years if you meet one of the following conditions: (1) You're estrogen deficient and at risk for osteoporosis based off medical history and other findings; (2) Have a vertebral abnormality; (3) On glucocorticoid therapy for more than 3 months; (4) Have primary hyperparathyroidism; (5) On osteoporosis medications and need to assess response to drug therapy.   Last bone density test (DXA Scan): Not on file.  HIV Screening: covered annually if you're between the age of 15-65. Also covered annually if you are younger than 15 and older than 65 with risk factors for HIV infection. For pregnant patients, it is covered up to 3 times per  pregnancy.    Immunizations:  Immunization Recommendations   Influenza Vaccine Annual influenza vaccination during flu season is recommended for all persons aged >= 6 months who do not have contraindications   Pneumococcal Vaccine   * Pneumococcal conjugate vaccine = PCV13 (Prevnar 13), PCV15 (Vaxneuvance), PCV20 (Prevnar 20)  * Pneumococcal polysaccharide vaccine = PPSV23 (Pneumovax) Adults 19-63 yo with certain risk factors or if 65+ yo  If never received any pneumonia vaccine: recommend Prevnar 20 (PCV20)  Give PCV20 if previously received 1 dose of PCV13 or PPSV23   Hepatitis B Vaccine 3 dose series if at intermediate or high risk (ex: diabetes, end stage renal disease, liver disease)   Respiratory syncytial virus (RSV) Vaccine - COVERED BY MEDICARE PART D  * RSVPreF3 (Arexvy) CDC recommends that adults 60 years of age and older may receive a single dose of RSV vaccine using shared clinical decision-making (SCDM)   Tetanus (Td) Vaccine - COST NOT COVERED BY MEDICARE PART B Following completion of primary series, a booster dose should be given every 10 years to maintain immunity against tetanus. Td may also be given as tetanus wound prophylaxis.   Tdap Vaccine - COST NOT COVERED BY MEDICARE PART B Recommended at least once for all adults. For pregnant patients, recommended with each pregnancy.   Shingles Vaccine (Shingrix) - COST NOT COVERED BY MEDICARE PART B  2 shot series recommended in those 19 years and older who have or will have weakened immune systems or those 50 years and older     Health Maintenance Due:      Topic Date Due   • Breast Cancer Screening: Mammogram  Never done   • Colorectal Cancer Screening  07/22/2031   • HIV Screening  Completed   • Hepatitis C Screening  Completed     Immunizations Due:  There are no preventive care reminders to display for this patient.  Advance Directives   What are advance directives?  Advance directives are legal documents that state your wishes and plans for  medical care. These plans are made ahead of time in case you lose your ability to make decisions for yourself. Advance directives can apply to any medical decision, such as the treatments you want, and if you want to donate organs.   What are the types of advance directives?  There are many types of advance directives, and each state has rules about how to use them. You may choose a combination of any of the following:  Living will:  This is a written record of the treatment you want. You can also choose which treatments you do not want, which to limit, and which to stop at a certain time. This includes surgery, medicine, IV fluid, and tube feedings.   Durable power of  for healthcare (DPAHC):  This is a written record that states who you want to make healthcare choices for you when you are unable to make them for yourself. This person, called a proxy, is usually a family member or a friend. You may choose more than 1 proxy.  Do not resuscitate (DNR) order:  A DNR order is used in case your heart stops beating or you stop breathing. It is a request not to have certain forms of treatment, such as CPR. A DNR order may be included in other types of advance directives.  Medical directive:  This covers the care that you want if you are in a coma, near death, or unable to make decisions for yourself. You can list the treatments you want for each condition. Treatment may include pain medicine, surgery, blood transfusions, dialysis, IV or tube feedings, and a ventilator (breathing machine).  Values history:  This document has questions about your views, beliefs, and how you feel and think about life. This information can help others choose the care that you would choose.  Why are advance directives important?  An advance directive helps you control your care. Although spoken wishes may be used, it is better to have your wishes written down. Spoken wishes can be misunderstood, or not followed. Treatments may be given  even if you do not want them. An advance directive may make it easier for your family to make difficult choices about your care.   Weight Management   Why it is important to manage your weight:  Being overweight increases your risk of health conditions such as heart disease, high blood pressure, type 2 diabetes, and certain types of cancer. It can also increase your risk for osteoarthritis, sleep apnea, and other respiratory problems. Aim for a slow, steady weight loss. Even a small amount of weight loss can lower your risk of health problems.  How to lose weight safely:  A safe and healthy way to lose weight is to eat fewer calories and get regular exercise. You can lose up about 1 pound a week by decreasing the number of calories you eat by 500 calories each day.   Healthy meal plan for weight management:  A healthy meal plan includes a variety of foods, contains fewer calories, and helps you stay healthy. A healthy meal plan includes the following:  Eat whole-grain foods more often.  A healthy meal plan should contain fiber. Fiber is the part of grains, fruits, and vegetables that is not broken down by your body. Whole-grain foods are healthy and provide extra fiber in your diet. Some examples of whole-grain foods are whole-wheat breads and pastas, oatmeal, brown rice, and bulgur.  Eat a variety of vegetables every day.  Include dark, leafy greens such as spinach, kale, atnya greens, and mustard greens. Eat yellow and orange vegetables such as carrots, sweet potatoes, and winter squash.   Eat a variety of fruits every day.  Choose fresh or canned fruit (canned in its own juice or light syrup) instead of juice. Fruit juice has very little or no fiber.  Eat low-fat dairy foods.  Drink fat-free (skim) milk or 1% milk. Eat fat-free yogurt and low-fat cottage cheese. Try low-fat cheeses such as mozzarella and other reduced-fat cheeses.  Choose meat and other protein foods that are low in fat.  Choose beans or other  legumes such as split peas or lentils. Choose fish, skinless poultry (chicken or turkey), or lean cuts of red meat (beef or pork). Before you cook meat or poultry, cut off any visible fat.   Use less fat and oil.  Try baking foods instead of frying them. Add less fat, such as margarine, sour cream, regular salad dressing and mayonnaise to foods. Eat fewer high-fat foods. Some examples of high-fat foods include french fries, doughnuts, ice cream, and cakes.  Eat fewer sweets.  Limit foods and drinks that are high in sugar. This includes candy, cookies, regular soda, and sweetened drinks.  Exercise:  Exercise at least 30 minutes per day on most days of the week. Some examples of exercise include walking, biking, dancing, and swimming. You can also fit in more physical activity by taking the stairs instead of the elevator or parking farther away from stores. Ask your healthcare provider about the best exercise plan for you.      © Copyright [employer] 2018 Information is for End User's use only and may not be sold, redistributed or otherwise used for commercial purposes. All illustrations and images included in CareNotes® are the copyrighted property of [employer] or [employer]

## 2025-04-15 NOTE — ASSESSMENT & PLAN NOTE
Continue metformin discussed diet had eye exam and has appt with endocirnology   Lab Results   Component Value Date    HGBA1C 8.1 (H) 03/01/2025

## 2025-04-15 NOTE — ASSESSMENT & PLAN NOTE
Lab Results   Component Value Date    EGFR 43 03/01/2025    EGFR 44 04/16/2024    EGFR 48 04/22/2023    CREATININE 1.30 03/01/2025    CREATININE 1.28 04/16/2024    CREATININE 1.20 04/22/2023   Renal function is stable Patient needs better control of diabetes and blood pressure nurse BP check in 2 weeks bring home cuff with

## 2025-04-15 NOTE — PROGRESS NOTES
Name: Tracey Howard      : 1960      MRN: [id number]  Encounter Provider: Bianca Messer DO  Encounter Date: 4/15/2025   Encounter department: [facility]  :  Assessment & Plan  Medicare annual wellness visit, initial  Patient refuses shots Her eye and her ECG were done today Patient refuses to have immunizations at this time Patient mammogram and dexa were ordered advanced directives were given to patient  Orders:    POCT ECG    Visual acuity screening    Encounter for screening mammogram for breast cancer    Orders:    Mammo screening bilateral w 3d and cad; Future    Post-menopausal    Orders:    DXA bone density spine hip and pelvis; Future    Type 2 diabetes mellitus without complication, without long-term current use of insulin (HCC)  Has endocrinology appt scheduled continue metformin for now Patient also needs to check sugars twice daily and   Lab Results   Component Value Date    HGBA1C 8.1 (H) 2025       Orders:    metFORMIN (GLUCOPHAGE) 1000 MG tablet; Take 1 tablet (1,000 mg total) by mouth 2 (two) times a day with meals    Primary hypertension  Refuses to take metoprolol as she felt dizzy on it Patient and daughter want to try combination medication as they think less pills may be better Patient also has had family members do well on taht medication She will get a blood pressure cuff and check them at home I will have her have nurse BP check in 2 weeks  Orders:    amLODIPine-valsartan (EXFORGE)  MG per tablet; Take 1 tablet by mouth daily    Type 2 diabetes mellitus with hyperglycemia, without long-term current use of insulin (HCC)  Continue metformin discussed diet had eye exam and has appt with endocirnology   Lab Results   Component Value Date    HGBA1C 8.1 (H) 2025            Hypertensive urgency    Orders:    amLODIPine-valsartan (EXFORGE)  MG per tablet; Take 1 tablet by mouth daily    Diabetic retinopathy associated with diabetes  mellitus due to underlying condition, macular edema presence unspecified, unspecified laterality, unspecified retinopathy severity (HCC)  Continue to see eye doctor followup with endocrinology   Lab Results   Component Value Date    HGBA1C 8.1 (H) 03/01/2025            Chronic renal failure, stage 3b (HCC)  Lab Results   Component Value Date    EGFR 43 03/01/2025    EGFR 44 04/16/2024    EGFR 48 04/22/2023    CREATININE 1.30 03/01/2025    CREATININE 1.28 04/16/2024    CREATININE 1.20 04/22/2023   Renal function is stable Patient needs better control of diabetes and blood pressure nurse BP check in 2 weeks bring home cuff with          BMI 33.0-33.9,adult  BMI Counseling: Body mass index is 33.83 kg/m². The BMI is above normal. Nutrition recommendations include reducing portion sizes, decreasing overall calorie intake, 3-5 servings of fruits/vegetables daily, and moderation in carbohydrate intake. Exercise recommendations include moderate aerobic physical activity for 150 minutes/week.            Preventive health issues were discussed with patient, and age appropriate screening tests were ordered as noted in patient's After Visit Summary. Personalized health advice and appropriate referrals for health education or preventive services given if needed, as noted in patient's After Visit Summary.    History of Present Illness     Patient is here with her daughter for medicare wellness and followup of hypertension with history of hypertensive urgency type 2 diabetes with hyperglycemia stage 3 renal disease and retinopathy and obesity She was started on metoprolol 25 mg BID She did not tolerate that she took it for 2 days nad was too dizzy Patient is taking norvasc and lisinopril However did not take this AM as she takes with food and did not eat Patient sugar was 120 last time she checked which was some time last week Patient A1c in March was much higher Patient has appt with endocrinology She no longer has any belly  pain She was seen by GYN and will be following up Patient feels fine and has no complaints Patient daughter has questions about the chronic renal disease I can track it to 2021 Hwoever not sure if it was there prior  as no records available However I can say it has stayed stable Stressed to patient and family that BP control must be achieved and good sugar control        Patient Care Team:  Bianca Messer DO as PCP - General (Family Medicine)  Chasity Gonzalez MD as PCP - Endocrinology (Endocrinology)  Sobia Bui DO as PCP - PCP-[facility] ([facility])  Garrison Valladares MD (Nephrology)  FERN Reynaga as Nurse Practitioner (Endocrinology)    Review of Systems   Constitutional:  Negative for fatigue, fever and unexpected weight change.   HENT:  Negative for congestion, sinus pain and trouble swallowing.    Eyes:  Negative for discharge and visual disturbance.   Respiratory:  Negative for cough, chest tightness, shortness of breath and wheezing.    Cardiovascular:  Negative for chest pain, palpitations and leg swelling.   Gastrointestinal:  Negative for abdominal pain, blood in stool, constipation, diarrhea, nausea and vomiting.   Genitourinary:  Negative for difficulty urinating, dysuria, frequency and hematuria.   Musculoskeletal:  Negative for arthralgias, gait problem and joint swelling.   Skin:  Negative for rash and wound.   Allergic/Immunologic: Negative for environmental allergies and food allergies.   Neurological:  Negative for dizziness, syncope, weakness, numbness and headaches.   Hematological:  Negative for adenopathy. Does not bruise/bleed easily.   Psychiatric/Behavioral:  Negative for confusion, decreased concentration and sleep disturbance. The patient is not nervous/anxious.      Medical History Reviewed by provider this encounter:  Tobacco  Allergies  Meds  Problems  Med Hx  Surg Hx  Fam Hx       Annual Wellness Visit Questionnaire   Tracey is here for her Initial  Wellness visit.     Health Risk Assessment:   Patient rates overall health as good. Patient feels that their physical health rating is same. Patient is very satisfied with their life. Eyesight was rated as same. Hearing was rated as same. Patient feels that their emotional and mental health rating is same. Patients states they are never, rarely angry. Patient states they are never, rarely unusually tired/fatigued. Pain experienced in the last 7 days has been none. Patient states that she has experienced no weight loss or gain in last 6 months.     Depression Screening:   PHQ-2 Score: 0      Fall Risk Screening:   In the past year, patient has experienced: no history of falling in past year      Urinary Incontinence Screening:   Patient has not leaked urine accidently in the last six months.     Home Safety:  Patient does not have trouble with stairs inside or outside of their home. Patient has working smoke alarms and has working carbon monoxide detector. Home safety hazards include: none.     Nutrition:   Current diet is Regular.     Medications:   Patient is currently taking over-the-counter supplements. OTC medications include: see medication list. Patient is able to manage medications.     Activities of Daily Living (ADLs)/Instrumental Activities of Daily Living (IADLs):   Walk and transfer into and out of bed and chair?: Yes  Dress and groom yourself?: Yes    Bathe or shower yourself?: Yes    Feed yourself? Yes  Do your laundry/housekeeping?: Yes  Manage your money, pay your bills and track your expenses?: Yes  Make your own meals?: Yes    Do your own shopping?: Yes    Previous Hospitalizations:   Any hospitalizations or ED visits within the last 12 months?: No      Advance Care Planning:   Living will: No    Durable POA for healthcare: No    ACP document given: Yes      Cognitive Screening:   Provider or family/friend/caregiver concerned regarding cognition?: No    Preventive Screenings      Cardiovascular  Screening:    General: Screening Current      Diabetes Screening:     General: Screening Not Indicated and History Diabetes      Colorectal Cancer Screening:     General: Screening Current      Cervical Cancer Screening:    General: History Cervical Cancer      Lung Cancer Screening:     General: Screening Not Indicated      Hepatitis C Screening:    General: Screening Current    Immunizations:  - Immunizations due: Influenza, Prevnar 20 and Zoster (Shingrix)    Screening, Brief Intervention, and Referral to Treatment (SBIRT)     Screening  Typical number of drinks in a day: 0  Typical number of drinks in a week: 0  Interpretation: Low risk drinking behavior.    AUDIT-C Screenin) How often did you have a drink containing alcohol in the past year? never  2) How many drinks did you have on a typical day when you were drinking in the past year? 0  3) How often did you have 6 or more drinks on one occasion in the past year? never    AUDIT-C Score: 0  Interpretation: Score 0-2 (female): Negative screen for alcohol misuse    Single Item Drug Screening:  How often have you used an illegal drug (including marijuana) or a prescription medication for non-medical reasons in the past year? never    Single Item Drug Screen Score: 0  Interpretation: Negative screen for possible drug use disorder    Social Drivers of Health     Financial Resource Strain: Low Risk  (3/11/2024)    Received from [employer] [employer]    Overall Financial Resource Strain (CARDIA)     Difficulty of Paying Living Expenses: Not hard at all   Food Insecurity: No Food Insecurity (4/15/2025)    Hunger Vital Sign     Worried About Running Out of Food in the Last Year: Never true     Ran Out of Food in the Last Year: Never true   Transportation Needs: No Transportation Needs (4/15/2025)    PRAPARE - Transportation     Lack of Transportation (Medical): No     Lack of Transportation (Non-Medical): No   Housing Stability: Low Risk  (4/15/2025)     "Housing Stability Vital Sign     Unable to Pay for Housing in the Last Year: No     Number of Times Moved in the Last Year: 1     Homeless in the Last Year: No   Utilities: Not At Risk (4/15/2025)    [facility] Utilities     Threatened with loss of utilities: No     No results found.    Objective   /90 (BP Location: Left arm, Patient Position: Sitting) Comment: pt did not take BP medicine  Pulse 92   Temp 97.6 °F (36.4 °C) (Temporal)   Ht 5' 1.5\" (1.562 m)   Wt 82.6 kg (182 lb)   LMP  (LMP Unknown)   SpO2 97%   BMI 33.83 kg/m²     Physical Exam  Vitals and nursing note reviewed.   Constitutional:       Appearance: She is obese.   HENT:      Right Ear: Tympanic membrane and external ear normal.      Left Ear: Tympanic membrane and external ear normal.      Nose: Nose normal.      Mouth/Throat:      Mouth: Mucous membranes are moist.      Pharynx: No oropharyngeal exudate or posterior oropharyngeal erythema.   Eyes:      Extraocular Movements: Extraocular movements intact.      Conjunctiva/sclera: Conjunctivae normal.      Pupils: Pupils are equal, round, and reactive to light.   Cardiovascular:      Rate and Rhythm: Normal rate and regular rhythm.      Heart sounds: Normal heart sounds.   Pulmonary:      Effort: Pulmonary effort is normal.      Breath sounds: Normal breath sounds.   Neurological:      General: No focal deficit present.      Mental Status: She is alert and oriented to person, place, and time.   Psychiatric:         Mood and Affect: Mood normal.         Behavior: Behavior normal.         "

## 2025-04-15 NOTE — ASSESSMENT & PLAN NOTE
BMI Counseling: Body mass index is 33.83 kg/m². The BMI is above normal. Nutrition recommendations include reducing portion sizes, decreasing overall calorie intake, 3-5 servings of fruits/vegetables daily, and moderation in carbohydrate intake. Exercise recommendations include moderate aerobic physical activity for 150 minutes/week.

## 2025-04-15 NOTE — ASSESSMENT & PLAN NOTE
Refuses to take metoprolol as she felt dizzy on it Patient and daughter want to try combination medication as they think less pills may be better Patient also has had family members do well on taht medication She will get a blood pressure cuff and check them at home I will have her have nurse BP check in 2 weeks  Orders:    amLODIPine-valsartan (EXFORGE)  MG per tablet; Take 1 tablet by mouth daily

## 2025-04-15 NOTE — ASSESSMENT & PLAN NOTE
Continue to see eye doctor followup with endocrinology   Lab Results   Component Value Date    HGBA1C 8.1 (H) 03/01/2025

## 2025-04-15 NOTE — ASSESSMENT & PLAN NOTE
Patient refuses shots Her eye and her ECG were done today Patient refuses to have immunizations at this time Patient mammogram and dexa were ordered advanced directives were given to patient  Orders:    POCT ECG    Visual acuity screening

## 2025-04-22 ENCOUNTER — OFFICE VISIT (OUTPATIENT)
Dept: GYNECOLOGIC ONCOLOGY | Facility: CLINIC | Age: 65
End: 2025-04-22
Payer: MEDICARE

## 2025-04-22 ENCOUNTER — TELEPHONE (OUTPATIENT)
Dept: GYNECOLOGIC ONCOLOGY | Facility: CLINIC | Age: 65
End: 2025-04-22

## 2025-04-22 VITALS
WEIGHT: 185.8 LBS | OXYGEN SATURATION: 99 % | TEMPERATURE: 97.3 F | DIASTOLIC BLOOD PRESSURE: 90 MMHG | HEART RATE: 84 BPM | RESPIRATION RATE: 18 BRPM | SYSTOLIC BLOOD PRESSURE: 160 MMHG | HEIGHT: 62 IN | BODY MASS INDEX: 34.19 KG/M2

## 2025-04-22 DIAGNOSIS — R87.619 ABNORMAL CERVICAL CYTOLOGY: Primary | ICD-10-CM

## 2025-04-22 DIAGNOSIS — E11.65 TYPE 2 DIABETES MELLITUS WITH HYPERGLYCEMIA, WITHOUT LONG-TERM CURRENT USE OF INSULIN (HCC): ICD-10-CM

## 2025-04-22 DIAGNOSIS — I10 PRIMARY HYPERTENSION: ICD-10-CM

## 2025-04-22 PROBLEM — Z00.00 MEDICARE ANNUAL WELLNESS VISIT, INITIAL: Status: RESOLVED | Noted: 2025-04-15 | Resolved: 2025-04-22

## 2025-04-22 PROBLEM — Z00.00 ANNUAL PHYSICAL EXAM: Status: RESOLVED | Noted: 2024-08-20 | Resolved: 2025-04-22

## 2025-04-22 PROBLEM — Z12.31 ENCOUNTER FOR SCREENING MAMMOGRAM FOR BREAST CANCER: Status: RESOLVED | Noted: 2025-04-15 | Resolved: 2025-04-22

## 2025-04-22 PROBLEM — Z78.0 POST-MENOPAUSAL: Status: RESOLVED | Noted: 2025-04-15 | Resolved: 2025-04-22

## 2025-04-22 PROCEDURE — 99215 OFFICE O/P EST HI 40 MIN: CPT | Performed by: OBSTETRICS & GYNECOLOGY

## 2025-04-22 PROCEDURE — G2211 COMPLEX E/M VISIT ADD ON: HCPCS | Performed by: OBSTETRICS & GYNECOLOGY

## 2025-04-22 RX ORDER — HEPARIN SODIUM 5000 [USP'U]/ML
5000 INJECTION, SOLUTION INTRAVENOUS; SUBCUTANEOUS
OUTPATIENT
Start: 2025-04-22 | End: 2025-04-23

## 2025-04-22 RX ORDER — SODIUM CHLORIDE, SODIUM LACTATE, POTASSIUM CHLORIDE, CALCIUM CHLORIDE 600; 310; 30; 20 MG/100ML; MG/100ML; MG/100ML; MG/100ML
125 INJECTION, SOLUTION INTRAVENOUS CONTINUOUS
OUTPATIENT
Start: 2025-04-22

## 2025-04-22 RX ORDER — ACETAMINOPHEN 325 MG/1
975 TABLET ORAL ONCE
OUTPATIENT
Start: 2025-04-22 | End: 2025-04-22

## 2025-04-22 NOTE — ASSESSMENT & PLAN NOTE
On amlodipine valsartan combination.  Will instruct patient to hold medication 24 hours prior to surgery (prior hypertensive urgency, avoid withholding for long period of time) in order to avoid MARQUITA.

## 2025-04-22 NOTE — TELEPHONE ENCOUNTER
Phoned patient, SMITA for patient to return my call to discuss surgery scheduling.  Offered patient date of surgery of 5/9/2025.    Call back number provided - [phone].

## 2025-04-22 NOTE — ASSESSMENT & PLAN NOTE
Lab Results   Component Value Date    HGBA1C 8.1 (H) 03/01/2025     Unfortunately, given concern for potential malignancy and patient being previously lost to follow-up we will not defer surgery while awaiting for glycemic optimization.  Have counseled patient today about dietary modifications and strict compliance with diabetes meds.  Will consult endocrinology for perioperative optimization.  Patient may need to stay in house at least overnight for insulin management/endocrine consult.

## 2025-04-22 NOTE — PROGRESS NOTES
Name: Tracey Howard      : 1960      MRN: [id number]  Encounter Provider: Tristen Browne MD  Encounter Date: 2025   Encounter department: [facility] [place]  :  Assessment & Plan  Abnormal cervical cytology  I have independently obtained history from patient today.  I have reviewed results from prior and most current pathologic assessment studies including Pap smears, cervical biopsies/excisional biopsy results.    Patient had evidence of carcinoma cells on Pap with subsequent excisional procedure demonstrating no evidence of abnormalities.  Unfortunately she was lost to follow-up.  Now with Pap demonstrating HSIL and fractional biopsies of endocervix and endometrium demonstrating high-grade squamous dysplasia.  Given findings, and inability to repeat excisional procedure due to flushed/atrophic cervix I have recommended she has consented to proceed with exam under anesthesia, robotic hysterectomy, bilateral salpingo-oophorectomy, all other indicated procedures.    Patient is a Bahai who declines blood transfusions.  She understands risks associated with this decision including permanent endorgan damage and death.  Detailed blood refusal form discussed.  Patient will take the form home and discuss with other Mormonism members/make her decision and bring form for completion and signature on the day of surgery.  All questions answered.    Patient has reasonable exercise tolerance and no additional cardiovascular workup is indicated.  Surgical instructions and testing as per procedure pass and ERAS protocol.  Orders:  •  Case request operating room: EXAM UNDER ANESTHESIA, HYSTERECTOMY LAPAROSCOPIC TOTAL (LTH) W/ ROBOTICS, BILATERAL SALPINGOOPHORECTOMY, ALL OTHER INDICATED PROCEDURES; Standing  •  Basic metabolic panel; Future  •  CBC and Platelet; Future  •  HEMOGLOBIN A1C W/ EAG ESTIMATION; Future  •  EKG 12 lead; Future  •  Provide patient education for GYN  procedure  •  Provide patient education on NPO Guidelines    Type 2 diabetes mellitus with hyperglycemia, without long-term current use of insulin (HCC)    Lab Results   Component Value Date    HGBA1C 8.1 (H) 03/01/2025     Unfortunately, given concern for potential malignancy and patient being previously lost to follow-up we will not defer surgery while awaiting for glycemic optimization.  Have counseled patient today about dietary modifications and strict compliance with diabetes meds.  Will consult endocrinology for perioperative optimization.  Patient may need to stay in house at least overnight for insulin management/endocrine consult.       Primary hypertension  On amlodipine valsartan combination.  Will instruct patient to hold medication 24 hours prior to surgery (prior hypertensive urgency, avoid withholding for long period of time) in order to avoid MARQUITA.               History of Present Illness   Reason for Visit / CC: Preoperative visit   Tracey Howard is a 64 y.o. 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  Also with history of carcinoma cells on Pap approximately 2 years ago.  Now after being lost to follow-up presented back for evaluation and Pap demonstrated H ROBE and ECC/endometrial biopsy confirmed evidence of high-grade dysplasia.  She presents today to discuss diagnostic/therapeutic hysterectomy as she has minimal cervical portio left and is not a candidate for reexcision.  Her history and exam are unchanged from last office visit approximately 2 to 3 weeks ago.      Pertinent Medical History     Taoist with declines blood transfusions, hypertension, type 2 diabetes with endorgan damage on insulin, stage IIIb chronic kidney disease, abdominal pain, obesity         Review of Systems A complete review of systems is negative other than that noted  "above in the HPI.  Past Medical History   Past Medical History:   Diagnosis Date   • Asthma    • Chronic kidney disease    • COVID 2021    hospitalized   • Diabetes mellitus (HCC)    • Hypercholesteremia    • Hypertension    • Wears dentures    • Wears glasses      Past Surgical History:   Procedure Laterality Date   • CERVICAL BIOPSY N/A 4/21/2023    Procedure: BIOPSY LEEP CERVIX;  Surgeon: Tristen Browne MD;  Location: [facility] OR;  Service: Gynecology Oncology   • TUBAL LIGATION       Family History   Problem Relation Age of Onset   • Diabetes unspecified Mother    • Kidney disease Mother    • No Known Problems Father    • Kidney disease Sister    • Breast cancer Sister    • No Known Problems Sister    • No Known Problems Sister    • No Known Problems Sister    • No Known Problems Sister    • Heart disease Brother    • Diabetes unspecified Brother    • No Known Problems Brother       reports that she has never smoked. She has never been exposed to tobacco smoke. She has never used smokeless tobacco. She reports that she does not drink alcohol and does not use drugs.  Current Outpatient Medications   Medication Instructions   • acetaminophen (TYLENOL) 975 mg, Oral, Every 8 hours PRN   • amLODIPine-valsartan (EXFORGE)  MG per tablet 1 tablet, Oral, Daily   • Blood Glucose Monitoring Suppl (Contour Next EZ) w/Device KIT Use to test 2x daily   • Contour Next Test test strip USE TO TEST 2X DAILY   • Glucagon, rDNA, (Glucagon Emergency) 1 MG KIT Use in case of emergency for low blood sugar   • metFORMIN (GLUCOPHAGE) 1,000 mg, Oral, 2 times daily with meals   • Microlet Lancets MISC USE TO TEST 2X DAILY   No Active Allergies      Objective   /90 (BP Location: Left arm, Patient Position: Sitting, Cuff Size: Adult)   Pulse 84   Temp (!) 97.3 °F (36.3 °C)   Resp 18   Ht 5' 1.5\" (1.562 m)   Wt 84.3 kg (185 lb 12.8 oz)   LMP  (LMP Unknown)   SpO2 99%   BMI 34.54 kg/m²     Body mass index is 34.54 " kg/m².  Pain Screening:  Pain Score: 0-No pain  ECOG ECOG Performance Status: 0 - Fully active, able to carry on all pre-disease performance without restriction   Physical Exam  Vitals reviewed. Exam conducted with a chaperone present.   Constitutional:       General: She is not in acute distress.     Appearance: She is obese. She is not toxic-appearing.   Cardiovascular:      Rate and Rhythm: Normal rate and regular rhythm.   Pulmonary:      Effort: Pulmonary effort is normal. No respiratory distress.      Breath sounds: No rhonchi.   Abdominal:      General: There is no distension.      Palpations: Abdomen is soft.      Tenderness: There is no abdominal tenderness.   Genitourinary:     Comments: See pelvic exam from last office visit.  Musculoskeletal:      Right lower leg: No edema.      Left lower leg: No edema.   Neurological:      Mental Status: She is alert.          Other Study Results Review : Pathology reports reviewed.        Tristen Browne MD, JAMES, FACOG, FACS  4/22/2025  9:10 AM

## 2025-04-22 NOTE — ASSESSMENT & PLAN NOTE
I have independently obtained history from patient today.  I have reviewed results from prior and most current pathologic assessment studies including Pap smears, cervical biopsies/excisional biopsy results.    Patient had evidence of carcinoma cells on Pap with subsequent excisional procedure demonstrating no evidence of abnormalities.  Unfortunately she was lost to follow-up.  Now with Pap demonstrating HSIL and fractional biopsies of endocervix and endometrium demonstrating high-grade squamous dysplasia.  Given findings, and inability to repeat excisional procedure due to flushed/atrophic cervix I have recommended she has consented to proceed with exam under anesthesia, robotic hysterectomy, bilateral salpingo-oophorectomy, all other indicated procedures.    Patient is a Anabaptist who declines blood transfusions.  She understands risks associated with this decision including permanent endorgan damage and death.  Detailed blood refusal form discussed.  Patient will take the form home and discuss with other Presyb[employer]ian members/make her decision and bring form for completion and signature on the day of surgery.  All questions answered.    Patient has reasonable exercise tolerance and no additional cardiovascular workup is indicated.  Surgical instructions and testing as per procedure pass and ERAS protocol.  Orders:  •  Case request operating room: EXAM UNDER ANESTHESIA, HYSTERECTOMY LAPAROSCOPIC TOTAL (LTH) W/ ROBOTICS, BILATERAL SALPINGOOPHORECTOMY, ALL OTHER INDICATED PROCEDURES; Standing  •  Basic metabolic panel; Future  •  CBC and Platelet; Future  •  HEMOGLOBIN A1C W/ EAG ESTIMATION; Future  •  EKG 12 lead; Future  •  Provide patient education for GYN procedure  •  Provide patient education on NPO Guidelines

## 2025-04-23 ENCOUNTER — TELEPHONE (OUTPATIENT)
Dept: GYNECOLOGIC ONCOLOGY | Facility: CLINIC | Age: 65
End: 2025-04-23

## 2025-04-23 NOTE — TELEPHONE ENCOUNTER
Phoned patient's daughter Nery, confirmed daughter via communication information.  Offered date of surgery 5/9/2025.  Patient's daughter mentioned that they will be seeking a second opinion.  Will contact me in the future with decision to proceed with procedure with [facility] provider.  Call back number provided.  [phone].

## 2025-04-29 ENCOUNTER — OFFICE VISIT (OUTPATIENT)
Dept: ENDOCRINOLOGY | Facility: CLINIC | Age: 65
End: 2025-04-29
Attending: FAMILY MEDICINE
Payer: MEDICARE

## 2025-04-29 ENCOUNTER — CLINICAL SUPPORT (OUTPATIENT)
Dept: FAMILY MEDICINE CLINIC | Facility: CLINIC | Age: 65
End: 2025-04-29
Payer: MEDICARE

## 2025-04-29 VITALS
SYSTOLIC BLOOD PRESSURE: 158 MMHG | RESPIRATION RATE: 14 BRPM | OXYGEN SATURATION: 98 % | WEIGHT: 185.5 LBS | HEART RATE: 102 BPM | BODY MASS INDEX: 34.14 KG/M2 | HEIGHT: 62 IN | DIASTOLIC BLOOD PRESSURE: 102 MMHG

## 2025-04-29 VITALS — SYSTOLIC BLOOD PRESSURE: 122 MMHG | DIASTOLIC BLOOD PRESSURE: 78 MMHG

## 2025-04-29 DIAGNOSIS — E11.65 TYPE 2 DIABETES MELLITUS WITH HYPERGLYCEMIA, WITHOUT LONG-TERM CURRENT USE OF INSULIN (HCC): ICD-10-CM

## 2025-04-29 DIAGNOSIS — E08.319 DIABETIC RETINOPATHY ASSOCIATED WITH DIABETES MELLITUS DUE TO UNDERLYING CONDITION, MACULAR EDEMA PRESENCE UNSPECIFIED, UNSPECIFIED LATERALITY, UNSPECIFIED RETINOPATHY SEVERITY (HCC): ICD-10-CM

## 2025-04-29 DIAGNOSIS — I10 PRIMARY HYPERTENSION: Primary | ICD-10-CM

## 2025-04-29 DIAGNOSIS — E04.1 THYROID NODULE: Primary | ICD-10-CM

## 2025-04-29 PROCEDURE — G2211 COMPLEX E/M VISIT ADD ON: HCPCS

## 2025-04-29 PROCEDURE — 99214 OFFICE O/P EST MOD 30 MIN: CPT

## 2025-04-29 PROCEDURE — 99211 OFF/OP EST MAY X REQ PHY/QHP: CPT

## 2025-04-29 NOTE — PROGRESS NOTES
Name: Tracey Howard      : 1960      MRN: [id number]  Encounter Provider: FERN Reynaga  Encounter Date: 2025   Encounter department: [facility] [place]    Chief Complaint   Patient presents with    Diabetes Type 2     Assessment & Plan  1. Diabetes mellitus.  Her A1c level has improved to 8.1, but it remains elevated, suggesting potential hyperglycemia later in the day. Her average blood glucose level is approximately 186, based on her A1c, which is higher than the desired range of 120 to 130. She has been advised to monitor her blood glucose levels at various times throughout the day, including before bedtime and 2 hours postprandial, in addition to her morning readings. She will provide these readings via email within a 2-week period for further evaluation and potential medication adjustment. She is most likely experiencing post-prandial hyperglycemia and would benefit from Prandin. Laboratory tests, including A1c and a metabolic panel, have been ordered to be completed prior to her next appointment.    2. Hypertension.  Her blood pressure readings have consistently been elevated, with today's reading being particularly high at 150/90. She is scheduled to see Dr. Moe today for further management of her hypertension.    3. Thyroid nodules.  An ultrasound was previously conducted, which recommended a biopsy due to suspicious findings. A repeat ultrasound has been ordered for further evaluation. She will contact central scheduling to arrange this procedure. If the repeat ultrasound shows concerning findings, a biopsy will be recommended.    Follow-up  The patient will follow up in approximately 3 to 4 months.    History of Present Illness  Tracey Howard is a 63 y.o. female with a history of type 2 diabetes, hypertension, CKD, thyroid nodules. Complications:  proteinuria. Last A1C was 8.1. No longer taking glimepiride    Current regimen:    Metformin 1,000 mg BID     ACE/ARB: lisinopril     She monitors her blood glucose levels at home, with recent readings of 120 in the morning, 88 on another morning, and 130. She does not monitor her blood glucose levels postprandially or before bedtime. Her dietary modifications include the elimination of white rice, pasta, bread, and juice, opting for water instead. She acknowledges that her largest meal is typically consumed at dinner.    She is under the care of Dr. Mac for her hypertension, with a scheduled appointment today. She reports that her blood pressure was measured immediately upon entering the examination room, which may have contributed to the elevated reading. She believes that her blood pressure would have been in the 90s if it had been measured after a period of rest.        MEDICATIONS  Current: Metformin.  Discontinued: Glimepiride.    Thyroid nodules: Thyroid US  4/2023 revealed 2 suspicious nodules- right 2.3cm nodule and left 3.1cm nodule. . Biopsy was recommended. Unable to reach patient at that time. Confirmed phone number with patient today. Daughter reports is it for her sister who lives with her mother. They will let her know today that we will be using that phone number to reach her mother. They declined to offer an alternative phone number. She reports no difficulty swallowing, breathing, or changes in her voice. +thyromegaly on exam. Palpable nodules on exam.    Last Foot Exam: 03/25/2025    Health Maintenance   Topic Date Due    Diabetic Eye Exam  03/21/2026    Diabetic Foot Exam  03/25/2026           Review of Systems   Constitutional:  Negative for chills and fever.   HENT:  Negative for ear pain and sore throat.    Eyes:  Negative for pain and visual disturbance.   Respiratory:  Negative for cough and shortness of breath.    Cardiovascular:  Negative for chest pain and palpitations.   Gastrointestinal:  Negative for abdominal pain and vomiting.   Genitourinary:  Negative for  "dysuria and hematuria.   Musculoskeletal:  Negative for arthralgias and back pain.   Skin:  Negative for color change and rash.   Neurological:  Negative for seizures and syncope.   All other systems reviewed and are negative.   as per HPI         Medical History Reviewed by provider this encounter:     .    Objective   BP (!) 158/102 (BP Location: Left arm, Patient Position: Sitting, Cuff Size: Adult)   Pulse 102   Resp 14   Ht 5' 1.5\" (1.562 m)   Wt 84.1 kg (185 lb 8 oz)   LMP  (LMP Unknown)   SpO2 98%   BMI 34.48 kg/m²      Body mass index is 34.48 kg/m².  Wt Readings from Last 3 Encounters:   04/29/25 84.1 kg (185 lb 8 oz)   04/22/25 84.3 kg (185 lb 12.8 oz)   04/15/25 82.6 kg (182 lb)     Physical Exam  There is slight enlargement and some nodules present in the neck.    Vital Signs  Blood pressure is 150/90.  Physical Exam  Vitals reviewed.   HENT:      Head: Normocephalic and atraumatic.   Cardiovascular:      Rate and Rhythm: Tachycardia present.   Pulmonary:      Effort: Pulmonary effort is normal.   Musculoskeletal:      Cervical back: No tenderness.   Neurological:      Mental Status: She is alert.           Results  Laboratory Studies  A1c is down to 8.1. Blood sugar levels at home were 120, 88, and 130.  Labs:   Lab Results   Component Value Date    HGBA1C 8.1 (H) 03/01/2025    HGBA1C 10.7 (A) 03/21/2024    HGBA1C 6.6 (A) 09/18/2023     Lab Results   Component Value Date    CREATININE 1.30 03/01/2025    CREATININE 1.28 04/16/2024    CREATININE 1.20 04/22/2023    BUN 28 (H) 03/01/2025    K 4.8 03/01/2025     03/01/2025    CO2 26 03/01/2025     eGFR   Date Value Ref Range Status   03/01/2025 43 ml/min/1.73sq m Final     Lab Results   Component Value Date    HDL 35 (L) 03/01/2025    TRIG 214 (H) 03/01/2025     Lab Results   Component Value Date    ALT 9 03/01/2025    AST 16 03/01/2025    ALKPHOS 74 03/01/2025     Lab Results   Component Value Date    EXP6FENAYOVO 0.652 04/16/2024    " [id number] 1.686 04/22/2023       Patient Instructions   [phone] - schedule thyroid ultrasound     Discussed with the patient and all questioned fully answered. She will call me if any problems arise.

## 2025-04-30 ENCOUNTER — TELEPHONE (OUTPATIENT)
Age: 65
End: 2025-04-30

## 2025-04-30 NOTE — TELEPHONE ENCOUNTER
Patient's daughter Nery called asking if Dr Messer could order a higher dose of patient's Exforge because patient's BP has been running consistently high over the last 2 weeks.  Daughter states that she has been having reading so 170-190/.  She was unable to provide exact readings. Patient is asymptomatic.   Daughter would like a call back to advise. [phone]

## 2025-05-01 DIAGNOSIS — I16.0 HYPERTENSIVE URGENCY: ICD-10-CM

## 2025-05-01 DIAGNOSIS — I10 PRIMARY HYPERTENSION: Primary | ICD-10-CM

## 2025-05-01 RX ORDER — AMLODIPINE AND VALSARTAN 10; 320 MG/1; MG/1
1 TABLET ORAL DAILY
Qty: 30 TABLET | Refills: 5 | Status: SHIPPED | OUTPATIENT
Start: 2025-05-01

## 2025-05-01 NOTE — TELEPHONE ENCOUNTER
Patients daughter called to confirm if patients medication had been sent to the pharmacy. Patients daughter advised.

## 2025-05-07 ENCOUNTER — TELEPHONE (OUTPATIENT)
Dept: GYNECOLOGIC ONCOLOGY | Facility: CLINIC | Age: 65
End: 2025-05-07

## 2025-05-07 NOTE — TELEPHONE ENCOUNTER
Phoned patient's daughter Nery, discussed if patient is still seeking a second opinion, daughter stated patient is. I mentioned to Candyidjudah that I would call back in a few weeks to discuss decision to proceed with surgery with Dr. Browne, daughter confirmed.

## 2025-05-26 DIAGNOSIS — I16.0 HYPERTENSIVE URGENCY: ICD-10-CM

## 2025-05-26 DIAGNOSIS — I10 PRIMARY HYPERTENSION: ICD-10-CM

## 2025-05-27 ENCOUNTER — TELEPHONE (OUTPATIENT)
Dept: GYNECOLOGIC ONCOLOGY | Facility: CLINIC | Age: 65
End: 2025-05-27

## 2025-05-27 ENCOUNTER — TELEPHONE (OUTPATIENT)
Age: 65
End: 2025-05-27

## 2025-05-27 RX ORDER — AMLODIPINE AND VALSARTAN 10; 320 MG/1; MG/1
1 TABLET ORAL DAILY
Qty: 90 TABLET | Refills: 0 | Status: SHIPPED | OUTPATIENT
Start: 2025-05-27

## 2025-05-27 NOTE — TELEPHONE ENCOUNTER
Requested medication(s) are due for refill today: If no, explain: just refilled on 5/1/25 with 5 refills  **If antibiotic or given during sick visit, contact patient to discuss current symptoms.   **Confirm prescribing provider    LOV:  4/15/25  **If longer then 1 year, contact patient to schedule annual PRIOR to refilling. Once scheduled, adjust refill for 30 days, no refills.  **Update CareEverywhere to confirm not being seen elsewhere    NOV:  9/11/25    Is patient due for annual visit: No  **If future appointment, adjust to annual/follow up.  ** No appointment call to schedule annual/follow up.    Route to PCP, unless PCP no longer here, then physician they are seeing next.

## 2025-05-27 NOTE — TELEPHONE ENCOUNTER
Patient daughter Nery, on communication consent, calling to request copy of LEEP pathology from 4/21/23 and most recent Pap/HPV results from 4/1/25. She has been unable to get into [employer] to print her own copies. Attempted warm transfer to office but there was no answer and call disconnected after a couple minutes on hold.     Message to clerical team.

## 2025-05-27 NOTE — TELEPHONE ENCOUNTER
LMOM in regards to the message we received about records pertaining to labs and a previous procedure.    Pts daughter had difficulties getting them off my chart.    Explained I can help her obtain them and left a call back number for her to reach us.    Messages or call can be sent to me if needed.

## 2025-05-29 ENCOUNTER — TELEPHONE (OUTPATIENT)
Dept: GYNECOLOGIC ONCOLOGY | Facility: CLINIC | Age: 65
End: 2025-05-29

## 2025-05-29 NOTE — TELEPHONE ENCOUNTER
Contacted patient's daughter Nery (confirmed via communication consent) discussed follow up of a 2nd opinion appointment.  Daughter stated patient is scheduled for a 2nd opinion appointment next week, daughter will contact me with an update regarding desire to schedule surgery with Dr. Browne.  Call back number provided.

## 2025-06-16 ENCOUNTER — TELEPHONE (OUTPATIENT)
Dept: GYNECOLOGIC ONCOLOGY | Facility: CLINIC | Age: 65
End: 2025-06-16

## 2025-06-16 NOTE — TELEPHONE ENCOUNTER
Phoned patient' daughter Samra, left a machine message to return my call to discuss 2nd opinion status, and decision to schedule surgery with Dr. Browne.  Call back number provided.

## 2025-06-17 NOTE — TELEPHONE ENCOUNTER
Phoned patient's daughter to discuss 2nd opinion and patient's desire to have surgery with Dr. Browne.   Patient was seen for a 2nd opinion with [facility].  Call back number provided.

## 2025-06-19 ENCOUNTER — TELEPHONE (OUTPATIENT)
Dept: GYNECOLOGIC ONCOLOGY | Facility: CLINIC | Age: 65
End: 2025-06-19

## 2025-06-19 NOTE — TELEPHONE ENCOUNTER
Multiple attempts made to speak with patient's daughter regarding status of second opinion and surgical location decision.    Surgical case order canceled, discussed with FERN Roper and she concurs.

## 2025-06-25 ENCOUNTER — TELEPHONE (OUTPATIENT)
Age: 65
End: 2025-06-25

## 2025-06-25 ENCOUNTER — NURSE TRIAGE (OUTPATIENT)
Age: 65
End: 2025-06-25

## 2025-06-25 NOTE — TELEPHONE ENCOUNTER
"  REASON FOR CONVERSATION: Medication Problem    SYMPTOMS: Patient's daughter calls regarding patient's medication. Patient has been taking the amlodipine-valsatan as directed. Patient is also taking the metoprolol \"as needed\" when the pressure is elevated. Caregiver was asking if this is safe? I advised the caregiver that it is not safe to be adding in extra medication.     OTHER HEALTH INFORMATION: Patient refused to take metoprolol due to dizziness so the PCP changed the prescription in April 2025.    PROTOCOL DISPOSITION: Home Care    CARE ADVICE PROVIDED: Caregiver also was asking about the patient's CKD. I was able to inform the caregiver regarding weight loss, exercise, low salt, keeping blood glucose under control and also HTN under control    PRACTICE FOLLOW-UP: Patient will be seen on 7/7/25 for a pre-op physical.     Caregiver can be reached at [phone]  Reason for Disposition   Caller has medicine question only, adult not sick, and triager answers question    Answer Assessment - Initial Assessment Questions  1. NAME of MEDICINE: \"What medicine(s) are you calling about?\"      metoprolol  2. QUESTION: \"What is your question?\" (e.g., double dose of medicine, side effect)      Can we use this medication as needed for elevated blood pressure  3. PRESCRIBER: \"Who prescribed the medicine?\" Reason: if prescribed by specialist, call should be referred to that group.      PCP  4. SYMPTOMS: \"Do you have any symptoms?\" If Yes, ask: \"What symptoms are you having?\"  \"How bad are the symptoms (e.g., mild, moderate, severe)      Patient sometimes has elevated blood pressure  5. PREGNANCY:  \"Is there any chance that you are pregnant?\" \"When was your last menstrual period?\"      N/A    Protocols used: Medication Question Call-Adult-[place]    "

## 2025-06-25 NOTE — TELEPHONE ENCOUNTER
Pt's daughter had a medication question concerning her mom's high blood pressure, I warm transferred to Lorie on the nurse line.

## 2025-07-07 ENCOUNTER — CONSULT (OUTPATIENT)
Dept: FAMILY MEDICINE CLINIC | Facility: CLINIC | Age: 65
End: 2025-07-07
Payer: MEDICARE

## 2025-07-07 VITALS
HEIGHT: 62 IN | BODY MASS INDEX: 33.97 KG/M2 | HEART RATE: 97 BPM | SYSTOLIC BLOOD PRESSURE: 140 MMHG | RESPIRATION RATE: 16 BRPM | TEMPERATURE: 97.9 F | DIASTOLIC BLOOD PRESSURE: 87 MMHG | OXYGEN SATURATION: 99 % | WEIGHT: 184.6 LBS

## 2025-07-07 DIAGNOSIS — E08.319 DIABETIC RETINOPATHY ASSOCIATED WITH DIABETES MELLITUS DUE TO UNDERLYING CONDITION, MACULAR EDEMA PRESENCE UNSPECIFIED, UNSPECIFIED LATERALITY, UNSPECIFIED RETINOPATHY SEVERITY (HCC): ICD-10-CM

## 2025-07-07 DIAGNOSIS — E66.9 OBESITY (BMI 30-39.9): ICD-10-CM

## 2025-07-07 DIAGNOSIS — E04.2 MULTIPLE THYROID NODULES: ICD-10-CM

## 2025-07-07 DIAGNOSIS — N18.32 CHRONIC RENAL FAILURE, STAGE 3B (HCC): ICD-10-CM

## 2025-07-07 DIAGNOSIS — Z01.818 PREOP EXAMINATION: Primary | ICD-10-CM

## 2025-07-07 DIAGNOSIS — I10 PRIMARY HYPERTENSION: ICD-10-CM

## 2025-07-07 DIAGNOSIS — E11.65 TYPE 2 DIABETES MELLITUS WITH HYPERGLYCEMIA, WITHOUT LONG-TERM CURRENT USE OF INSULIN (HCC): ICD-10-CM

## 2025-07-07 PROCEDURE — 99214 OFFICE O/P EST MOD 30 MIN: CPT | Performed by: FAMILY MEDICINE

## 2025-07-07 NOTE — ASSESSMENT & PLAN NOTE
Lab Results   Component Value Date    HGBA1C 8.1 (H) 03/01/2025     Low suagr diet encouraged

## 2025-07-07 NOTE — ASSESSMENT & PLAN NOTE
Lab Results   Component Value Date    EGFR 43 03/01/2025    EGFR 44 04/16/2024    EGFR 48 04/22/2023    CREATININE 1.30 03/01/2025    CREATININE 1.28 04/16/2024    CREATININE 1.20 04/22/2023   Stay well hydrated and avoid nsaids

## 2025-07-07 NOTE — ASSESSMENT & PLAN NOTE
Low sugar diet encouraged  Lab Results   Component Value Date    HGBA1C 8.1 (H) 03/01/2025       Orders:  •  Albumin / creatinine urine ratio; Future

## 2025-07-07 NOTE — PATIENT INSTRUCTIONS
Patient is medically cleared for surgery and has a hx of HTN and diabetes and CKD stage 3 in past. Patient is medically cleared for  robotic hysterectomy, bilateral salpingo-oophorectomy, all other indicated procedures by Dr. Camacho on July 29, 2025.         Notes in chart reviewed by Gyn/oncologist:    Abnormal cervical cytology  I have independently obtained history from patient today.  I have reviewed results from prior and most current pathologic assessment studies including Pap smears, cervical biopsies/excisional biopsy results.     Patient had evidence of carcinoma cells on Pap with subsequent excisional procedure demonstrating no evidence of abnormalities.  Unfortunately she was lost to follow-up.  Now with Pap demonstrating HSIL and fractional biopsies of endocervix and endometrium demonstrating high-grade squamous dysplasia.  Given findings, and inability to repeat excisional procedure due to flushed/atrophic cervix I have recommended she has consented to proceed with exam under anesthesia, robotic hysterectomy, bilateral salpingo-oophorectomy, all other indicated procedures.     Patient is a [employer] who declines blood transfusions.  She understands risks associated with this decision including permanent endorgan damage and death.  Detailed blood refusal form discussed.  Patient will take the form home and discuss with other Confucianist members/make her decision and bring form for completion and signature on the day of surgery.  All questions answered.     Patient has reasonable exercise tolerance and no additional cardiovascular workup is indicated.  Surgical instructions and testing as per procedure pass and ERAS protocol.  Orders:    Case request operating room: EXAM UNDER ANESTHESIA, HYSTERECTOMY LAPAROSCOPIC TOTAL (LTH) W/ ROBOTICS, BILATERAL SALPINGOOPHORECTOMY, ALL OTHER INDICATED PROCEDURES; Standing    Basic metabolic panel; Future    CBC and Platelet; Future    HEMOGLOBIN A1C W/ EAG  ESTIMATION; Future    EKG 12 lead; Future    Provide patient education for GYN procedure    Provide patient education on NPO Guidelines     Type 2 diabetes mellitus with hyperglycemia, without long-term current use of insulin (HCC)           Lab Results   Component Value Date     HGBA1C 8.1 (H) 03/01/2025      Unfortunately, given concern for potential malignancy and patient being previously lost to follow-up we will not defer surgery while awaiting for glycemic optimization.  Have counseled patient today about dietary modifications and strict compliance with diabetes meds.  Will consult endocrinology for perioperative optimization.  Patient may need to stay in house at least overnight for insulin management/endocrine consult.     Primary hypertension  On amlodipine valsartan combination.  Will instruct patient to hold medication 24 hours prior to surgery (prior hypertensive urgency, avoid withholding for long period of time) in order to avoid MARQUITA.

## 2025-07-07 NOTE — PROGRESS NOTES
Pre-operative Clearance  Name: Tracey Howard      : 1960      MRN: [id number]  Encounter Provider: Gerardo Anderson DO  Encounter Date: 2025   Encounter department: [facility] PRIMARY [facility]ARE    :  Chief Complaint   Patient presents with   • Pre-op Exam     Pre op clearance - Hysterectomy, 2025, Dr. Camacho, EKG needed. Pt had preop screening EKG blood work and chest xray  on 2025     Patient Instructions   Patient is medically cleared for surgery and has a hx of HTN and diabetes and CKD stage 3 in past. Patient is medically cleared for  robotic hysterectomy, bilateral salpingo-oophorectomy, all other indicated procedures by Dr. Camacho on 2025.         Notes in chart reviewed by Gyn/oncologist:    Abnormal cervical cytology  I have independently obtained history from patient today.  I have reviewed results from prior and most current pathologic assessment studies including Pap smears, cervical biopsies/excisional biopsy results.     Patient had evidence of carcinoma cells on Pap with subsequent excisional procedure demonstrating no evidence of abnormalities.  Unfortunately she was lost to follow-up.  Now with Pap demonstrating HSIL and fractional biopsies of endocervix and endometrium demonstrating high-grade squamous dysplasia.  Given findings, and inability to repeat excisional procedure due to flushed/atrophic cervix I have recommended she has consented to proceed with exam under anesthesia, robotic hysterectomy, bilateral salpingo-oophorectomy, all other indicated procedures.     Patient is a [employer] who declines blood transfusions.  She understands risks associated with this decision including permanent endorgan damage and death.  Detailed blood refusal form discussed.  Patient will take the form home and discuss with other Mu-[employer] members/make her decision and bring form for completion and signature on the day of surgery.  All questions answered.      Patient has reasonable exercise tolerance and no additional cardiovascular workup is indicated.  Surgical instructions and testing as per procedure pass and ERAS protocol.  Orders:  •  Case request operating room: EXAM UNDER ANESTHESIA, HYSTERECTOMY LAPAROSCOPIC TOTAL (LTH) W/ ROBOTICS, BILATERAL SALPINGOOPHORECTOMY, ALL OTHER INDICATED PROCEDURES; Standing  •  Basic metabolic panel; Future  •  CBC and Platelet; Future  •  HEMOGLOBIN A1C W/ EAG ESTIMATION; Future  •  EKG 12 lead; Future  •  Provide patient education for GYN procedure  •  Provide patient education on NPO Guidelines     Type 2 diabetes mellitus with hyperglycemia, without long-term current use of insulin (HCC)           Lab Results   Component Value Date     HGBA1C 8.1 (H) 03/01/2025      Unfortunately, given concern for potential malignancy and patient being previously lost to follow-up we will not defer surgery while awaiting for glycemic optimization.  Have counseled patient today about dietary modifications and strict compliance with diabetes meds.  Will consult endocrinology for perioperative optimization.  Patient may need to stay in house at least overnight for insulin management/endocrine consult.     Primary hypertension  On amlodipine valsartan combination.  Will instruct patient to hold medication 24 hours prior to surgery (prior hypertensive urgency, avoid withholding for long period of time) in order to avoid MARQUITA.    Assessment & Plan  Preop examination  Patient is medically cleared for robotic hysterectomy.        Type 2 diabetes mellitus with hyperglycemia, without long-term current use of insulin (HCC)  Low sugar diet encouraged  Lab Results   Component Value Date    HGBA1C 8.1 (H) 03/01/2025       Orders:  •  Albumin / creatinine urine ratio; Future    Chronic renal failure, stage 3b (HCC)  Lab Results   Component Value Date    EGFR 43 03/01/2025    EGFR 44 04/16/2024    EGFR 48 04/22/2023    CREATININE 1.30 03/01/2025    CREATININE  1.28 04/16/2024    CREATININE 1.20 04/22/2023   Stay well hydrated and avoid nsaids         Primary hypertension  Stable       Diabetic retinopathy associated with diabetes mellitus due to underlying condition, macular edema presence unspecified, unspecified laterality, unspecified retinopathy severity (HCC)    Lab Results   Component Value Date    HGBA1C 8.1 (H) 03/01/2025     Low suagr diet encouraged       Multiple thyroid nodules  Sees endo and must be followed       Obesity (BMI 30-39.9)               Pre-operative Clearance:     Revised Cardiac Risk Index:  RCI RISK CLASS I (0 risk factors, risk of major cardiac complications approximately 0.5%)    Medication Instructions:   - ACE Inhibitors or ARBs: Continue this medication up to the evening before surgery/procedure, but do not take the morning of the day of surgery.  - Calcium channel blockers: Continue to take this medication on your normal schedule.      Medicine Instructions for Adults with Diabetes (NO Bowel Prep)    Follow these instructions when a BOWEL PREP is NOT required for your procedure or surgery!    NOTE:  GLP Agonists taken weekly: do not take in the 7 days before your procedure. **Bariatric surgery: do not take 4 weeks prior to your procedure.    SGLT-2 Inhibitors: do not take in the 4 days before your procedure    On the Day Before Surgery/Procedure  If you are having a procedure (e.g., Colonoscopy) or surgery which DOES NOT require a bowel prep, follow the directions below based on the type of medicine you take for your diabetes.  Type of Medicine You Take Examples What to Do   Pre-Mixed Insulin Intermediate  Wdwavgn50/25, Ezxowqo73/30, Novolog 70/30, Regular Insulin Take 1/2 your regular dose the evening before our procedure.   Rapid/Fast Acting  Insulin and/or Long-Acting Insulin Humalog U200, NovoLog, Apidra,  Lantus, Levemir, Tresiba, Toujeo,  Fias, Basaglar Take your FULL regular dose the day before procedure.   Oral Diabetic Medicines  (sulfonylurea) Glipizide/Glimepiride/  Glucotrol Take your regular dose with dinner the evening before your procedure.   Other Oral Diabetic Medicines Metformin, Glucophage, Glucophage  XR, Riomet, Glumetza, Actose,  Avandia, Gl set, Prandin Take your regular dose with dinner the evening before your procedure   GLP Agonists Adlyxin, Byetta, Bydureon,  Ozempic, Soliqua, Tanzeum,  Trulicity, Victoza, Saxenda,  Rybelsus, Wegovy, Mounjaro, Zepbound If taken daily, take as normal  If taken weekly, do not take this medicine for 7 days before your procedure including the day of the procedure (resume taking after the procedure). **Bariatric surgery: do not take 4 weeks prior to procedure   SGLT-2 Inhibitors Jardiance, Invokana, Farxiga, Steglatro, Brenzavvy, Qtern, Segluromet Glyxambi, Synjardy, Synjardy XR, Invokamet, InvokametXR, Trijary XR, Xigduo X Do not take for 4 days before your procedure including the day of the procedure (resume taking after the procedure)   This educational material has been approved by the Patient Education Advisory Committee.    On the Day of Surgery/Procedure  Follow the directions below based on the type of medicine you take for your diabetes.  Type of Medicine You Take  Examples What to Do   Long-Acting Insulin Lantus, Levemir, Tresiba,  Toujeo, Basaglar, Semglee If you normally take your Long Acting Insulin in the morning, take the full dose as scheduled.   GLP-I Agonists Adlyxin, Byetta, Bydureon,  Ozempic, Soliqua, Tanzeum,  Trulicity, Victoza, Saxenda,  Rybelsus, Mounjaro Do NOT take this medicine on the day of your procedure (resume taking after the procedure)   Except for the morning Long-Acting Insulin, DO NOT take ANY diabetic medicine on the day of your procedure unless you were instructed by the doctor who manages your diabetes medicines.  Continue to check your blood sugars.  If you have an insulin pump, ask your endocrinologist for instructions at least 3 days before your  procedure. NOTE: If you are not able to ask your endocrinologist in advance, on the day of the procedure set your insulin pump to your basal rate only. Bring your insulin pump supplies to the hospital.       History of Present Illness     Pre-Op Examination     Surgery: hysterectomy   Anticipated Date of Surgery: July 29, 2025   Surgeon: Dr. Camacho     Having a hysterectomy on July 29, 2025, [facility] testing.         Notes reviewed in chart by Gyn/Onc:    :  Reason for Visit / CC: Preoperative visit   Tracey Howard is a 64 y.o. female   64-year-old [employer] with uncontrolled diabetes with hemoglobin A1c greater than 8%, hypertension, stage IIIb chronic kidney disease, chronic abdominal pain and obesity.  Also with history of carcinoma cells on Pap approximately 2 years ago.  Now after being lost to follow-up presented back for evaluation and Pap demonstrated H ROBE and ECC/endometrial biopsy confirmed evidence of high-grade dysplasia.  She presents today to discuss diagnostic/therapeutic hysterectomy as she has minimal cervical portio left and is not a candidate for reexcision.  Her history and exam are unchanged from last office visit approximately 2 to 3 weeks ago. PAT labs and EKG done recently as well as cxray stable.         Pertinent Medical History  Jainism with declines blood transfusions, hypertension, type 2 diabetes with endorgan damage on insulin, stage IIIb chronic kidney disease, abdominal pain, obesity               Previous history of bleeding disorders or clots?: No    Previous Anesthesia reaction?: No    Prolonged steroid use in the last 6 months?: No      Assessment of Cardiac Risk:   - Unstable or severe angina or MI in the last 6 weeks or history of stent placement in the last year?: No    - Decompensated heart failure (e.g. New onset heart failure, NYHA  Class IV heart failure, or worsening existing  "heart failure)?: No    - Significant arrhythmias such as high grade AV block, symptomatic ventricular arrhythmia, newly recognized ventricular tachycardia, supraventricular tachycardia with resting heart rate >100, or symptomatic bradycardia?: No    - Severe heart valve disease including aortic stenosis or symptomatic mitral stenosis?: No      Pre-operative Risk Factors:  - Elevated-risk surgery: No    - History of cerebrovascular disease: No    - History of ischemic heart disease: No    - History of congestive heart failure: No    - Pre-operative treatment with insulin: No    - Pre-operative creatinine >2 mg/dL: No      Review of Systems   Constitutional: Negative.    HENT: Negative.     Eyes: Negative.    Respiratory: Negative.     Cardiovascular: Negative.    Gastrointestinal: Negative.    Endocrine: Negative.    Genitourinary: Negative.    Musculoskeletal: Negative.    Skin: Negative.    Allergic/Immunologic: Negative.    Neurological: Negative.    Hematological: Negative.    Psychiatric/Behavioral: Negative.       Past Medical History   Past Medical History[1]  Past Surgical History[2]  Family History[3]  Social History[4]  Medications[5]  No Known Allergies  Objective   /87   Pulse 97   Temp 97.9 °F (36.6 °C) (Temporal)   Resp 16   Ht 5' 1.5\" (1.562 m)   Wt 83.7 kg (184 lb 9.6 oz)   LMP  (LMP Unknown)   SpO2 99%   BMI 34.32 kg/m²     Physical Exam  Constitutional:       Appearance: She is well-developed. She is obese.   HENT:      Head: Normocephalic and atraumatic.      Right Ear: External ear normal.      Left Ear: External ear normal.      Nose: Nose normal.      Mouth/Throat:      Mouth: Mucous membranes are moist.     Eyes:      Conjunctiva/sclera: Conjunctivae normal.      Pupils: Pupils are equal, round, and reactive to light.       Cardiovascular:      Rate and Rhythm: Normal rate and regular rhythm.      Pulses: Normal pulses.      Heart sounds: Normal heart sounds.   Pulmonary:      " Effort: Pulmonary effort is normal.      Breath sounds: Normal breath sounds.   Abdominal:      General: Abdomen is flat.      Palpations: Abdomen is soft.     Musculoskeletal:         General: Normal range of motion.      Cervical back: Normal range of motion and neck supple.     Skin:     General: Skin is warm and dry.      Capillary Refill: Capillary refill takes less than 2 seconds.     Neurological:      General: No focal deficit present.      Mental Status: She is alert and oriented to person, place, and time. Mental status is at baseline.      Deep Tendon Reflexes: Reflexes are normal and symmetric.     Psychiatric:         Mood and Affect: Mood normal.         Behavior: Behavior normal.         Thought Content: Thought content normal.         Judgment: Judgment normal.     Administrative Statements   I have spent a total time of 42 minutes in caring for this patient on the day of the visit/encounter including Diagnostic results, Prognosis, Risks and benefits of tx options, Instructions for management, Patient and family education, Importance of tx compliance, Risk factor reductions, Impressions, Counseling / Coordination of care, Documenting in the medical record, Reviewing/placing orders in the medical record (including tests, medications, and/or procedures), and Obtaining or reviewing history  .    Gerardo Anderson DO         [1]  Past Medical History:  Diagnosis Date   • Asthma    • Chronic kidney disease    • COVID 2021    hospitalized   • Diabetes mellitus (HCC)    • Hypercholesteremia    • Hypertension    • Wears dentures    • Wears glasses    [2]  Past Surgical History:  Procedure Laterality Date   • CERVICAL BIOPSY N/A 4/21/2023    Procedure: BIOPSY LEEP CERVIX;  Surgeon: Tristen Browne MD;  Location: [facility] OR;  Service: Gynecology Oncology   • FL LUMBAR PUNCTURE DIAGNOSTIC  3/10/2024   • TUBAL LIGATION     [3]  Family History  Problem Relation Name Age of Onset   • Diabetes unspecified Mother      • Kidney disease Mother     • No Known Problems Father     • Kidney disease Sister     • Breast cancer Sister     • No Known Problems Sister     • No Known Problems Sister     • No Known Problems Sister     • No Known Problems Sister     • Heart disease Brother     • Diabetes unspecified Brother     • No Known Problems Brother     [4]  Social History  Tobacco Use   • Smoking status: Never     Passive exposure: Never   • Smokeless tobacco: Never   Vaping Use   • Vaping status: Never Used   Substance and Sexual Activity   • Alcohol use: Never   • Drug use: Never   • Sexual activity: Not Currently   [5]  Current Outpatient Medications on File Prior to Visit   Medication Sig   • acetaminophen (TYLENOL) 325 mg tablet Take 3 tablets (975 mg total) by mouth every 8 (eight) hours as needed for mild pain   • amLODIPine-valsartan (EXFORGE)  MG per tablet TAKE 1 TABLET BY MOUTH EVERY DAY   • Blood Glucose Monitoring Suppl (Contour Next EZ) w/Device KIT Use to test 2x daily   • Contour Next Test test strip USE TO TEST 2X DAILY   • Glucagon, rDNA, (Glucagon Emergency) 1 MG KIT Use in case of emergency for low blood sugar   • metFORMIN (GLUCOPHAGE) 1000 MG tablet Take 1 tablet (1,000 mg total) by mouth 2 (two) times a day with meals   • Microlet Lancets MISC USE TO TEST 2X DAILY

## 2025-07-11 ENCOUNTER — TELEPHONE (OUTPATIENT)
Dept: GYNECOLOGIC ONCOLOGY | Facility: CLINIC | Age: 65
End: 2025-07-11

## 2025-07-11 NOTE — TELEPHONE ENCOUNTER
I left a message informing the patient that the appointment with Dr. Browne on 10/7/2025 at 3:45PM was rescheduled to 9/30/2025 at 3:45PM in [place] due to the provider being out of the office.     I provided the office number for the patient to call if she needs to reschedule to a different date and/or time.

## 2025-07-15 ENCOUNTER — TELEPHONE (OUTPATIENT)
Dept: FAMILY MEDICINE CLINIC | Facility: CLINIC | Age: 65
End: 2025-07-15

## 2025-07-23 ENCOUNTER — TELEPHONE (OUTPATIENT)
Age: 65
End: 2025-07-23

## 2025-07-23 NOTE — TELEPHONE ENCOUNTER
Spoke with jessica who advised that they were looking for the patients pre-op clearance for surgery. Advised that we didn't have any forms for the patients clearance but office note stated that the patient was clear. Printed office note and faxed to Jessica.

## 2025-07-23 NOTE — TELEPHONE ENCOUNTER
Jessica from [facility] was calling about the form that was faxed. Jessica was warm transferred to [place] at [facility].

## 2025-07-28 ENCOUNTER — TELEPHONE (OUTPATIENT)
Age: 65
End: 2025-07-28

## 2025-08-04 DIAGNOSIS — I10 PRIMARY HYPERTENSION: ICD-10-CM

## 2025-08-04 DIAGNOSIS — I16.0 HYPERTENSIVE URGENCY: ICD-10-CM

## 2025-08-04 RX ORDER — AMLODIPINE AND VALSARTAN 10; 320 MG/1; MG/1
1 TABLET ORAL DAILY
Qty: 90 TABLET | Refills: 1 | Status: SHIPPED | OUTPATIENT
Start: 2025-08-04

## (undated) DEVICE — SUT SILK 2-0 SH 30 IN K833H

## (undated) DEVICE — TUBING SUCTION 5MM X 12 FT

## (undated) DEVICE — LLETZ LOOP 15 X 12MM MEGADYNE

## (undated) DEVICE — BETHLEHEM UNIVERSAL MINOR VAG: Brand: CARDINAL HEALTH

## (undated) DEVICE — LLETZ LOOP 10 X 10MM MEGADYNE

## (undated) DEVICE — NEEDLE SPINAL 22G X 3.5IN  QUINCKE

## (undated) DEVICE — CAUTERY TIP POLISHER: Brand: DEVON

## (undated) DEVICE — TONGUE DEPRESSOR STERILE

## (undated) DEVICE — BLADE BEAVER CERVICAL BIOPSY

## (undated) DEVICE — SUT VICRYL 2-0 CT-1 36 IN J945H

## (undated) DEVICE — SCD SEQUENTIAL COMPRESSION COMFORT SLEEVE MEDIUM KNEE LENGTH: Brand: KENDALL SCD

## (undated) DEVICE — ELECTRODE BALL E-Z CLEAN 5 IN -0009

## (undated) DEVICE — PENCIL ELECTROSURG E-Z CLEAN -0035H

## (undated) DEVICE — HEMOSTATIC MATRIX SURGIFLO 8ML W/THROMBIN

## (undated) DEVICE — SYRINGE 10ML LL CONTROL TOP

## (undated) DEVICE — GLOVE INDICATOR PI UNDERGLOVE SZ 9 BLUE

## (undated) DEVICE — BULB SYRINGE,IRRIGATION WITH PROTECTIVE CAP: Brand: DOVER

## (undated) DEVICE — DRAPE EQUIPMENT RF WAND

## (undated) DEVICE — GLOVE PI ULTRA TOUCH SZ.8.5